# Patient Record
Sex: FEMALE | Race: WHITE | NOT HISPANIC OR LATINO | Employment: OTHER | URBAN - METROPOLITAN AREA
[De-identification: names, ages, dates, MRNs, and addresses within clinical notes are randomized per-mention and may not be internally consistent; named-entity substitution may affect disease eponyms.]

---

## 2017-01-12 LAB
CHOLESTEROL (EXTERNAL): 186 MG/DL (ref 100–199)
GLUCOSE (EXTERNAL): 91 MG/DL (ref 65–100)
HDLC SERPL-MCNC: 56 MG/DL
LDL CHOLESTEROL CALCULATED (EXTERNAL): 117 MG/DL
NON HDL CHOLESTEROL (EXTERNAL): 130 MG/DL
TRIGLYCERIDES (EXTERNAL): 64 MG/DL

## 2017-03-16 ENCOUNTER — HOSPITAL ENCOUNTER (INPATIENT)
Facility: CLINIC | Age: 57
LOS: 2 days | Discharge: HOME | DRG: 387 | End: 2017-03-19
Attending: EMERGENCY MEDICINE | Admitting: HOSPITALIST
Payer: COMMERCIAL

## 2017-03-16 DIAGNOSIS — K50.90: Primary | ICD-10-CM

## 2017-03-16 DIAGNOSIS — K52.9 COLITIS: ICD-10-CM

## 2017-03-16 LAB
ALBUMIN SERPL-MCNC: 4.4 G/DL (ref 3.4–5)
ALP SERPL-CCNC: 104 U/L (ref 40–150)
ALT SERPL W P-5'-P-CCNC: 22 U/L (ref 0–50)
ANION GAP SERPL CALCULATED.3IONS-SCNC: 11 MMOL/L (ref 3–14)
AST SERPL W P-5'-P-CCNC: 28 U/L (ref 0–45)
BASOPHILS # BLD AUTO: 0 10E9/L (ref 0–0.2)
BASOPHILS NFR BLD AUTO: 0.2 %
BILIRUB SERPL-MCNC: 0.5 MG/DL (ref 0.2–1.3)
BUN SERPL-MCNC: 13 MG/DL (ref 7–30)
CALCIUM SERPL-MCNC: 9.6 MG/DL (ref 8.5–10.1)
CHLORIDE SERPL-SCNC: 102 MMOL/L (ref 94–109)
CO2 SERPL-SCNC: 27 MMOL/L (ref 20–32)
CREAT SERPL-MCNC: 0.67 MG/DL (ref 0.52–1.04)
DIFFERENTIAL METHOD BLD: ABNORMAL
EOSINOPHIL # BLD AUTO: 0 10E9/L (ref 0–0.7)
EOSINOPHIL NFR BLD AUTO: 0.2 %
ERYTHROCYTE [DISTWIDTH] IN BLOOD BY AUTOMATED COUNT: 12.1 % (ref 10–15)
GFR SERPL CREATININE-BSD FRML MDRD: ABNORMAL ML/MIN/1.7M2
GLUCOSE SERPL-MCNC: 133 MG/DL (ref 70–99)
HCT VFR BLD AUTO: 40 % (ref 35–47)
HGB BLD-MCNC: 13.4 G/DL (ref 11.7–15.7)
IMM GRANULOCYTES # BLD: 0 10E9/L (ref 0–0.4)
IMM GRANULOCYTES NFR BLD: 0.2 %
LYMPHOCYTES # BLD AUTO: 1.1 10E9/L (ref 0.8–5.3)
LYMPHOCYTES NFR BLD AUTO: 8.9 %
MCH RBC QN AUTO: 31.2 PG (ref 26.5–33)
MCHC RBC AUTO-ENTMCNC: 33.5 G/DL (ref 31.5–36.5)
MCV RBC AUTO: 93 FL (ref 78–100)
MONOCYTES # BLD AUTO: 0.5 10E9/L (ref 0–1.3)
MONOCYTES NFR BLD AUTO: 4 %
NEUTROPHILS # BLD AUTO: 11.1 10E9/L (ref 1.6–8.3)
NEUTROPHILS NFR BLD AUTO: 86.5 %
NRBC # BLD AUTO: 0 10*3/UL
NRBC BLD AUTO-RTO: 0 /100
PLATELET # BLD AUTO: 257 10E9/L (ref 150–450)
POTASSIUM SERPL-SCNC: 3.7 MMOL/L (ref 3.4–5.3)
PROT SERPL-MCNC: 7.6 G/DL (ref 6.8–8.8)
RBC # BLD AUTO: 4.3 10E12/L (ref 3.8–5.2)
SODIUM SERPL-SCNC: 140 MMOL/L (ref 133–144)
WBC # BLD AUTO: 12.9 10E9/L (ref 4–11)

## 2017-03-16 PROCEDURE — 96361 HYDRATE IV INFUSION ADD-ON: CPT

## 2017-03-16 PROCEDURE — 25000125 ZZHC RX 250: Performed by: EMERGENCY MEDICINE

## 2017-03-16 PROCEDURE — 83690 ASSAY OF LIPASE: CPT | Performed by: EMERGENCY MEDICINE

## 2017-03-16 PROCEDURE — 85025 COMPLETE CBC W/AUTO DIFF WBC: CPT | Performed by: EMERGENCY MEDICINE

## 2017-03-16 PROCEDURE — 96374 THER/PROPH/DIAG INJ IV PUSH: CPT

## 2017-03-16 PROCEDURE — 80053 COMPREHEN METABOLIC PANEL: CPT | Performed by: EMERGENCY MEDICINE

## 2017-03-16 PROCEDURE — 99285 EMERGENCY DEPT VISIT HI MDM: CPT | Mod: 25

## 2017-03-16 PROCEDURE — 96375 TX/PRO/DX INJ NEW DRUG ADDON: CPT

## 2017-03-16 PROCEDURE — 25000128 H RX IP 250 OP 636: Performed by: EMERGENCY MEDICINE

## 2017-03-16 PROCEDURE — 84703 CHORIONIC GONADOTROPIN ASSAY: CPT | Performed by: EMERGENCY MEDICINE

## 2017-03-16 PROCEDURE — 96376 TX/PRO/DX INJ SAME DRUG ADON: CPT

## 2017-03-16 RX ORDER — FENTANYL CITRATE 50 UG/ML
25 INJECTION, SOLUTION INTRAMUSCULAR; INTRAVENOUS ONCE
Status: COMPLETED | OUTPATIENT
Start: 2017-03-16 | End: 2017-03-16

## 2017-03-16 RX ORDER — ONDANSETRON 2 MG/ML
4 INJECTION INTRAMUSCULAR; INTRAVENOUS ONCE
Status: COMPLETED | OUTPATIENT
Start: 2017-03-16 | End: 2017-03-16

## 2017-03-16 RX ORDER — METOCLOPRAMIDE HYDROCHLORIDE 5 MG/ML
10 INJECTION INTRAMUSCULAR; INTRAVENOUS ONCE
Status: COMPLETED | OUTPATIENT
Start: 2017-03-16 | End: 2017-03-16

## 2017-03-16 RX ORDER — FENTANYL CITRATE 50 UG/ML
INJECTION, SOLUTION INTRAMUSCULAR; INTRAVENOUS
Status: DISCONTINUED
Start: 2017-03-16 | End: 2017-03-17 | Stop reason: HOSPADM

## 2017-03-16 RX ADMIN — FENTANYL CITRATE 25 MCG: 50 INJECTION, SOLUTION INTRAMUSCULAR; INTRAVENOUS at 23:54

## 2017-03-16 RX ADMIN — METOCLOPRAMIDE 10 MG: 5 INJECTION, SOLUTION INTRAMUSCULAR; INTRAVENOUS at 23:56

## 2017-03-16 RX ADMIN — ONDANSETRON HYDROCHLORIDE 4 MG: 2 SOLUTION INTRAMUSCULAR; INTRAVENOUS at 23:34

## 2017-03-16 RX ADMIN — SODIUM CHLORIDE 1000 ML: 9 INJECTION, SOLUTION INTRAVENOUS at 23:31

## 2017-03-16 ASSESSMENT — ENCOUNTER SYMPTOMS
VOMITING: 1
ABDOMINAL PAIN: 1
NAUSEA: 1
DIARRHEA: 1

## 2017-03-16 NOTE — IP AVS SNAPSHOT
MRN:[id number]                      After Visit Summary   3/16/2017    Carina Chaney    MRN: [id number]           Thank you!     Thank you for choosing [facility] for your care. Our goal is always to provide you with excellent care. Hearing back from our patients is one way we can continue to improve our services. Please take a few minutes to complete the written survey that you may receive in the mail after you visit with us. Thank you!        Patient Information     Date Of Birth          1960        About your hospital stay     You were admitted on:  March 17, 2017 You last received care in the:  [place] [address] [facility]    You were discharged on:  March 19, 2017       Who to Call     For medical emergencies, please call 911.  For non-urgent questions about your medical care, please call your primary care provider or clinic, None          Attending Provider     Provider Specialty    Samantha Leigh MD Emergency Medicine    [facility], Roc YI MD Internal Medicine       Primary Care Provider Fax #    Kayla Gonzalez [phone]       No address on file        After Care Instructions     Activity       Your activity upon discharge: activity as tolerated            Diet       Follow this diet upon discharge: Orders Placed This Encounter      Regular Diet Adult                  Follow-up Appointments     Follow-up and recommended labs and tests        Follow up with primary care provider, Kayla Gonzalez, within 7 days for hospital follow- up.  No follow up labs or test are needed.  Follow up with GI in 1 week.                  Pending Results     No orders found from 3/14/2017 to 3/17/2017.            Statement of Approval     Ordered          03/19/17 1225  I have reviewed and agree with all the recommendations and orders detailed in this document.  EFFECTIVE NOW     Approved and electronically signed by:  Walt Underwood MD             Admission Information     Date &  "Time Provider Department Dept. Phone    3/16/2017 Roc Esrtada MD [place] [address] [facility] [phone]      Your Vitals Were     Blood Pressure Pulse Temperature Respirations Height Weight    137/82 (BP Location: Right arm) 55 98.1  F (36.7  C) (Oral) 16 1.676 m (5' 6\") 63.5 kg (140 lb)    Pulse Oximetry BMI (Body Mass Index)                96% 22.6 kg/m2          "[employer] Information     [employer] lets you send messages to your doctor, view your test results, renew your prescriptions, schedule appointments and more. To sign up, go to [email or web address] . Click on \"Log in\" on the left side of the screen, which will take you to the Welcome page. Then click on \"Sign up Now\" on the right side of the page.     You will be asked to enter the access code listed below, as well as some personal information. Please follow the directions to create your username and password.     Your access code is: [email or web address]  Expires: 2017  1:07 PM     Your access code will  in 90 days. If you need help or a new code, please call your [facility] clinic or [phone].        Care EveryWhere ID     This is your Care EveryWhere ID. This could be used by other organizations to access your [facility] medical records  [id number]           Review of your medicines      START taking        Dose / Directions    budesonide 3 MG EC capsule   Commonly known as:  ENTOCORT EC        Dose:  9 mg   Take 3 capsules (9 mg) by mouth every morning   Quantity:  90 capsule   Refills:  0         CONTINUE these medicines which have NOT CHANGED        Dose / Directions    diltiazem 2% in PLO cream (FV COMPOUNDED) 2% Gel        Apply topically daily as needed   Refills:  0       hyoscyamine 0.125 MG sublingual tablet   Commonly known as:  LEVSIN/SL        Dose:  0.125 mg   Place 0.125 mg under the tongue every 4 hours as needed for cramping   Refills:  0       PEPPERMINT OIL PO        Dose:  2 capsule   Take 2 capsules by mouth " 2 times daily (before meals)   Refills:  0       PROBIOTIC PO        Dose:  1 capsule   Take 1 capsule by mouth daily   Refills:  0       REGLAN PO        Dose:  10 mg   Take 10 mg by mouth every 6 hours as needed   Refills:  0       ZOFRAN PO        Dose:  4 mg   Take 4 mg by mouth every 4 hours as needed for nausea or vomiting   Refills:  0            Where to get your medicines      These medications were sent to [facility] [place], [place] - [address]  [address], [place] [address]     Phone:  [phone]     budesonide 3 MG EC capsule                Protect others around you: Learn how to safely use, store and throw away your medicines at www.disposemymeds.org.             Medication List: This is a list of all your medications and when to take them. Check marks below indicate your daily home schedule. Keep this list as a reference.      Medications           Morning Afternoon Evening Bedtime As Needed    budesonide 3 MG EC capsule   Commonly known as:  ENTOCORT EC   Take 3 capsules (9 mg) by mouth every morning   Next Dose Due:  Resume home schedule.                                   diltiazem 2% in PLO cream (FV COMPOUNDED) 2% Gel   Apply topically daily as needed   Next Dose Due:  Resume home schedule.                                   hyoscyamine 0.125 MG sublingual tablet   Commonly known as:  LEVSIN/SL   Place 0.125 mg under the tongue every 4 hours as needed for cramping   Next Dose Due:  resume home schedule.                                   PEPPERMINT OIL PO   Take 2 capsules by mouth 2 times daily (before meals)   Next Dose Due:  Resume home schedule.                                      PROBIOTIC PO   Take 1 capsule by mouth daily   Next Dose Due:  Resume home schedule.                                   REGLAN PO   Take 10 mg by mouth every 6 hours as needed   Next Dose Due:  Resume home schedule.                                   ZOFRAN PO   Take 4 mg by mouth  "every 4 hours as needed for nausea or vomiting   Next Dose Due:  Resume home schedule.                                             More Information        Discharge Instructions for Crohn s Disease  You have been diagnosed with Crohn s disease. Your digestive tract is swollen and inflamed. All layers of your digestive tract may be affected. Although there is no cure for Crohn s disease, you can receive treatment for the symptoms. Help manage your symptoms by following your doctor s advice and watching what you eat.  Home Care    Work closely with your doctor to determine the types of treatment that are best for you.    Take your medications exactly as directed:    Let your doctor know if you are having uncomfortable side effects.    Don t stop taking your medications without talking to your doctor first.    It may be helpful to avoid certain foods for a little while. Depending on your condition, these may include caffeine (coffee, tea, and cola), spicy foods, milk products, and raw fruits and vegetables. For certain people, these can be hard to digest and can worsen symptoms in a flare-up. Your doctor may have you work with a nutritionist to come up with the best food choices for you.    Try eating several small meals a day instead of 3 large ones.    Keep appointments for regular checkups even if you are not having symptoms.    Talk to your doctor about surgery for Crohn s disease. Surgery won t cure Crohn s disease, but it may help control the symptoms. Only you and your doctor can decide if this option is right for you.    Learn more about your condition. Contact the Crohn s and Colitis Foundation toll-free at [phone] (www.ccfa.org)  Managing Nutrition  You may be able to eat most foods until you have a flare-up. But like anyone else, you need to make healthy eating choices. Some of the healthiest foods can make symptoms worse, though. Keeping track of your \"problem foods\" may be helpful. Ask your doctor any " questions you have about healthy eating.  When to Call Your Doctor  Call your doctor right away if you have any of the following:    Severe pain or bloating in your abdomen after meals    Sores in your mouth    Sores in your anal area (around your rectum)    Fever above 101 F (38.3 C) or chills    Poor appetite or weight loss    Bloody diarrhea    Nausea or vomiting    Skin rashes or skin that weeps (or drains)    Changes in your vision     4665-1624 [employer]. [address] [place] [address], [place] [address]. All rights reserved. This information is not intended as a substitute for professional medical care. Always follow your healthcare professional's instructions.                Lifestyle Management of Crohn s Disease  You can lead a full life even if you have Crohn s disease. Focus on keeping your symptoms under control. Do not let this disease isolate you. By planning ahead and working with support groups, you can find ways to cope, and you may even help others who have Crohn s disease.  Crohn s disease is a type of inflammatory bowel disease (IBD).   Have a Plan  Make this your goal:  Crohn s disease won t keep me from the activities I enjoy.  You may need to do some planning to reach that goal. But by staying positive, you can help make sure you re in control -- not the disease. Here are some other tips:    Know where to find clean bathrooms.    Eat more small meals instead of 3 big meals, especially when on the road or when you don t have easy access to bathrooms.    If you ve had a recent flare-up, eat foods that you know will limit your symptoms. Keep those foods on hand, both at home and at work.    Get some exercise every day.    Take a stress reduction class.    If going on a long trip, discuss your plans with your health care provider. He or she can teach you what to do if you have a flare-up while on the road.    Find a Support Group  Crohn s disease support groups can help you with many  concerns you may have. Other people have felt much of what you may be feeling. Just knowing that you re not alone can be a great comfort. Someone in a support group may offer a travel tip or a coping skill that s perfect for you, and don t forget how satisfying it can feel to help another Crohn s disease patient who s in need.  Contact the Crohn s and Colitis Foundation toll-free at [phone] for more information.  Managing Nutrition  You may be able to eat most foods until you have a flare-up. But like anyone else, you need to make healthy eating choices. Some of the healthiest foods can make symptoms worse, though. Keeping track of your  problem foods  may be helpful. Ask your health care provider any questions you have about healthy eating.  Avoid Your Problem Foods  There s no rule for which foods can be a problem. How you feel after eating them is the best guide. You may need to avoid high-fiber foods and foods that are hard to digest. These can include fresh fruits and vegetables. High-fat foods, such as whole-milk dairy products and red meat, also can worsen symptoms in a flare-up. Write down what you eat and how it affects you. If one kind of food often gives you trouble, stay away from it. Also note the foods that work well for you. Your health care provider may have you see a registered dietitian to come up with the best food choices for you. A registered dietitian can help ensure that you eat foods that are  safe  while getting proper nourishment.  Foods That Are Often  Safe   No two people respond the same to all foods. But these choices are often  safe  to eat during a flare-up:    Tuna packed in water    Skinless chicken    White rice    Mashed potatoes    Plain pasta    Instant oatmeal    Lexus toast   Applesauce    Flavored gelatin    Vanilla pudding    Custard    Baked potatoes (don t eat the skin)    Canned peaches or pears   If You Need Special Nourishment  In rare cases, the small intestine  can t absorb nutrients. Total parenteral nutrition (TPN) is a treatment that provides nourishment through an IV (intravenous) tube. This lets you get nutrition without eating, giving your digestive tract time to rest. TPN also may be used to help prepare for surgery, if needed. TPN can be done either in the hospital or at home with the aid of a home health nurse.     4288-6350 [employer]. [address] [place] [address], [place] [address]. All rights reserved. This information is not intended as a substitute for professional medical care. Always follow your healthcare professional's instructions.                Management of Crohn s Disease: Medications  Your doctor may prescribe medication to control your Crohn s disease symptoms and improve your quality of life. Medication won t cure Crohn s disease, but it can help keep the disease from slowing you down. As always, work closely with your doctor. Your medication or dosage may need to be changed if you have certain side effects or if your symptoms change  Corticosteroids  Your doctor may advise you to take corticosteroids. These help to calm inflammation in your body. This can make your symptoms better quickly. You may take corticosteroids as a pill, or liquid by mouth. In some cases, they may be given through an IV or given rectally as either a suppository or an enema. You take them for a short time, usually not longer than 8 to 12 weeks. You do not take them when you are in remission. Remission is a long period with no symptoms.  If used for a long time, side effects may include:    Mood changes    Trouble sleeping    Changes in body shape    Puffy face or acne    Weight gain    Stretch marks    Eye problems       Bone loss or breaks    Facial hair in women    High blood pressure    Risk of diabetes  Immunomodulators  These kinds of medications cause your body s immune system to be less active. This can help to reduce inflammation and calm your symptoms.  They are taken as a pill, by mouth. You may not feel their effects until you have taken them for a few months. But you can take them for a long time. You will need to have blood tests every few months to check your liver and blood cell counts.  Side effects may include:    Nausea    Body aches    Inflammation of the pancreas    Low white blood cell count    Liver problems    Low folic acid levels    Infection    Lymphoma    Non-melanoma skin cancer  Biologic agents  These kinds of medications help to stop a chemical that your body makes, which causes inflammation. The chemical is called tumor necrosis factor (TNF). The medications are also known as anti-TNF monoclonal antibodies. The way you take a biologic agent depends on how the drug is given. It may be given through an IV every 2 to 8 weeks. It may be given through an injection (shot) as often as once a week. Or it may be given as a shot once a month. These drugs can put you at risk for infections, so tell your doctor if you have a chronic infection. Your doctor may also test you for tuberculosis and hepatitis B infection before giving you the medication.  Side effects may include:    Flushing, chest pain, shortness of breath, hives, or a drop in blood pressure during IV treatment    Joint and muscle aches    Rash    Fever    Infection (including reactivation of the tuberculosis and hepatitis B)    Lymphoma    Skin cancers    Hepatotoxity    TNF-induced psoriasis  Antibiotics  These may be used if you also have an infection due to Crohn s disease, such as an abscess. Antibiotics may be given as a pill taken by mouth. You should stay out of the sun while taking them. You should also not drink alcohol while taking them. It may cause severe reactions, such as nausea, vomiting, and breathing problems. Also, tell your doctor right away if you have numbness or tingling in your hands. Also tell your doctor if your bowel symptoms become worse.  Side effects may  include:    Nausea    Diarrhea    Headaches    Dizziness    Dark urine    Loss of appetite    Metallic taste in the mouth    Sensitivity to the sun  Handling side effects  You and your doctor will discuss side effects. In most cases, side effects are easy to manage. But sometimes they can become severe enough that you need to change medication. Call your doctor if you re having side effects that trouble you. Also call if you re having any side effects that are unexpected.    1215-0345 [employer]. [address] [place] [address], [place] [address]. All rights reserved. This information is not intended as a substitute for professional medical care. Always follow your healthcare professional's instructions.                What Is Crohn s Disease?     Any part of the digestive tract, from the mouth to the anus, can be affected by Crohn's disease. It is mostly found where the small intestine and colon meet.   Crohn s disease causes swelling, inflammation, and irritation, which leads to ulceration of the digestive tract. It is a form of inflammatory bowel disease (IBD) and often affects the small intestine and the colon. All layers of the lining of the digestive tract may be affected. While this disease has no cure, the symptoms can be treated. Help manage your symptoms by following your doctor s advice and avoiding foods that cause irritation.  Symptoms of Crohn s disease    Abdominal pain and cramping    Urgent need to move bowels or sensation of incomplete evacuation    High fever and chills    Loss of appetite; possible weight loss    Bloody or persistent diarrhea    Nausea or vomiting    Joint pain    Skin rashes and ulceration    Eye inflammation  Your treatment options  Your doctor will discuss your treatment options with you. They may include medications, lifestyle changes, surgery, or a combination of these. Treatment helps you stay as active as you want to be. Keep in mind that Crohn s is considered  chronic. That means it usually can t be cured. But treatment may ease symptoms. And even though you have a chronic illness, you can still live a full life.     Crohn s disease can affect all the layers of the digestive tract.   Medications  Certain medications can help your symptoms. These may include:    Medications to control your body's immune system, such as 6-mercaptopurine or azathioprine    Corticosteroids (for short-term, but not maintenance treatment) to help reduce inflammation    Antibiotics to fight bacteria, if there are infectious complications    Biologics (anti-TNF)  Lifestyle changes    Certain foods can worsen symptoms. You may need to change what you eat. Avoid any food that makes your symptoms worse. These foods vary from person to person. But high-fiber foods (such as fresh vegetables) and high-fat foods (such as dairy products and red meat) cause symptoms in many people. Keep track of foods that cause you problems.    To a lesser degree, stress may possibly worsen symptoms. Reducing stress may help. Techniques like relaxation exercises, meditation, and deep breathing can help you control stress. Your health care provider may be able to tell you more about these.  If surgery is needed  Surgery may help control Crohn s, relieving digestive tract symptoms. Surgery can remove a severely affected part of the digestive tract. If this is an option for you, your doctor can give you more information. Keep in mind that surgery is not a cure for Crohn's. You will need to continue to closely follow up with your doctor after surgery for further treatment and testing recommendations.    0133-2712 [employer]. [address] [place] [address], [place] [address]. All rights reserved. This information is not intended as a substitute for professional medical care. Always follow your healthcare professional's instructions.

## 2017-03-16 NOTE — IP AVS SNAPSHOT
[address] [facility]    [address] [place] [address]    Phone:  [phone]                                       After Visit Summary   3/16/2017    Carina Chaney    MRN: [id number]           After Visit Summary Signature Page     I have received my discharge instructions, and my questions have been answered. I have discussed any challenges I see with this plan with the nurse or doctor.    ..........................................................................................................................................  Patient/Patient Representative Signature      ..........................................................................................................................................  Patient Representative Print Name and Relationship to Patient    ..................................................               ................................................  Date                                            Time    ..........................................................................................................................................  Reviewed by Signature/Title    ...................................................              ..............................................  Date                                                            Time

## 2017-03-17 ENCOUNTER — APPOINTMENT (OUTPATIENT)
Dept: CT IMAGING | Facility: CLINIC | Age: 57
DRG: 387 | End: 2017-03-17
Attending: EMERGENCY MEDICINE
Payer: COMMERCIAL

## 2017-03-17 PROBLEM — K50.90 ACUTE CROHN'S DISEASE (H): Status: ACTIVE | Noted: 2017-03-17

## 2017-03-17 LAB
ALBUMIN UR-MCNC: 10 MG/DL
APPEARANCE UR: CLEAR
BILIRUB UR QL STRIP: NEGATIVE
COLOR UR AUTO: YELLOW
GLUCOSE UR STRIP-MCNC: NEGATIVE MG/DL
HCG SERPL QL: NEGATIVE
HGB UR QL STRIP: ABNORMAL
KETONES UR STRIP-MCNC: >150 MG/DL
LACTATE BLD-SCNC: 1 MMOL/L (ref 0.7–2.1)
LEUKOCYTE ESTERASE UR QL STRIP: ABNORMAL
LIPASE SERPL-CCNC: 375 U/L (ref 73–393)
MUCOUS THREADS #/AREA URNS LPF: PRESENT /LPF
NITRATE UR QL: NEGATIVE
PH UR STRIP: 6 PH (ref 5–7)
RBC #/AREA URNS AUTO: 8 /HPF (ref 0–2)
SP GR UR STRIP: 1.04 (ref 1–1.03)
SQUAMOUS #/AREA URNS AUTO: 1 /HPF (ref 0–1)
TRANS CELLS #/AREA URNS HPF: <1 /HPF (ref 0–1)
URN SPEC COLLECT METH UR: ABNORMAL
UROBILINOGEN UR STRIP-MCNC: NORMAL MG/DL (ref 0–2)
WBC #/AREA URNS AUTO: 3 /HPF (ref 0–2)

## 2017-03-17 PROCEDURE — 74177 CT ABD & PELVIS W/CONTRAST: CPT

## 2017-03-17 PROCEDURE — 99223 1ST HOSP IP/OBS HIGH 75: CPT | Mod: AI | Performed by: HOSPITALIST

## 2017-03-17 PROCEDURE — 12000007 ZZH R&B INTERMEDIATE

## 2017-03-17 PROCEDURE — 25000125 ZZHC RX 250: Performed by: EMERGENCY MEDICINE

## 2017-03-17 PROCEDURE — 25000125 ZZHC RX 250: Performed by: HOSPITALIST

## 2017-03-17 PROCEDURE — 83605 ASSAY OF LACTIC ACID: CPT | Performed by: EMERGENCY MEDICINE

## 2017-03-17 PROCEDURE — 81001 URINALYSIS AUTO W/SCOPE: CPT | Performed by: HOSPITALIST

## 2017-03-17 PROCEDURE — 25000128 H RX IP 250 OP 636: Performed by: EMERGENCY MEDICINE

## 2017-03-17 PROCEDURE — 25500064 ZZH RX 255 OP 636: Performed by: EMERGENCY MEDICINE

## 2017-03-17 PROCEDURE — 99207 ZZC NO CHARGE VISIT/PATIENT NOT SEEN: CPT | Performed by: INTERNAL MEDICINE

## 2017-03-17 PROCEDURE — S0028 INJECTION, FAMOTIDINE, 20 MG: HCPCS | Performed by: HOSPITALIST

## 2017-03-17 PROCEDURE — 25000128 H RX IP 250 OP 636: Performed by: HOSPITALIST

## 2017-03-17 RX ORDER — FENTANYL CITRATE 50 UG/ML
25 INJECTION, SOLUTION INTRAMUSCULAR; INTRAVENOUS ONCE
Status: DISCONTINUED | OUTPATIENT
Start: 2017-03-17 | End: 2017-03-17

## 2017-03-17 RX ORDER — POTASSIUM CHLORIDE 29.8 MG/ML
20 INJECTION INTRAVENOUS
Status: DISCONTINUED | OUTPATIENT
Start: 2017-03-17 | End: 2017-03-19 | Stop reason: HOSPADM

## 2017-03-17 RX ORDER — FENTANYL CITRATE 50 UG/ML
25 INJECTION, SOLUTION INTRAMUSCULAR; INTRAVENOUS ONCE
Status: COMPLETED | OUTPATIENT
Start: 2017-03-17 | End: 2017-03-17

## 2017-03-17 RX ORDER — NALOXONE HYDROCHLORIDE 0.4 MG/ML
.1-.4 INJECTION, SOLUTION INTRAMUSCULAR; INTRAVENOUS; SUBCUTANEOUS
Status: DISCONTINUED | OUTPATIENT
Start: 2017-03-17 | End: 2017-03-19 | Stop reason: HOSPADM

## 2017-03-17 RX ORDER — POTASSIUM CHLORIDE 7.45 MG/ML
10 INJECTION INTRAVENOUS
Status: DISCONTINUED | OUTPATIENT
Start: 2017-03-17 | End: 2017-03-19 | Stop reason: HOSPADM

## 2017-03-17 RX ORDER — POTASSIUM CHLORIDE 1.5 G/1.58G
20-40 POWDER, FOR SOLUTION ORAL
Status: DISCONTINUED | OUTPATIENT
Start: 2017-03-17 | End: 2017-03-19 | Stop reason: HOSPADM

## 2017-03-17 RX ORDER — ACETAMINOPHEN 325 MG/1
650 TABLET ORAL EVERY 4 HOURS PRN
Status: DISCONTINUED | OUTPATIENT
Start: 2017-03-17 | End: 2017-03-19 | Stop reason: HOSPADM

## 2017-03-17 RX ORDER — ONDANSETRON 4 MG/1
4 TABLET, ORALLY DISINTEGRATING ORAL EVERY 6 HOURS PRN
Status: DISCONTINUED | OUTPATIENT
Start: 2017-03-17 | End: 2017-03-19 | Stop reason: HOSPADM

## 2017-03-17 RX ORDER — SODIUM CHLORIDE 9 MG/ML
INJECTION, SOLUTION INTRAVENOUS CONTINUOUS
Status: DISCONTINUED | OUTPATIENT
Start: 2017-03-17 | End: 2017-03-19

## 2017-03-17 RX ORDER — POTASSIUM CHLORIDE 1500 MG/1
20-40 TABLET, EXTENDED RELEASE ORAL
Status: DISCONTINUED | OUTPATIENT
Start: 2017-03-17 | End: 2017-03-19 | Stop reason: HOSPADM

## 2017-03-17 RX ORDER — ONDANSETRON 2 MG/ML
4 INJECTION INTRAMUSCULAR; INTRAVENOUS EVERY 6 HOURS PRN
Status: DISCONTINUED | OUTPATIENT
Start: 2017-03-17 | End: 2017-03-19 | Stop reason: HOSPADM

## 2017-03-17 RX ORDER — PROCHLORPERAZINE MALEATE 5 MG
5-10 TABLET ORAL EVERY 6 HOURS PRN
Status: DISCONTINUED | OUTPATIENT
Start: 2017-03-17 | End: 2017-03-19 | Stop reason: HOSPADM

## 2017-03-17 RX ORDER — HYDROMORPHONE HYDROCHLORIDE 1 MG/ML
0.2 INJECTION, SOLUTION INTRAMUSCULAR; INTRAVENOUS; SUBCUTANEOUS ONCE
Status: COMPLETED | OUTPATIENT
Start: 2017-03-17 | End: 2017-03-17

## 2017-03-17 RX ORDER — HYDROMORPHONE HYDROCHLORIDE 1 MG/ML
.3-.5 INJECTION, SOLUTION INTRAMUSCULAR; INTRAVENOUS; SUBCUTANEOUS
Status: DISCONTINUED | OUTPATIENT
Start: 2017-03-17 | End: 2017-03-19 | Stop reason: HOSPADM

## 2017-03-17 RX ORDER — PROCHLORPERAZINE 25 MG
25 SUPPOSITORY, RECTAL RECTAL EVERY 12 HOURS PRN
Status: DISCONTINUED | OUTPATIENT
Start: 2017-03-17 | End: 2017-03-19 | Stop reason: HOSPADM

## 2017-03-17 RX ORDER — IOPAMIDOL 755 MG/ML
71 INJECTION, SOLUTION INTRAVASCULAR ONCE
Status: COMPLETED | OUTPATIENT
Start: 2017-03-17 | End: 2017-03-17

## 2017-03-17 RX ADMIN — SODIUM CHLORIDE: 9 INJECTION, SOLUTION INTRAVENOUS at 04:08

## 2017-03-17 RX ADMIN — HYDROMORPHONE HYDROCHLORIDE 0.5 MG: 1 INJECTION, SOLUTION INTRAMUSCULAR; INTRAVENOUS; SUBCUTANEOUS at 08:34

## 2017-03-17 RX ADMIN — FENTANYL CITRATE 25 MCG: 50 INJECTION, SOLUTION INTRAMUSCULAR; INTRAVENOUS at 02:51

## 2017-03-17 RX ADMIN — HYDROMORPHONE HYDROCHLORIDE 0.5 MG: 1 INJECTION, SOLUTION INTRAMUSCULAR; INTRAVENOUS; SUBCUTANEOUS at 04:20

## 2017-03-17 RX ADMIN — HYDROMORPHONE HYDROCHLORIDE 0.2 MG: 1 INJECTION, SOLUTION INTRAMUSCULAR; INTRAVENOUS; SUBCUTANEOUS at 03:18

## 2017-03-17 RX ADMIN — SODIUM CHLORIDE 1000 ML: 9 INJECTION, SOLUTION INTRAVENOUS at 08:36

## 2017-03-17 RX ADMIN — IOPAMIDOL 71 ML: 755 INJECTION, SOLUTION INTRAVENOUS at 00:50

## 2017-03-17 RX ADMIN — SODIUM CHLORIDE: 9 INJECTION, SOLUTION INTRAVENOUS at 15:06

## 2017-03-17 RX ADMIN — FENTANYL CITRATE 25 MCG: 50 INJECTION, SOLUTION INTRAMUSCULAR; INTRAVENOUS at 01:55

## 2017-03-17 RX ADMIN — ONDANSETRON HYDROCHLORIDE 4 MG: 2 SOLUTION INTRAMUSCULAR; INTRAVENOUS at 08:34

## 2017-03-17 RX ADMIN — FAMOTIDINE 20 MG: 10 INJECTION, SOLUTION INTRAVENOUS at 22:27

## 2017-03-17 RX ADMIN — FENTANYL CITRATE 25 MCG: 50 INJECTION, SOLUTION INTRAMUSCULAR; INTRAVENOUS at 00:24

## 2017-03-17 RX ADMIN — PROCHLORPERAZINE EDISYLATE 10 MG: 5 INJECTION INTRAMUSCULAR; INTRAVENOUS at 04:14

## 2017-03-17 RX ADMIN — FAMOTIDINE 20 MG: 10 INJECTION, SOLUTION INTRAVENOUS at 08:17

## 2017-03-17 RX ADMIN — HYDROMORPHONE HYDROCHLORIDE 0.5 MG: 1 INJECTION, SOLUTION INTRAMUSCULAR; INTRAVENOUS; SUBCUTANEOUS at 19:07

## 2017-03-17 RX ADMIN — HYDROMORPHONE HYDROCHLORIDE 0.5 MG: 1 INJECTION, SOLUTION INTRAMUSCULAR; INTRAVENOUS; SUBCUTANEOUS at 11:15

## 2017-03-17 RX ADMIN — SODIUM CHLORIDE 61 ML: 9 INJECTION, SOLUTION INTRAVENOUS at 00:50

## 2017-03-17 ASSESSMENT — ACTIVITIES OF DAILY LIVING (ADL)
BATHING: 0-->INDEPENDENT
RETIRED_EATING: 0-->INDEPENDENT
AMBULATION: 0-->INDEPENDENT
FALL_HISTORY_WITHIN_LAST_SIX_MONTHS: NO
SWALLOWING: 0-->SWALLOWS FOODS/LIQUIDS WITHOUT DIFFICULTY
RETIRED_COMMUNICATION: 0-->UNDERSTANDS/COMMUNICATES WITHOUT DIFFICULTY
TOILETING: 0-->INDEPENDENT
TRANSFERRING: 0-->INDEPENDENT
DRESS: 0-->INDEPENDENT
COGNITION: 0 - NO COGNITION ISSUES REPORTED

## 2017-03-17 NOTE — PROGRESS NOTES
Pt seen and examined. Seen by Dr Estrada earlier in the morning.   H/P, labs, vital signs and orders reviewed. Agree with his A/P.  Patient presenting with abdominal pain and ?bloody diarrhea  Much better at the moment  Seen by GI  Diet-ADAT  Await stool studies  No BM since 1:00 AM    O/E  Temp: 98.8  F (37.1  C) Temp src: Oral BP: 131/67 Pulse: 66 Heart Rate: 57 Resp: 16 SpO2: 99 % O2 Device: None (Room air)     Comfortable  Abd- soft, no tenderness at the moment    Plan:  Continue supportive Rx  Reassess in AM

## 2017-03-17 NOTE — CONSULTS
GASTROENTEROLOGY CONSULTATION     Carina Chaney   [address]  [place] [address]   56 year old female   Admission Date/Time: 3/16/2017   Encounter Date: 3/17/2017  Primary Care Provider: Kayla Gonzalez     Referring / Attending Physician: Roc Estrada   We were asked to see the patient in consultation by Dr. Estrada for evaluation of Crohn's disease.     HPI: Carina Chaney is a 56 year old female with a past medical history significant for colonic and rectal Crohn's disease not currently on any medication, irritable bowel syndrome and history of basilic and cephalic vein thrombosis who presented to the ED yesterday with abdominal pain, vomiting and diarrhea. The patient states that she thinks that she got food poisoning from eating pizza from [employer] yesterday. The patient states that this can happen with her Crohn's where she will eat something that does not agree with her body and causes severe abdominal pain, diarrhea and vomiting. Typically the symptoms resolve on their own over the course of a day or two. Her last episode was about 3-4 weeks ago.   Her symptoms began abruptly yesterday around 650 PM. The pain was located on her right lower abdomen and radiated throughout her lower abdomen. This was followed by nausea and vomiting. She states that she had about 10-12 episodes of diarrhea and that some of the last BMs appeared to have bright red blood in them.   In the ED she was found to have mild leukocytosis and a normal lactic acid and CMP. CT of the abdomen and pelvis was done which showed mild wall thickening of the splenic flexure through the distal descending colon. She received a number of doses of Fentanyl and Dilaudid without great control of her symptoms, though it is noted that she appeared rather comfortable and calm in between episodes of her writhing in pain. She was admitted for further evaluation.  This morning the patient states that she has completely improved. She  denies any pain, nausea, vomiting. She has been tolerating sips of clear liquids.     Past Medical History  Past Medical History   Diagnosis Date     Colitis      Crohn disease (H)        Past Surgical History  Past Surgical History   Procedure Laterality Date     Ent surgery       Gyn surgery         Family History  IBS in her brother. Alzheimer's in mother. Father with hypertension    Social History  Social History     Social History     Marital status: Single     Spouse name: N/A     Number of children: N/A     Years of education: N/A     Occupational History     Not on file.     Social History Main Topics     Smoking status: Never Smoker     Smokeless tobacco: Not on file     Alcohol use Yes     Drug use: Not on file     Sexual activity: Not on file     Other Topics Concern     Not on file     Social History Narrative     No narrative on file       Medications  Prior to Admission medications    Medication Sig Start Date End Date Taking? Authorizing Provider   hyoscyamine (LEVSIN/SL) 0.125 MG sublingual tablet Place 0.125 mg under the tongue every 4 hours as needed for cramping   Yes Unknown, Entered By History   Probiotic Product (PROBIOTIC PO) Take 1 capsule by mouth daily   Yes Unknown, Entered By History   Ondansetron HCl (ZOFRAN PO) Take 4 mg by mouth every 4 hours as needed for nausea or vomiting   Yes Unknown, Entered By History   Metoclopramide HCl (REGLAN PO) Take 10 mg by mouth every 6 hours as needed   Yes Unknown, Entered By History   PEPPERMINT OIL PO Take 2 capsules by mouth 2 times daily (before meals)   Yes Unknown, Entered By History   diltiazem 2% in PLO cream, FV COMPOUNDED, 2% GEL Apply topically daily as needed    Yes Unknown, Entered By History       Allergies:  Codeine and Morphine    ROS: A ten point review of systems was obtained and negative other than the symptoms noted above in the HPI.     Physical Exam:   /67 (BP Location: Right arm)  Pulse 66  Temp 98.8  F (37.1  C) (Oral)   "Resp 16  Ht 1.676 m (5' 6\")  Wt 63.5 kg (140 lb)  SpO2 99%  BMI 22.6 kg/m2   Constitutional: age appropriate, alert, no acute distress  Psychiatric: normal pleasant affect  Head: atraumatic, normocephalic  Neck: supple, no thyromegaly  ENT: mucous membranes are moist, no oral lesions are noted  Abdomen: soft, non-tender, non-distended, normally active bowel sound. No masses or hepatosplenomegaly is appreciated. No rebound tenderness or guarding  Neuro: Neurologically intact grossly  Skin: warm, dry, no rashes are noted    ADDITIONAL COMMENTS:   I reviewed the patient's new clinical lab test results.   Recent Labs   Lab Test  03/16/17   2331   WBC  12.9*   HGB  13.4   MCV  93   PLT  257      Recent Labs   Lab Test  03/16/17   2331   NA  140   POTASSIUM  3.7   CHLORIDE  102   CO2  27   BUN  13   CR  0.67   ANIONGAP  11   STACEY  9.6      Recent Labs   Lab Test  03/17/17   0830  03/16/17   2331   ALBUMIN   --   4.4   BILITOTAL   --   0.5   ALT   --   22   AST   --   28   ALKPHOS   --   104   PROTEIN  10*   --    LIPASE   --   375      I reviewed the patient's new imaging results.     Assessment:  56 year old female with a history of mild colonic Crohn's maintained on no medications who presents with acute nausea, vomiting, abdominal pain and diarrhea. Her symptoms have completely resolved this morning. There was no evidence of obstruction on CT however there was greater bowel wall thickening seen than has usually been associated with her Crohn's. This could be infectious. I doubt ischemic colitis given the long segment and location.     Plan:   -Advance diet as tolerated  -Hold on IV steroids  -Collect stool for culture  -Follow up with Dr Odell to discuss repeat colonoscopy to see if Crohn's has progressed  -Ok to continue Levsin, Probiotic and IBGard for abdominal pain.    I discussed the patient's findings and plan with Dr. SHELBI Steve who will also independently see and examine the patient.     Ludwig Craig, " ALICE  [facility]  Cell:  [phone] Monday through Friday 8597-2380  Office: [phone]      GI Staff:  Patient seen and examined.  Odd that pain would resolve so rapidly.  Bleeding, CT and leukocytosis suggestive of ischemic colitis despite history of crohn's.  Consider flex sig / colonoscopy in near future.  Will make npo after midnight for possible exam tomorrow.   Otherwise doing well.  Expect discharge soon.  Abdiel Steve MD  [phone]  After 5 pm or on weekends  [phone]

## 2017-03-17 NOTE — ED NOTES
"[facility]  ED Nurse Handoff Report    ED Chief complaint: Abdominal Pain (pain hx chrones, vomiting and diarrhea 45 min after eating)      ED Diagnosis:   Final diagnoses:   Colitis       Code Status: Full Code    Allergies:   Allergies   Allergen Reactions     Codeine      Morphine        Activity level:  Independent     Needed?: No    Isolation: Yes, enteric for diarrhea  Infection: Not Applicable    Bariatric?: No      Vital Signs:   Vitals:    03/16/17 2345 03/17/17 0000 03/17/17 0015 03/17/17 0030   BP:  131/75  147/89   Pulse:       Resp:       Temp:       TempSrc:       SpO2: 99% 100% 99% 99%   Weight:       Height:           Cardiac Rhythm: ,        Pain level: 0-10 Pain Scale: 9    Is this patient confused?: No    Patient Report: Initial Complaint: Patient brought in by ambulance for abdominal cramping, nausea, vomiting and diarrhea approx. 45 mins after eating dinner.  History of crohn's.  Focused Assessment: Acute abdominal cramping with nausea on arrival.  After zofran and reglan, patient no longer nauseous.  The patient had one BM in the ED where she reported \"streaks of BRB\".  When awake, the patient requires medication for her abdominal pain.  Tests Performed: blood work, CT scan  Abnormal Results: CT shows: Mild wall thickening of the splenic flexure of colon through the  distal descending colon. This is suggestive of colitis that could be infectious, inflammatory or ischemic in etiology.  Treatments provided: IV fluid bolus, Fentanyl 25mxg x4 IV, reglan     Family Comments: none present    OBS brochure/video discussed/provided to patient: No    ED Medications:   Medications   0.9% sodium chloride BOLUS (0 mLs Intravenous Stopped 3/17/17 0153)   ondansetron (ZOFRAN) injection 4 mg (4 mg Intravenous Given 3/16/17 9544)   metoclopramide (REGLAN) injection 10 mg (10 mg Intravenous Given 3/16/17 7266)   fentaNYL Citrate (PF) (SUBLIMAZE) injection 25 mcg (25 mcg Intravenous " Given 3/16/17 1084)   iopamidol (ISOVUE-370) solution 71 mL (71 mLs Intravenous Given 3/17/17 0050)   sodium chloride 0.9 % for CT scan flush dose 61 mL (61 mLs Intravenous Given 3/17/17 0050)   fentaNYL Citrate (PF) (SUBLIMAZE) injection 25 mcg (25 mcg Intravenous Given 3/17/17 0024)   fentaNYL Citrate (PF) (SUBLIMAZE) injection 25 mcg (25 mcg Intravenous Given 3/17/17 0155)   fentaNYL Citrate (PF) (SUBLIMAZE) injection 25 mcg (25 mcg Intravenous Given 3/17/17 0251)       Drips infusing?:  No      ED NURSE PHONE NUMBER:

## 2017-03-17 NOTE — ED NOTES
"Pt hit call light and is requesting to use the restroom. Pt was walked to restroom and advised to leave a urine. Pt will return to room once she is done.    Pt returned from restroom and is now stating \"I have blood in my stool and my cramps are back\".  "

## 2017-03-17 NOTE — ED NOTES
Pt was already on ED bed and will be brought to 5th floor via ERT. Pt is noted to be sobbing and yelling in pain. MD is @ pt's side; pt was given all pain meds and recently given so no meds are available @ this time. RN is aware and pt was brought to the floor. Pt also has her belongings with her; transported to floor with the pt.

## 2017-03-17 NOTE — ED NOTES
Bed: ED12  Expected date:   Expected time:   Means of arrival:   Comments:  Katrin 443- [phone] F- N/V/D abd pain

## 2017-03-17 NOTE — PHARMACY-ADMISSION MEDICATION HISTORY
Admission medication history interview status for the 3/16/2017  admission is complete. See EPIC admission navigator for prior to admission medications     Medication history source reliability:Good    Actions taken by pharmacist (provider contacted, etc):None     Additional medication history information not noted on PTA med list :None    Medication reconciliation/reorder completed by provider prior to medication history? No    Time spent in this activity: 10 minutes    Prior to Admission medications    Medication Sig Last Dose Taking? Auth Provider   hyoscyamine (LEVSIN/SL) 0.125 MG sublingual tablet Place 0.125 mg under the tongue every 4 hours as needed for cramping prn med Yes Unknown, Entered By History   Probiotic Product (PROBIOTIC PO) Take 1 capsule by mouth daily Past Week at Unknown time Yes Unknown, Entered By History   Ondansetron HCl (ZOFRAN PO) Take 4 mg by mouth every 4 hours as needed for nausea or vomiting prn med Yes Unknown, Entered By History   Metoclopramide HCl (REGLAN PO) Take 10 mg by mouth every 6 hours as needed prn med Yes Unknown, Entered By History   PEPPERMINT OIL PO Take 2 capsules by mouth 2 times daily (before meals) Past Week at Unknown time Yes Unknown, Entered By History   diltiazem 2% in PLO cream, FV COMPOUNDED, 2% GEL Apply topically daily as needed  prn med Yes Unknown, Entered By History

## 2017-03-17 NOTE — PLAN OF CARE
Problem: Goal Outcome Summary  Goal: Goal Outcome Summary  Outcome: No Change  Pt. Arrived to floor around 0400. Pt. A&Ox4. VSS on RA. Lung sounds clear, active bowel sounds. Patient complains of severe abdominal pain and nausea. Pain controlled with IV dilaudid. Nausea controlled zofran, and compazine. IVF normal saline at 125 mL/hr. Up SBA. NPO. GI consult scheduled for today. Continue to monitor.

## 2017-03-17 NOTE — H&P
DATE OF ADMISSION:  03/16/2017       PRIMARY CARE PROVIDER:  None given.      GASTROENTEROLOGIST:  Debbie Odell MD      CHIEF COMPLAINT:  Pain in abdomen, nausea, vomiting and diarrhea.      HISTORY OF PRESENT ILLNESS:  Carina Chaney is a 56-year-old woman with history significant for Crohn's disease who presented to the Emergency Department with the above symptoms.  I discussed with the patient, also with Dr. Leigh who evaluated patient in the ER, reviewed her record in Epic for further information.      According to the patient, she started having pain in abdomen around 7:00 p.m. last night, which was located on the right lower quadrant, constant, waxing and waning pain which was severe, over 10/10 at presentation.  It was associated with nausea and vomiting and had 10-12 loose bowel movements which was runny/mucus-like and also noticed blood in the stool.  The patient denies fever, denies any different foods.  Her last flareup was in 2014 and was managed at [facility].  Her last colonoscopy was in 06/2015.  The patient was diagnosed with Crohn's in 1983.      The patient denies any urinary symptoms.  She feels her mouth is dry, has 7/10 pain currently coming in severe bouts of spasms.      In ER the patient was evaluated by Dr. Leigh.  Workup revealed mild leukocytosis, normal lactic acid and lipase and CMP.  CT abdomen and pelvis was done which showed mild wall thickening of the splenic flexure of the colon through the distal descending colon suggestive of colitis, possibly infectious, inflammatory or ischemic in etiology.  The patient received multiple doses of fentanyl 25 mg IV x4 and hydromorphone 0.2, and despite that she is having severe painful episodes but in between remains calm and nearly pain-free.  She is frequently writhing in the ER.  This was discussed with Dr. Monet from GI who recommended stool tests including C. diff, culture, O&P but recommended to hold off on any steroids or  antibiotics at this point.        REVIEW OF SYSTEMS: All 10 points systems reviewed, negative other than mentioned in HPI.    PAST MEDICAL HISTORY:   1.  Crohn's disease involving both small and large intestines, not on any maintenance medication, uses marijuana and peppermint oil, also probiotics.     2.  Rectal fissure and hemorrhoids.   3.  History of basilic and cephalic vein thrombosis in 2014 treated with rivaroxaban, continued until 05/2015.      ALLERGIES:  Codeine, ciprofloxacin, hydromorphone, sodium phosphate, morphine, all listed as allergies, but patient tolerated hydromorphone in the ER.      PRIOR TO ADMISSION MEDICATIONS:   1.  Multivitamin 1 tab by mouth daily.   2.  Levsin sublingual 0.125 mg every 4 hours as needed.   3.  Probiotic acidophilus as needed.   4.  Diltiazem 2% in white petroleum ointment topically to rectal fissure as needed.   5.  Zofran 4 mg by mouth every 8 hours as needed.   6.  Reglan 10 mg by mouth every 6 hours as needed.   7.  Peppermint oil 90 mcg 2 capsules by mouth daily.      PAST SURGICAL HISTORY:   1.  Tonsillectomy and adenoidectomy.   2.  Vaginal dilatation.   3.  Colonoscopy.   4.  Refractive surgery.   5.  Anal surgery.      FAMILY HISTORY:  Mom had Alzheimer's disease.  Father with hypertension.  Brother with irritable bowel syndrome.      SOCIAL HISTORY:  Smokes rarely, 1 cigarette in 6 months.  Uses alcohol occasionally but not when she has flareups.      PHYSICAL EXAMINATION:   GENERAL:  The patient is alert, awake, oriented.  Appears uncomfortable due to retching.  She has bouts of pain, and she is writhing in pain in one moment and after a minute or two is completely calm and comfortable.  She is tearful and appears anxious and restless during episodes of pain and retching and vomiting.   VITAL SIGNS:  Blood pressure 146/79, heart rate 66, temperature 98.1, respirations 18, pulse ox 99 on room air.   HEENT:  PERRLA, EOMI.  Oral mucosa moist.   NECK:  Supple.    LUNGS:  Bilateral equal air entry, clear to auscultation.  Remains on room air.  Has normal work of breathing.  Hyperventilating at times when she is having episodes of severe pain and spasm.   CARDIOVASCULAR:  S1, S2 regular, no tachycardia, no murmur, rub, gallop.   ABDOMEN:  Nondistended and it is soft, was tender in the right lower quadrant but no guarding, rigidity or rebound tenderness.  Bowel tones present.   MUSCULOSKELETAL:  No edema.   SKIN:  No rash.      LABORATORY DATA:  White cell count is 12.9, hemoglobin 13.4, hematocrit 40, platelet 257.  Blood sugar 133.  Sodium 140, potassium 3.7, chloride 102, bicarbonate 27, BUN 13, creatinine 0.67.  LFTs normal.  Lactic acid is 1.  Lipase 375.      IMAGING:  CT abdomen and pelvis report was reviewed by me.  It shows mild wall thickening of the splenic flexure of colon through distal descending colon suggestive of colitis, could be infectious, inflammatory or ischemic.      ASSESSMENT AND PLAN:  Carina Chaney is a 56-year-old woman with history of Crohn's disease, not on any maintenance medication currently and takes probiotics and peppermint oil, presented to the ER with about 8 hours' duration of right lower quadrant pain, nausea, vomiting, diarrhea with mucus-like stool and blood.     1.  Pain abdomen due to acute exacerbation of Crohn's disease:  The patient says her current symptoms are typical of her prior episode of Crohn's disease flareup.  She is not on any maintenance medication as above.  Follows up with Dr. Odell.  CT abdomen and pelvis shows mild wall thickening of the splenic flexure of the colon through the distal descending colon, but patient is not tender in that area.  She expresses pain in the right lower quadrant and is tender on RLQ.  Her lactic acid is normal.  She has mild leukocytosis.  Stool tests including C. diff, culture, ova and parasite are ordered.  GI has been consulted from ER, recommended to hold off on steroids at this  point.  Formal GI consult for evaluation in the morning.  Pain management with IV hydromorphone.  She likely will need more frequent doses.  If her pain worsens, would rather maintain her on PCA.  If her pain persists at the same intensity or worsens, I will review with GI again overnight regarding steroid.  NPO and aggressive IV hydration.  PPI.     2.  Prior history of deep venous thrombosis.  The patient had a cephalic and basilic vein thrombosis in , was on rivaroxaban until 2015.  Encourage ambulation at this point, and if okay with GI and if there is no significant hemoglobin drop given report of blood in the stool, would like to resume Lovenox in the morning after GI eval.  Follow electrolytes and replace as needed.     3.  Feeding and nutrition.  The patient remains n.p.o.  Swabs to make her mouth moist, IV hydration as above.  Follow electrolytes and replace as needed.      DEEP VENOUS THROMBOSIS PROPHYLAXIS:  PCDs for now.      The above plan was discussed with the patient.  Anticipate 2-3 days of hospital stay.      CODE STATUS:  Full Code by default.         ALTHEA BONE MD             D: 2017 03:50   T: 2017 04:28   MT: justice      Name:     RACHEL SALCEDO   MRN:      [id number]        Account:      [id number]   :      1960           Admitted:     [id number]      Document: [id number]

## 2017-03-17 NOTE — ED PROVIDER NOTES
"  History     Chief Complaint:    Abdominal Pain, nausea, vomiting, diarrhea     HPI   Carina Chaney is a 56 year old female with a history of crohn's disease who presents with abdominal pain, nausea and vomiting. The patient states that she ate 3 hours ago and subsequently developed constant cramping abdominal pain, nausea, vomiting, and diarrhea since. She called EMS and was given zofran en route. She states that she is not on any medications for her crohn's diease and only takes a probiotic. She is followed by GI. Has not had issues with Crohn's for several years. No fever or bloody stools.    Allergies:  Codeine   Morphine    Medications:  History reviewed.  No significant past medical history.      Past Medical History:  Colitis   Chron's diease     Past Surgical History:   ENT surgery   GYN surgery.      Family History:  History reviewed. No pertinent family history.      Social History:  Marital Status: Single  Presents to the ED alone via EMS  Tobacco Use: Never smoker  Alcohol Use: Yes     Review of Systems   Gastrointestinal: Positive for abdominal pain, diarrhea, nausea and vomiting.   All other systems reviewed and are negative.      Physical Exam   First Vitals:   BP Temp Temp src Pulse Heart Rate Resp SpO2 Height Weight   03/16/17 2325 117/67 98.1  F (36.7  C) Oral 66 66 18 100 % 1.676 m (5' 6\") 63.5 kg (140 lb)        Physical Exam  General: Moaning while lying on bed  Eyes:  The pupils are equal and round    Conjunctivae and sclerae are normal  ENT:    Atraumatic    Moist mucous membranes  Neck:  Normal range of motion  CV:  Regular rate and rhythm    Skin warm and well perfused   Resp:  Lungs are clear    Non-labored    No rales    No wheezing   GI:  Abdomen is soft, there is no rigidity    No distension    No rebound tenderness     No guarding    Mild diffuse abdominal tenderness  MS:  Normal muscular tone  Skin:  No rash or acute skin lesions noted  Neuro:   Awake, alert.      Speech is normal " and fluent.    Face is symmetric.     Moves all extremities  Psych:  Normal affect.  Appropriate interactions.    Emergency Department Course     Imaging:  CT Abdomen/Pelvis with contrast:   1. Mild wall thickening of the splenic flexure of colon through the  distal descending colon. This is suggestive of colitis that could be  infectious, inflammatory or ischemic in etiology.  2. No other cause of acute pain identified in the abdomen or pelvis.  Normal appendix.  Report per radiology.     Radiographic findings were communicated with the patient who voiced understanding of the findings.    Laboratory:  CBC:  WBC 12.9 (H), HGB 13.4,     CMP: Glucose 133 (H), otherwise WNL (Creatinine 0.67)     HCG Qualitative Pregnancy: Negative.     Lipase: 375    Lactic acid: 1.0    Interventions:  (2331) Normal Saline, 1 liter, IV bolus    (2234) Zofran, 4 mg, IV injection   (2354) Fentanyl 25 mcg, IV   (2356) Reglan, 10 mg, IV   (0024) Fentanyl 25 mcg, IV    (0155) Fentanyl 25 mcg, IV   (0251) Fentanyl 25 mcg, IV    (0318) Dilaudid, 0.2 mg, IV injection    Emergency Department Course:  Nursing notes and vitals reviewed.  I performed an exam of the patient as documented above.   IV inserted.   Blood was drawn from the patient. This was sent for laboratory testing, findings above.   The patient was sent for a CT of the abdomen and pelvis while in the emergency department, findings above.     Findings and plan explained to the patient who consents to admission.   (0244) I discussed the patient with Dr. Estrada of the hospitalist service, who will admit the patient to a inpatient med bed for further monitoring, evaluation, and treatment.  (0250) I consulted with Dr. Monet of [facility] regarding the patient.       Impression & Plan      Medical Decision Making:  Carina Chaney is a 56 year old female who presented to the emergency department with abdominal pain. Vital signs within normal limits. Complaining of diffuse abdominal  tenderness. She has mild diffuse abdominal tenderness on exam. Moaning while lying on the bed. Laboratory values obtained and CBC noted for mild elevation of white blood celd count at 12.0. CMP with mild elevation of glucose to 133. Lipase is normal and she is not pregnant. Given very small dose of fentanyl and pain resolved. Pain then would return but again resolve after small dose of fentanyl. CT abdomen performed given her abdominal tenderness and history of chron's and this does show thickening at the splenic flexure of the colon that is suggestive of colitis. This could be infectious, inflammatory or ischemic in etiology. Given this finding, I did obtain a lactate and this was normal. No history of atrial fibrillation, normal lactate so seems less likely ischemic in nature. No fever to suggest infectious. Patient was given repeated small doses of IV pain medication and would do much better with the pain for periods of time. Was not having bloody stools prior to ED. Did have bowel movement while in ED that she stated had small amount of blood. Given her pain and CT findings, will have her come into the hospital. I discussed the patient with the hospitalist who agreed to accept the patient. I discussed the case with [facility] as well to see if steroids or antibiotics would be recommended at this time and they recommended to hold off on antibiotics or steroids. They will see her in the morning and decide on further course of treatment. Stool studies ordered.    Diagnosis:    ICD-10-CM    1. Colitis K52.9      Disposition:  Admit to medicine.     Domenic ROUSSEAU, am serving as a scribe on 3/16/2017 at 11:23 PM to personally document services performed by Dr. Leigh based on my observations and the provider's statements to me.      3/16/2017    EMERGENCY DEPARTMENT       Samantha Leigh MD  03/17/17 0611

## 2017-03-18 LAB
ALBUMIN SERPL-MCNC: 3.4 G/DL (ref 3.4–5)
ALP SERPL-CCNC: 71 U/L (ref 40–150)
ALT SERPL W P-5'-P-CCNC: 18 U/L (ref 0–50)
ANION GAP SERPL CALCULATED.3IONS-SCNC: 9 MMOL/L (ref 3–14)
AST SERPL W P-5'-P-CCNC: 20 U/L (ref 0–45)
BASOPHILS # BLD AUTO: 0.1 10E9/L (ref 0–0.2)
BASOPHILS NFR BLD AUTO: 0.7 %
BILIRUB SERPL-MCNC: 0.6 MG/DL (ref 0.2–1.3)
BUN SERPL-MCNC: 8 MG/DL (ref 7–30)
CALCIUM SERPL-MCNC: 8.7 MG/DL (ref 8.5–10.1)
CHLORIDE SERPL-SCNC: 114 MMOL/L (ref 94–109)
CO2 SERPL-SCNC: 23 MMOL/L (ref 20–32)
CREAT SERPL-MCNC: 0.59 MG/DL (ref 0.52–1.04)
DIFFERENTIAL METHOD BLD: ABNORMAL
EOSINOPHIL # BLD AUTO: 0.2 10E9/L (ref 0–0.7)
EOSINOPHIL NFR BLD AUTO: 2.3 %
ERYTHROCYTE [DISTWIDTH] IN BLOOD BY AUTOMATED COUNT: 12.4 % (ref 10–15)
GFR SERPL CREATININE-BSD FRML MDRD: ABNORMAL ML/MIN/1.7M2
GLUCOSE SERPL-MCNC: 92 MG/DL (ref 70–99)
HCT VFR BLD AUTO: 34.4 % (ref 35–47)
HGB BLD-MCNC: 11.1 G/DL (ref 11.7–15.7)
IMM GRANULOCYTES # BLD: 0 10E9/L (ref 0–0.4)
IMM GRANULOCYTES NFR BLD: 0.1 %
INR PPP: 1.11 (ref 0.86–1.14)
LYMPHOCYTES # BLD AUTO: 2.5 10E9/L (ref 0.8–5.3)
LYMPHOCYTES NFR BLD AUTO: 34.9 %
MCH RBC QN AUTO: 30.4 PG (ref 26.5–33)
MCHC RBC AUTO-ENTMCNC: 32.3 G/DL (ref 31.5–36.5)
MCV RBC AUTO: 94 FL (ref 78–100)
MONOCYTES # BLD AUTO: 0.7 10E9/L (ref 0–1.3)
MONOCYTES NFR BLD AUTO: 10.5 %
NEUTROPHILS # BLD AUTO: 3.6 10E9/L (ref 1.6–8.3)
NEUTROPHILS NFR BLD AUTO: 51.5 %
NRBC # BLD AUTO: 0 10*3/UL
NRBC BLD AUTO-RTO: 0 /100
PLATELET # BLD AUTO: 211 10E9/L (ref 150–450)
POTASSIUM SERPL-SCNC: 3.7 MMOL/L (ref 3.4–5.3)
PROT SERPL-MCNC: 5.9 G/DL (ref 6.8–8.8)
RBC # BLD AUTO: 3.65 10E12/L (ref 3.8–5.2)
SODIUM SERPL-SCNC: 146 MMOL/L (ref 133–144)
WBC # BLD AUTO: 7 10E9/L (ref 4–11)

## 2017-03-18 PROCEDURE — 25000125 ZZHC RX 250: Performed by: HOSPITALIST

## 2017-03-18 PROCEDURE — 25000128 H RX IP 250 OP 636: Performed by: HOSPITALIST

## 2017-03-18 PROCEDURE — 12000007 ZZH R&B INTERMEDIATE

## 2017-03-18 PROCEDURE — 40000141 ZZH STATISTIC PERIPHERAL IV START W/O US GUIDANCE

## 2017-03-18 PROCEDURE — 36415 COLL VENOUS BLD VENIPUNCTURE: CPT | Performed by: HOSPITALIST

## 2017-03-18 PROCEDURE — 80053 COMPREHEN METABOLIC PANEL: CPT | Performed by: HOSPITALIST

## 2017-03-18 PROCEDURE — S0028 INJECTION, FAMOTIDINE, 20 MG: HCPCS | Performed by: HOSPITALIST

## 2017-03-18 PROCEDURE — 85025 COMPLETE CBC W/AUTO DIFF WBC: CPT | Performed by: HOSPITALIST

## 2017-03-18 PROCEDURE — 99232 SBSQ HOSP IP/OBS MODERATE 35: CPT | Performed by: HOSPITALIST

## 2017-03-18 PROCEDURE — 85610 PROTHROMBIN TIME: CPT | Performed by: HOSPITALIST

## 2017-03-18 RX ADMIN — SODIUM CHLORIDE: 9 INJECTION, SOLUTION INTRAVENOUS at 08:04

## 2017-03-18 RX ADMIN — ONDANSETRON HYDROCHLORIDE 4 MG: 2 SOLUTION INTRAMUSCULAR; INTRAVENOUS at 15:39

## 2017-03-18 RX ADMIN — ONDANSETRON HYDROCHLORIDE 4 MG: 2 SOLUTION INTRAMUSCULAR; INTRAVENOUS at 01:34

## 2017-03-18 RX ADMIN — HYDROMORPHONE HYDROCHLORIDE 0.5 MG: 1 INJECTION, SOLUTION INTRAMUSCULAR; INTRAVENOUS; SUBCUTANEOUS at 08:00

## 2017-03-18 RX ADMIN — FAMOTIDINE 20 MG: 10 INJECTION, SOLUTION INTRAVENOUS at 21:10

## 2017-03-18 RX ADMIN — HYDROMORPHONE HYDROCHLORIDE 0.5 MG: 1 INJECTION, SOLUTION INTRAMUSCULAR; INTRAVENOUS; SUBCUTANEOUS at 01:41

## 2017-03-18 RX ADMIN — SODIUM CHLORIDE: 9 INJECTION, SOLUTION INTRAVENOUS at 16:36

## 2017-03-18 RX ADMIN — HYDROMORPHONE HYDROCHLORIDE 0.5 MG: 1 INJECTION, SOLUTION INTRAMUSCULAR; INTRAVENOUS; SUBCUTANEOUS at 14:28

## 2017-03-18 RX ADMIN — FAMOTIDINE 20 MG: 10 INJECTION, SOLUTION INTRAVENOUS at 10:21

## 2017-03-18 NOTE — PLAN OF CARE
Problem: Goal Outcome Summary  Goal: Goal Outcome Summary  Outcome: Improving  Abd pain relieved with IV Dilaudid, up to BR with SBA, voiding, tolerating clear liquids.

## 2017-03-18 NOTE — PROGRESS NOTES
"GASTROENTEROLOGY PROGRESS NOTE     IMPRESSION:  Ileocolonic Crohn's with acute episode of Lt sided colitis, initial DDx Crohn's vs ischemic colitis    It is no longer recommended to proceed to endoscopic eval in the acute setting for suspected ischemic colitis due to the high risk of perforation.  Her presentation, however, seems to favor active Crohn's disease, as ischemic colitis resolves very quickly within a day or two of onset.     RECOMMENDATIONS:  Trial of diet today.   Consider trial of steroids pending diet tolerance.   Would not proceed to endoscopic eval with her current status, but will need this as an outpt    Greater than 30mins spent counseling/coordinating care, 25mins with the pt.     Kaleb Dailey DO   [facility]  Cell [phone]  ________________________________________________________________________      SUBJECTIVE:  Abd pain currently minimal.  Did have brief episodes of pain in the upper abd and RLQ last night.        OBJECTIVE:  /80 (BP Location: Right arm)  Pulse 59  Temp 98.5  F (36.9  C) (Oral)  Resp 16  Ht 1.676 m (5' 6\")  Wt 63.5 kg (140 lb)  SpO2 99%  BMI 22.6 kg/m2  Temp (24hrs), Av.5  F (36.9  C), Min:98.5  F (36.9  C), Max:98.6  F (37  C)    Patient Vitals for the past 72 hrs:   Weight   17 2325 63.5 kg (140 lb)       Intake/Output Summary (Last 24 hours) at 17 1051  Last data filed at 17 1937   Gross per 24 hour   Intake              967 ml   Output              350 ml   Net              617 ml      PHYSICAL EXAM  GEN: Alert, oriented x3, communicative and in NAD.    LYMPH: No LAD noted.  HRT: Regular  LUNGS: CTA  ABD: ND, +BS, no guarding or pain to palpation, no rebound, no HSM.  SKIN: No rash, jaundice or spider angiomata    Additional Data:  I have reviewed the patient's new clinical lab results:     Recent Labs   Lab Test  17   0600  17   2331   WBC  7.0  12.9*   HGB  11.1*  13.4   MCV  94  93   PLT  211  257   INR  1.11  "  --      Recent Labs   Lab Test  03/18/17   0600  03/16/17   2331   POTASSIUM  3.7  3.7   CHLORIDE  114*  102   CO2  23  27   BUN  8  13   ANIONGAP  9  11     Recent Labs   Lab Test  03/18/17   0600  03/17/17   0830  03/16/17   2331   ALBUMIN  3.4   --   4.4   BILITOTAL  0.6   --   0.5   ALT  18   --   22   AST  20   --   28   PROTEIN   --   10*   --    LIPASE   --    --   375

## 2017-03-18 NOTE — PROGRESS NOTES
[facility]    Hospitalist Progress Note    Date of Service (when I saw the patient): 03/18/2017    Assessment & Plan     Carina Chaney is a 56-year-old woman with history of Crohn's disease, not on any maintenance medication currently and takes probiotics and peppermint oil, presented to the ER with about 8 hours' duration of right lower quadrant pain, nausea, vomiting, diarrhea with mucus-like stool and blood.      1. Pain abdomen: suspected ischemic colitis vs due to acute exacerbation of Crohn's disease. She is not on any maintenance medication as above. Follows up with Dr. Odell. CT abdomen and pelvis on admission showed mild wall thickening of the splenic flexure of the colon through the distal descending colon, but patient was not tender in that area but rather was tender on right lower quadrant. Her lactic acid was normal and had mild leukocytosis.   - Stool tests including C. diff, culture, ova and parasite are ordered- no BM in the last 24 hours.   - GI evaluated patient, no steroid as exacerbation of crohn's unlikely  - Plan is to do flex sig today.  - Remains NPO  - COntinue IV hydration.   - Pain control with IV dilaudid, will add PO once diet resumed.  - Continue PPI.      2. Prior history of deep venous thrombosis. The patient had a cephalic and basilic vein thrombosis in 2014, was on rivaroxaban until 05/2015.   - Encourage ambulation  - Has developed likely Lt thrombophlebitis at the Lt forearm.   - use ice packs, if swelling or erythema migrates proximally, will consider US.     3. Feeding and nutrition. The patient remains n.p.o. Swabs to make her mouth moist, IV hydration as above. Follow electrolytes and replace as needed.     DVT Prophylaxis: Pneumatic Compression Devices    Code Status: Full Code    Disposition: Expected discharge within next 24 hours likely after GI work up and then diet resumed, and tolerats. Discussed with patient.    Roc Estrada,  MD  Hospitalist    Interval History      Patient was seen and examined, had 2 bouts of abdominal pain last night, but well controlled with pain medication. No nausea. No BM for over 24 hours. Afebrile.  -IV infiltrated Lt forearm, c/o pain.      -Data reviewed today: I reviewed all new labs and imaging results over the last 24 hours. I personally reviewed no images or EKG's today.    Physical Exam   Temp: 98.5  F (36.9  C) Temp src: Oral BP: 131/76 Pulse: 62 Heart Rate: 55 Resp: 16 SpO2: 97 % O2 Device: None (Room air)    Vitals:    03/16/17 2325   Weight: 63.5 kg (140 lb)     Vital Signs with Ranges  Temp:  [98.5  F (36.9  C)-98.8  F (37.1  C)] 98.5  F (36.9  C)  Pulse:  [62-69] 62  Heart Rate:  [55-61] 55  Resp:  [16] 16  BP: (118-131)/(67-77) 131/76  SpO2:  [97 %-99 %] 97 %  I/O last 3 completed shifts:  In: 967 [P.O.:480; I.V.:487]  Out: 500 [Urine:500]    Constitutional: Alert, awake. Not in distress.   HEENT: PERRLA, EOMI  Respiratory: Bilateral equal air entry, clear to auscultation. No respiratory distress.  Cardiovascular: Regular s1s2, no murmur, rub or gallop. No tachycardia.  GI: Soft, non distended, Rt Lower quadrant tenderness has resolved, bowel tones active.  Skin/Integumen: No rash, no blister. Mild erythema and tenderness Lt forearm.       Medications     NaCl 125 mL/hr at 03/17/17 1506       famotidine  20 mg Intravenous Q12H     influenza quadrivalent (PF) vacc age 3 yrs and older  0.5 mL Intramuscular Prior to discharge       Data     Recent Labs  Lab 03/18/17  0600 03/16/17  2331   WBC 7.0 12.9*   HGB 11.1* 13.4   MCV 94 93    257   INR 1.11  --    NA  --  140   POTASSIUM  --  3.7   CHLORIDE  --  102   CO2  --  27   BUN  --  13   CR  --  0.67   ANIONGAP  --  11   STACEY  --  9.6   GLC  --  133*   ALBUMIN  --  4.4   PROTTOTAL  --  7.6   BILITOTAL  --  0.5   ALKPHOS  --  104   ALT  --  22   AST  --  28   LIPASE  --  375       No results found for this or any previous visit (from the past 24  hour(s)).

## 2017-03-18 NOTE — PLAN OF CARE
Problem: Goal Outcome Summary  Goal: Goal Outcome Summary  Outcome: Improving  Pt A/O x4. Up indep in room. VSS. Voiding per BR. CMS intact. C/o abd pain relieved with IV dilaudid. IV infiltrated. Pt anxious. Warm pack applied. Nausea relieved with zofran. Will cont to monitor.

## 2017-03-18 NOTE — PLAN OF CARE
Problem: Individualization  Goal: Patient Preferences  Outcome: Improving  Pt A&Ox4, up independent in the room, voiding in the Bathroom, IV infusing. C/O abdominal pain IV dilaudid x 1,relief. Will continue to monitor.

## 2017-03-19 VITALS
DIASTOLIC BLOOD PRESSURE: 82 MMHG | TEMPERATURE: 98.1 F | WEIGHT: 140 LBS | HEART RATE: 55 BPM | BODY MASS INDEX: 22.5 KG/M2 | SYSTOLIC BLOOD PRESSURE: 137 MMHG | OXYGEN SATURATION: 96 % | HEIGHT: 66 IN | RESPIRATION RATE: 16 BRPM

## 2017-03-19 LAB
ANION GAP SERPL CALCULATED.3IONS-SCNC: 5 MMOL/L (ref 3–14)
BASOPHILS # BLD AUTO: 0.1 10E9/L (ref 0–0.2)
BASOPHILS NFR BLD AUTO: 0.8 %
BUN SERPL-MCNC: 5 MG/DL (ref 7–30)
CALCIUM SERPL-MCNC: 8.8 MG/DL (ref 8.5–10.1)
CHLORIDE SERPL-SCNC: 111 MMOL/L (ref 94–109)
CO2 SERPL-SCNC: 29 MMOL/L (ref 20–32)
CREAT SERPL-MCNC: 0.69 MG/DL (ref 0.52–1.04)
DIFFERENTIAL METHOD BLD: ABNORMAL
EOSINOPHIL # BLD AUTO: 0.3 10E9/L (ref 0–0.7)
EOSINOPHIL NFR BLD AUTO: 4.5 %
ERYTHROCYTE [DISTWIDTH] IN BLOOD BY AUTOMATED COUNT: 12 % (ref 10–15)
GFR SERPL CREATININE-BSD FRML MDRD: 89 ML/MIN/1.7M2
GLUCOSE SERPL-MCNC: 96 MG/DL (ref 70–99)
HCT VFR BLD AUTO: 33.8 % (ref 35–47)
HGB BLD-MCNC: 11.1 G/DL (ref 11.7–15.7)
IMM GRANULOCYTES # BLD: 0 10E9/L (ref 0–0.4)
IMM GRANULOCYTES NFR BLD: 0.2 %
LYMPHOCYTES # BLD AUTO: 2.5 10E9/L (ref 0.8–5.3)
LYMPHOCYTES NFR BLD AUTO: 40.7 %
MCH RBC QN AUTO: 30.7 PG (ref 26.5–33)
MCHC RBC AUTO-ENTMCNC: 32.8 G/DL (ref 31.5–36.5)
MCV RBC AUTO: 93 FL (ref 78–100)
MONOCYTES # BLD AUTO: 0.6 10E9/L (ref 0–1.3)
MONOCYTES NFR BLD AUTO: 9.9 %
NEUTROPHILS # BLD AUTO: 2.7 10E9/L (ref 1.6–8.3)
NEUTROPHILS NFR BLD AUTO: 43.9 %
NRBC # BLD AUTO: 0 10*3/UL
NRBC BLD AUTO-RTO: 0 /100
PLATELET # BLD AUTO: 213 10E9/L (ref 150–450)
POTASSIUM SERPL-SCNC: 3.8 MMOL/L (ref 3.4–5.3)
RBC # BLD AUTO: 3.62 10E12/L (ref 3.8–5.2)
SODIUM SERPL-SCNC: 145 MMOL/L (ref 133–144)
WBC # BLD AUTO: 6.1 10E9/L (ref 4–11)

## 2017-03-19 PROCEDURE — 25000128 H RX IP 250 OP 636: Performed by: HOSPITALIST

## 2017-03-19 PROCEDURE — 80048 BASIC METABOLIC PNL TOTAL CA: CPT | Performed by: HOSPITALIST

## 2017-03-19 PROCEDURE — 25000128 H RX IP 250 OP 636: Performed by: INTERNAL MEDICINE

## 2017-03-19 PROCEDURE — 90686 IIV4 VACC NO PRSV 0.5 ML IM: CPT | Performed by: INTERNAL MEDICINE

## 2017-03-19 PROCEDURE — 25000132 ZZH RX MED GY IP 250 OP 250 PS 637: Performed by: HOSPITALIST

## 2017-03-19 PROCEDURE — 25000125 ZZHC RX 250: Performed by: HOSPITALIST

## 2017-03-19 PROCEDURE — 85025 COMPLETE CBC W/AUTO DIFF WBC: CPT | Performed by: HOSPITALIST

## 2017-03-19 PROCEDURE — 99238 HOSP IP/OBS DSCHRG MGMT 30/<: CPT | Performed by: INTERNAL MEDICINE

## 2017-03-19 PROCEDURE — 36415 COLL VENOUS BLD VENIPUNCTURE: CPT | Performed by: HOSPITALIST

## 2017-03-19 PROCEDURE — S0028 INJECTION, FAMOTIDINE, 20 MG: HCPCS | Performed by: HOSPITALIST

## 2017-03-19 RX ORDER — BUDESONIDE 3 MG/1
9 CAPSULE, COATED PELLETS ORAL EVERY MORNING
Qty: 90 CAPSULE | Refills: 0 | Status: SHIPPED | OUTPATIENT
Start: 2017-03-19 | End: 2021-01-26

## 2017-03-19 RX ADMIN — SODIUM CHLORIDE: 9 INJECTION, SOLUTION INTRAVENOUS at 02:04

## 2017-03-19 RX ADMIN — ACETAMINOPHEN 650 MG: 325 TABLET, FILM COATED ORAL at 02:02

## 2017-03-19 RX ADMIN — HYDROMORPHONE HYDROCHLORIDE 0.5 MG: 1 INJECTION, SOLUTION INTRAMUSCULAR; INTRAVENOUS; SUBCUTANEOUS at 06:36

## 2017-03-19 RX ADMIN — FAMOTIDINE 20 MG: 10 INJECTION, SOLUTION INTRAVENOUS at 09:51

## 2017-03-19 RX ADMIN — INFLUENZA A VIRUS A/CALIFORNIA/7/2009 X-179A (H1N1) ANTIGEN (FORMALDEHYDE INACTIVATED), INFLUENZA A VIRUS A/HONG KONG/4801/2014 X-263B (H3N2) ANTIGEN (FORMALDEHYDE INACTIVATED), INFLUENZA B VIRUS B/PHUKET/3073/2013 ANTIGEN (FORMALDEHYDE INACTIVATED), AND INFLUENZA B VIRUS B/BRISBANE/60/2008 ANTIGEN (FORMALDEHYDE INACTIVATED) 0.5 ML: 15; 15; 15; 15 INJECTION, SUSPENSION INTRAMUSCULAR at 12:58

## 2017-03-19 NOTE — PLAN OF CARE
Problem: Goal Outcome Summary  Goal: Goal Outcome Summary  Outcome: Improving  Pt A/O x4. Indep in room. Tolerating clear liquids. PO tyelnol for muscle pain. IV dilaudid x1 for abd pain. sm formed brown BM. Pain increased following BM. Voiding per HI. VSS. IVF infusing. Will cont to monitor.

## 2017-03-19 NOTE — PROGRESS NOTES
GI    Pt improving with diet.  Rather intense pain prior to BM this AM, but no bleeding, formed stool.  Diet this AM went well, without recurrent pain.      Abd soft, NTTP.    Imp/recs:  -Unlikely ischemic colitis, more likely that this admit was due to her Crohn's.    -Okay with plan for dispo  -Please d/c on entocort 9mg daily, provide 30 day supply  -I have left a message for Dr. Odell regarding this hospitalization - asked the patient to contact her early in the week to arrange follow up.    Will sign off    Kaleb Dailey DO   [facility]  Cell [phone]

## 2017-03-19 NOTE — DISCHARGE SUMMARY
DATE OF ADMISSION:  03/16/2017.      DATE OF DISCHARGE:  03/19/2017.      PRIMARY CARE PHYSICIAN:  None given.      DISCHARGE DIAGNOSIS:  Acute exacerbation of Crohn's disease.      HOSPITAL COURSE:  Carina Chaney is a 56-year-old female who presented to the ER with abdominal pain, nausea, vomiting and diarrhea.  The pain was located in the right lower quadrant.  It was quite severe on presentation, accompanied with at least 10-12 bowel movements which were with mucous and a bit of blood.  She denied any fever.  She was admitted as an inpatient.  Her symptoms spontaneously subsided during this hospital course over here.  She was tolerating oral intake.  She was seen by Gastroenterology who felt that this was likely due to acute exacerbation of Crohn's.  She was tolerating her diet without any issues.   On the day of discharge, the patient denies any abdominal pain.  She denies any fevers or chills.  She denies any ongoing diarrhea.      PHYSICAL EXAMINATION:   VITAL SIGNS:  Temperature is 98.1, pulse 55, respiratory rate 16, blood pressure is 137/82.  O2 sat is 96%.   GENERAL:  Alert, awake, oriented, coherent, nontoxic, in no acute distress.   LUNGS:  Clear to auscultation.   HEART:  Regular rate, S1, S2 normal.   ABDOMEN:  Soft, nontender, with good bowel sounds.   EXTREMITIES:  There was no edema.      LABORATORY DATA:  Labwork obtained today included a basic metabolic panel which was grossly within normal limits.      IMAGING OBTAINED DURING THIS HOSPITALIZATION:  Included the following:  CT of the abdomen and pelvis which showed mild wall thickening of the splenic flexure of the colon through the distal descending colon.  This is suggestive of colitis that could be infectious, inflammatory or ischemic.  Normal appendix.      CONSULTATIONS OBTAINED DURING THIS HOSPITALIZATION:  Gastroenterology.      DISCHARGE MEDICATIONS:   NEW MEDICATIONS:  Will include Entocort 9 mg p.o. q.a.m.      FOLLOWUP:  Will have her  follow up with her primary care physician as well as a gastroenterologist in the next week.         LAVINIA GREGG MD             D: 2017 12:24   T: 2017 12:49   MT: LUIZ      Name:     RACHEL SALCEDO   MRN:      [id number]        Account:        [id number]   :      1960           Admit Date:                                       Discharge Date:       Document: [id number]

## 2017-06-17 ENCOUNTER — HEALTH MAINTENANCE LETTER (OUTPATIENT)
Age: 57
End: 2017-06-17

## 2018-11-09 ENCOUNTER — TELEPHONE (OUTPATIENT)
Dept: OTHER | Facility: CLINIC | Age: 58
End: 2018-11-09

## 2018-11-09 NOTE — TELEPHONE ENCOUNTER
11/9/2018    Call Regarding Onboarding are Choices    Attempt 1    Message on voicemail     Comments: 0 DEP      Outreach   CC

## 2019-02-04 ENCOUNTER — TRANSFERRED RECORDS (OUTPATIENT)
Dept: HEALTH INFORMATION MANAGEMENT | Facility: CLINIC | Age: 59
End: 2019-02-04

## 2019-10-01 ENCOUNTER — HEALTH MAINTENANCE LETTER (OUTPATIENT)
Age: 59
End: 2019-10-01

## 2021-01-15 ENCOUNTER — HEALTH MAINTENANCE LETTER (OUTPATIENT)
Age: 61
End: 2021-01-15

## 2021-01-26 ENCOUNTER — OFFICE VISIT (OUTPATIENT)
Dept: FAMILY MEDICINE | Facility: CLINIC | Age: 61
End: 2021-01-26
Payer: COMMERCIAL

## 2021-01-26 VITALS
BODY MASS INDEX: 23.5 KG/M2 | TEMPERATURE: 97.6 F | WEIGHT: 146.2 LBS | HEART RATE: 75 BPM | SYSTOLIC BLOOD PRESSURE: 102 MMHG | OXYGEN SATURATION: 100 % | HEIGHT: 66 IN | DIASTOLIC BLOOD PRESSURE: 72 MMHG

## 2021-01-26 DIAGNOSIS — Z12.11 ENCOUNTER FOR SCREENING FOR MALIGNANT NEOPLASM OF COLON: ICD-10-CM

## 2021-01-26 DIAGNOSIS — J45.20 MILD INTERMITTENT ASTHMA WITHOUT COMPLICATION: ICD-10-CM

## 2021-01-26 DIAGNOSIS — Z11.59 ENCOUNTER FOR HEPATITIS C SCREENING TEST FOR LOW RISK PATIENT: ICD-10-CM

## 2021-01-26 DIAGNOSIS — K58.9 IRRITABLE BOWEL SYNDROME, UNSPECIFIED TYPE: ICD-10-CM

## 2021-01-26 DIAGNOSIS — Z11.4 ENCOUNTER FOR SCREENING FOR HIV: ICD-10-CM

## 2021-01-26 DIAGNOSIS — Z12.31 ENCOUNTER FOR SCREENING MAMMOGRAM FOR BREAST CANCER: ICD-10-CM

## 2021-01-26 DIAGNOSIS — I82.721 CHRONIC DEEP VEIN THROMBOSIS (DVT) OF BRACHIAL VEIN OF RIGHT UPPER EXTREMITY (H): ICD-10-CM

## 2021-01-26 DIAGNOSIS — K50.813 CROHN'S DISEASE OF BOTH SMALL AND LARGE INTESTINE WITH FISTULA (H): Primary | ICD-10-CM

## 2021-01-26 DIAGNOSIS — K50.113: ICD-10-CM

## 2021-01-26 DIAGNOSIS — Z13.220 ENCOUNTER FOR LIPID SCREENING FOR CARDIOVASCULAR DISEASE: ICD-10-CM

## 2021-01-26 DIAGNOSIS — F12.90 MARIJUANA SMOKER: ICD-10-CM

## 2021-01-26 DIAGNOSIS — Z13.6 ENCOUNTER FOR LIPID SCREENING FOR CARDIOVASCULAR DISEASE: ICD-10-CM

## 2021-01-26 DIAGNOSIS — K60.40: ICD-10-CM

## 2021-01-26 DIAGNOSIS — Z23 NEED FOR VACCINATION: ICD-10-CM

## 2021-01-26 PROBLEM — J45.909 REACTIVE AIRWAY DISEASE WITHOUT COMPLICATION: Status: ACTIVE | Noted: 2018-10-01

## 2021-01-26 PROBLEM — J31.0 RHINITIS: Status: ACTIVE | Noted: 2017-11-22

## 2021-01-26 LAB
ALBUMIN SERPL-MCNC: 4 G/DL (ref 3.4–5)
ALP SERPL-CCNC: 88 U/L (ref 40–150)
ALT SERPL W P-5'-P-CCNC: 22 U/L (ref 0–50)
ANION GAP SERPL CALCULATED.3IONS-SCNC: 4 MMOL/L (ref 3–14)
AST SERPL W P-5'-P-CCNC: 23 U/L (ref 0–45)
BASOPHILS # BLD AUTO: 0 10E9/L (ref 0–0.2)
BASOPHILS NFR BLD AUTO: 0.5 %
BILIRUB SERPL-MCNC: 0.3 MG/DL (ref 0.2–1.3)
BUN SERPL-MCNC: 12 MG/DL (ref 7–30)
CALCIUM SERPL-MCNC: 9.6 MG/DL (ref 8.5–10.1)
CHLORIDE SERPL-SCNC: 108 MMOL/L (ref 94–109)
CHOLEST SERPL-MCNC: 166 MG/DL
CO2 SERPL-SCNC: 28 MMOL/L (ref 20–32)
CREAT SERPL-MCNC: 0.61 MG/DL (ref 0.52–1.04)
DEPRECATED CALCIDIOL+CALCIFEROL SERPL-MC: 34 UG/L (ref 20–75)
DIFFERENTIAL METHOD BLD: NORMAL
EOSINOPHIL # BLD AUTO: 0.2 10E9/L (ref 0–0.7)
EOSINOPHIL NFR BLD AUTO: 2.7 %
ERYTHROCYTE [DISTWIDTH] IN BLOOD BY AUTOMATED COUNT: 11.9 % (ref 10–15)
GFR SERPL CREATININE-BSD FRML MDRD: >90 ML/MIN/{1.73_M2}
GLUCOSE SERPL-MCNC: 88 MG/DL (ref 70–99)
HCT VFR BLD AUTO: 40.9 % (ref 35–47)
HDLC SERPL-MCNC: 63 MG/DL
HGB BLD-MCNC: 13.3 G/DL (ref 11.7–15.7)
HIV 1+2 AB+HIV1 P24 AG SERPL QL IA: NONREACTIVE
LDLC SERPL CALC-MCNC: 89 MG/DL
LYMPHOCYTES # BLD AUTO: 2.3 10E9/L (ref 0.8–5.3)
LYMPHOCYTES NFR BLD AUTO: 37.7 %
MCH RBC QN AUTO: 30.9 PG (ref 26.5–33)
MCHC RBC AUTO-ENTMCNC: 32.5 G/DL (ref 31.5–36.5)
MCV RBC AUTO: 95 FL (ref 78–100)
MONOCYTES # BLD AUTO: 0.5 10E9/L (ref 0–1.3)
MONOCYTES NFR BLD AUTO: 8.1 %
NEUTROPHILS # BLD AUTO: 3.2 10E9/L (ref 1.6–8.3)
NEUTROPHILS NFR BLD AUTO: 51 %
NONHDLC SERPL-MCNC: 103 MG/DL
PLATELET # BLD AUTO: 250 10E9/L (ref 150–450)
POTASSIUM SERPL-SCNC: 4.3 MMOL/L (ref 3.4–5.3)
PROT SERPL-MCNC: 7.6 G/DL (ref 6.8–8.8)
RBC # BLD AUTO: 4.3 10E12/L (ref 3.8–5.2)
SODIUM SERPL-SCNC: 140 MMOL/L (ref 133–144)
TRIGL SERPL-MCNC: 71 MG/DL
WBC # BLD AUTO: 6.2 10E9/L (ref 4–11)

## 2021-01-26 PROCEDURE — 90750 HZV VACC RECOMBINANT IM: CPT | Performed by: INTERNAL MEDICINE

## 2021-01-26 PROCEDURE — 99204 OFFICE O/P NEW MOD 45 MIN: CPT | Mod: 25 | Performed by: INTERNAL MEDICINE

## 2021-01-26 PROCEDURE — 87389 HIV-1 AG W/HIV-1&-2 AB AG IA: CPT | Performed by: INTERNAL MEDICINE

## 2021-01-26 PROCEDURE — 80053 COMPREHEN METABOLIC PANEL: CPT | Performed by: INTERNAL MEDICINE

## 2021-01-26 PROCEDURE — 36415 COLL VENOUS BLD VENIPUNCTURE: CPT | Performed by: INTERNAL MEDICINE

## 2021-01-26 PROCEDURE — 85025 COMPLETE CBC W/AUTO DIFF WBC: CPT | Performed by: INTERNAL MEDICINE

## 2021-01-26 PROCEDURE — 82306 VITAMIN D 25 HYDROXY: CPT | Performed by: INTERNAL MEDICINE

## 2021-01-26 PROCEDURE — 90471 IMMUNIZATION ADMIN: CPT | Performed by: INTERNAL MEDICINE

## 2021-01-26 PROCEDURE — 80061 LIPID PANEL: CPT | Performed by: INTERNAL MEDICINE

## 2021-01-26 RX ORDER — ALBUTEROL SULFATE 90 UG/1
2 AEROSOL, METERED RESPIRATORY (INHALATION) EVERY 6 HOURS
Qty: 1 INHALER | Refills: 1 | Status: SHIPPED | OUTPATIENT
Start: 2021-01-26 | End: 2021-12-17

## 2021-01-26 RX ORDER — SAW/VIT E/SOD SEL/LYC/BETA/PYG 160-100
TABLET ORAL
Qty: 180 CAPSULE | Refills: 1 | Status: SHIPPED | OUTPATIENT
Start: 2021-01-26 | End: 2021-08-26

## 2021-01-26 RX ORDER — MULTIVIT-MIN/IRON/FOLIC ACID/K 18-600-40
CAPSULE ORAL
COMMUNITY
End: 2022-11-18

## 2021-01-26 RX ORDER — SAW/VIT E/SOD SEL/LYC/BETA/PYG 160-100
TABLET ORAL
COMMUNITY
End: 2021-01-26

## 2021-01-26 ASSESSMENT — ANXIETY QUESTIONNAIRES
GAD7 TOTAL SCORE: 0
6. BECOMING EASILY ANNOYED OR IRRITABLE: NOT AT ALL
5. BEING SO RESTLESS THAT IT IS HARD TO SIT STILL: NOT AT ALL
3. WORRYING TOO MUCH ABOUT DIFFERENT THINGS: NOT AT ALL
IF YOU CHECKED OFF ANY PROBLEMS ON THIS QUESTIONNAIRE, HOW DIFFICULT HAVE THESE PROBLEMS MADE IT FOR YOU TO DO YOUR WORK, TAKE CARE OF THINGS AT HOME, OR GET ALONG WITH OTHER PEOPLE: NOT DIFFICULT AT ALL
1. FEELING NERVOUS, ANXIOUS, OR ON EDGE: NOT AT ALL
2. NOT BEING ABLE TO STOP OR CONTROL WORRYING: NOT AT ALL
7. FEELING AFRAID AS IF SOMETHING AWFUL MIGHT HAPPEN: NOT AT ALL

## 2021-01-26 ASSESSMENT — MIFFLIN-ST. JEOR: SCORE: 1259.91

## 2021-01-26 ASSESSMENT — PATIENT HEALTH QUESTIONNAIRE - PHQ9
5. POOR APPETITE OR OVEREATING: NOT AT ALL
SUM OF ALL RESPONSES TO PHQ QUESTIONS 1-9: 1

## 2021-01-26 NOTE — LETTER
January 27, 2021      Carina Chaney  [address]  [place] [facility] [address]        Dear ,    We are writing to inform you of your test results.    All your labs are normal, there might be some highlighted which doesn't have any clinical significance.     Suzie Hernandez MD   Internal medicine physician   [facility]     Resulted Orders   Comprehensive metabolic panel   Result Value Ref Range    Sodium 140 133 - 144 mmol/L    Potassium 4.3 3.4 - 5.3 mmol/L    Chloride 108 94 - 109 mmol/L    Carbon Dioxide 28 20 - 32 mmol/L    Anion Gap 4 3 - 14 mmol/L    Glucose 88 70 - 99 mg/dL      Comment:      Fasting specimen    Urea Nitrogen 12 7 - 30 mg/dL    Creatinine 0.61 0.52 - 1.04 mg/dL    GFR Estimate >90 >60 mL/min/[1.73_m2]      Comment:      Non  GFR Calc  Starting 12/18/2018, serum creatinine based estimated GFR (eGFR) will be   calculated using the Chronic Kidney Disease Epidemiology Collaboration   (CKD-EPI) equation.      GFR Estimate If Black >90 >60 mL/min/[1.73_m2]      Comment:       GFR Calc  Starting 12/18/2018, serum creatinine based estimated GFR (eGFR) will be   calculated using the Chronic Kidney Disease Epidemiology Collaboration   (CKD-EPI) equation.      Calcium 9.6 8.5 - 10.1 mg/dL    Bilirubin Total 0.3 0.2 - 1.3 mg/dL    Albumin 4.0 3.4 - 5.0 g/dL    Protein Total 7.6 6.8 - 8.8 g/dL    Alkaline Phosphatase 88 40 - 150 U/L    ALT 22 0 - 50 U/L    AST 23 0 - 45 U/L   CBC with platelets differential   Result Value Ref Range    WBC 6.2 4.0 - 11.0 10e9/L    RBC Count 4.30 3.8 - 5.2 10e12/L    Hemoglobin 13.3 11.7 - 15.7 g/dL    Hematocrit 40.9 35.0 - 47.0 %    MCV 95 78 - 100 fl    MCH 30.9 26.5 - 33.0 pg    MCHC 32.5 31.5 - 36.5 g/dL    RDW 11.9 10.0 - 15.0 %    Platelet Count 250 150 - 450 10e9/L    % Neutrophils 51.0 %    % Lymphocytes 37.7 %    % Monocytes 8.1 %    % Eosinophils 2.7 %    % Basophils 0.5 %    Absolute Neutrophil 3.2 1.6  - 8.3 10e9/L    Absolute Lymphocytes 2.3 0.8 - 5.3 10e9/L    Absolute Monocytes 0.5 0.0 - 1.3 10e9/L    Absolute Eosinophils 0.2 0.0 - 0.7 10e9/L    Absolute Basophils 0.0 0.0 - 0.2 10e9/L    Diff Method Automated Method    Vitamin D Deficiency   Result Value Ref Range    Vitamin D Deficiency screening 34 20 - 75 ug/L      Comment:      Season, race, dietary intake, and treatment affect the concentration of   25-hydroxy-Vitamin D. Values may decrease during winter months and increase   during summer months. Values 20-29 ug/L may indicate Vitamin D insufficiency   and values <20 ug/L may indicate Vitamin D deficiency.  Vitamin D determination is routinely performed by an immunoassay specific for   25 hydroxyvitamin D3.  If an individual is on vitamin D2 (ergocalciferol)   supplementation, please specify 25 OH vitamin D2 and D3 level determination by   LCMSMS test VITD23.     Lipid panel reflex to direct LDL Fasting   Result Value Ref Range    Cholesterol 166 <200 mg/dL    Triglycerides 71 <150 mg/dL      Comment:      Fasting specimen    HDL Cholesterol 63 >49 mg/dL    LDL Cholesterol Calculated 89 <100 mg/dL      Comment:      Desirable:       <100 mg/dl    Non HDL Cholesterol 103 <130 mg/dL   HIV Antigen Antibody Combo   Result Value Ref Range    HIV Antigen Antibody Combo Nonreactive NR^Nonreactive          Comment:      HIV-1 p24 Ag & HIV-1/HIV-2 Ab Not Detected       If you have any questions or concerns, please call the clinic at the number listed above.       Sincerely,      Suzie Hernandez MD

## 2021-01-26 NOTE — PROGRESS NOTES
Assessment and Plan  1. Crohn's disease of both small and large intestine with fistula (H)  2. Crohn's disease of rectum with fistula (H)  Well controlled, emphasized on repeat Colonoscopy to be discussed with [facility] but patient has had bitter experiences in the past and will think over it. Given instructions to follow up with GI whom she has not seen since 2018. Will do baseline lab work given Crohns.risk factor.   - Comprehensive metabolic panel  - CBC with platelets differential  - Vitamin D Deficiency    3. Marijuana smoker  Stable, pt has observed symptom relief and feels to continue it.     4. Irritable bowel syndrome, unspecified type  Stable, discussed on Probiotic usage.   - Probiotic Product (PROBIOTIC & ACIDOPHILUS EX ST) CAPS; Take 1 cap twice a day  Dispense: 180 capsule; Refill: 1    5. Mild intermittent asthma without complication  Well controlled. Previous Hx of Hospitalizations with respiratory issues while in [facility] but denies any symptoms at this time. Given instructions to have rescue inhaler handy though asymptomatic at this time. AAP given in office today.  - albuterol (PROAIR HFA/PROVENTIL HFA/VENTOLIN HFA) 108 (90 Base) MCG/ACT inhaler; Inhale 2 puffs into the lungs every 6 hours  Dispense: 1 Inhaler; Refill: 1    6. Chronic deep vein thrombosis (DVT) of brachial vein of right upper extremity (H)  No symptoms at this time. Remote Hx in 2014.     7. Encounter for screening for malignant neoplasm of colon  - Fecal colorectal cancer screen (FIT); Future    8. Encounter for screening mammogram for breast cancer  - MA Screen Bilateral w/Josiah; Future    9. Encounter for hepatitis C screening test for low risk patient  - **Hepatitis C Screen Reflex to RNA FUTURE anytime; Future    10. Encounter for screening for HIV  - HIV Antigen Antibody Combo    11. Encounter for lipid screening for cardiovascular disease  - Lipid panel reflex to direct LDL Fasting    12. Need for vaccination  - ZOSTER  VACCINE RECOMBINANT ADJUVANTED IM NJX     Patient Instructions   Please do the lab work given.  Please folllow the recomnendations on Uptodate as discussed on the patient education materials given.     =============================    https://[place].gov/covid19[place]vaccine/find-vaccine/index.jsp?[email or web address]       Patient Education     Discharge Instructions for Crohn s Disease   You have Crohn s disease. Your digestive tract is swollen and inflamed. All layers of your digestive tract may be affected. There is no cure for Crohn s disease. But you can get treatment for the symptoms. Help manage your symptoms by following your healthcare provider s advice and watching what you eat.  Home care  Here are some recommendations for taking care of yourself at home:    Work closely with your healthcare provider to find the types of treatment that are best for you.    Take your medicines exactly as you were told.  ? Let your healthcare provider know if you are having uncomfortable side effects.  ? Don t stop taking your medicines without talking with your healthcare provider first.    It may be helpful to stay away from certain foods for a little while. Depending on your condition, these may include caffeine (coffee, tea, and cola), spicy foods, milk products, and raw fruits and vegetables. For certain people, these can be hard to digest and can worsen symptoms in a flare-up. Your healthcare provider may have you work with a nutritionist to come up with the best food choices for you.    Try eating several small meals a day instead of 3 large ones.    Don't smoke. Tobacco smoking makes the disease worse.     Keep appointments for all checkups even if you are not having symptoms.    Talk to your healthcare provider about surgery for Crohn s disease. Surgery won t cure Crohn s disease. But it may help control the symptoms. Only you and your healthcare provider can decide if this  "choice is right for you.    Learn more about your condition. Contact the Crohn s and Colitis Foundation toll-free at [phone] or www.ccfa.org.  Managing nutrition  You may be able to eat most foods until you have a flare-up. But like anyone else, you need to make healthy eating choices. Some of the healthiest foods can make symptoms worse, though. Keeping track of your \"problem foods\" may help. Ask your healthcare provider any questions you have about healthy eating.  When to call your healthcare provider  Call your healthcare provider right away if you have any of these:    Severe pain or bloating in your belly after meals    Sores in your mouth    Sores in your anal area (around your rectum)    Fever of  100.4  F ( 38 C) or higher, or as directed by your healthcare provider    Chills    Poor appetite or weight loss    Bloody diarrhea    Nausea or vomiting    Skin rashes or skin that weeps (or drains)    Changes in your vision  [employer] last reviewed this educational content on 6/1/2019 2000-2020 [employer]. [address] [place] [address]. All rights reserved. This information is not intended as a substitute for professional medical care. Always follow your healthcare professional's instructions.               Return in about 4 weeks (around 2/23/2021), or if symptoms worsen or fail to improve, for Preventative Visit , PAP.    Suzie Hernandez MD  [facility] [place] is a 60 year old who presents to clinic today for the following health issues.    HPI       New Patient/Transfer of Care  Chrones Disease and factors regarding where she stands with the COVID vaccine.    How many servings of fruits and vegetables do you eat daily?  0-1    On average, how many sweetened beverages do you drink each day (Examples: soda, juice, sweet tea, etc.  Do NOT count diet or artificially sweetened beverages)? 1     How many days per week do you exercise " "enough to make your heart beat faster? 3 or less    How many minutes a day do you exercise enough to make your heart beat faster? 10 - 19    How many days per week do you miss taking your medication? 0       Seeing this patient first time today, last visit with [facility] on hospital visit in 2017 but no OV seen in last 3 yrs. Followed Dr. Maradiaga as PCP with [facility].    Have concerns of Crohns disease, has been following outside facilities. Reviewed Care everywhere with recent dental issues. Has Hx of DVT, IBS, Rectal fitula on problem list from care everywhere.    Last lab work of CMP , CBC in 7/2020 normal ; Vit.D low in 2016 , Lipid panel in 2017 normal, Last Colonoscopy in 2015 ,CT abdomen improved colon mucosa thickening &Fbroid unterus.       Allergies   Allergen Reactions     Hydromorphone Nausea and Vomiting     Morphine Nausea and Vomiting and Other (See Comments)     Tolerates oxycodone 5/2014  Severe nausea and vomiting  \"violent vomiting.\"       Alcohol Unknown     Ciprofloxacin Nausea and Vomiting and Other (See Comments)     \"cramps\"  Cramps, nausea, vomiting.  Can take with caution if really necessary.       Codeine      Prednisone Unknown     Sodium Phosphate Nausea and Vomiting        Past Medical History:   Diagnosis Date     Colitis      Crohn disease (H)        Past Surgical History:   Procedure Laterality Date     ENT SURGERY       GYN SURGERY         History reviewed. No pertinent family history.    Social History     Tobacco Use     Smoking status: Never Smoker     Smokeless tobacco: Never Used   Substance Use Topics     Alcohol use: Yes        Current Outpatient Medications   Medication     albuterol (PROAIR HFA/PROVENTIL HFA/VENTOLIN HFA) 108 (90 Base) MCG/ACT inhaler     PEPPERMINT OIL PO     Probiotic Product (PROBIOTIC & ACIDOPHILUS EX ST) CAPS     Cholecalciferol (VITAMIN D) 50 MCG (2000 UT) CAPS     diltiazem 2% in PLO cream, [facility] COMPOUNDED, 2% GEL     Fish Oil-Cholecalciferol (FISH " "OIL + D3) 2751-7818 MG-UNIT CAPS     hyoscyamine (LEVSIN/SL) 0.125 MG sublingual tablet     Lactobacillus Rhamnosus, GG, (PROBIOTIC COLIC PO)     MAGNESIUM CHLORIDE ER PO     Ondansetron HCl (ZOFRAN PO)     Prenatal Vit-Fe Fumarate-FA (M-VIT) TABS     Probiotic Product (PROBIOTIC PO)     TURMERIC PO     No current facility-administered medications for this visit.         Review of Systems   Constitutional, HEENT, cardiovascular, pulmonary, GI, , musculoskeletal, neuro, skin, endocrine and psych systems are negative, except as otherwise noted.      Objective    /72   Pulse 75   Temp 97.6  F (36.4  C) (Tympanic)   Ht 1.676 m (5' 6\")   Wt 66.3 kg (146 lb 3.2 oz)   LMP  (LMP Unknown)   SpO2 100%   BMI 23.60 kg/m    Body mass index is 23.6 kg/m .  Physical Exam   GENERAL: healthy, alert and no distress  ENT Exam : Ear canals and TM's normal, nose and mouth without ulcers or lesions, NO pharyngeal erythema, Cervical lymphadenopathy.  NECK: no adenopathy, no asymmetry, masses, or scars and thyroid normal to palpation  RESP: lungs clear to auscultation - no rales, rhonchi or wheezes  CV: regular rate and rhythm, normal S1 S2, no S3 or S4, no murmur, click or rub, no peripheral edema and peripheral pulses strong  ABDOMEN: soft, nontender, no hepatosplenomegaly, no masses and bowel sounds normal  MS: no gross musculoskeletal defects noted, no edema    Labs and imaging reviewed and discussed with the patient.      "

## 2021-01-26 NOTE — PATIENT INSTRUCTIONS
Please do the lab work given.  Please folllow the recomnendations on Uptodate as discussed on the patient education materials given.     =============================    https://[place].gov/covid19/vaccine/find-vaccine/index.jsp?fbclid=[email or web address]       Patient Education     Discharge Instructions for Crohn s Disease   You have Crohn s disease. Your digestive tract is swollen and inflamed. All layers of your digestive tract may be affected. There is no cure for Crohn s disease. But you can get treatment for the symptoms. Help manage your symptoms by following your healthcare provider s advice and watching what you eat.  Home care  Here are some recommendations for taking care of yourself at home:    Work closely with your healthcare provider to find the types of treatment that are best for you.    Take your medicines exactly as you were told.  ? Let your healthcare provider know if you are having uncomfortable side effects.  ? Don t stop taking your medicines without talking with your healthcare provider first.    It may be helpful to stay away from certain foods for a little while. Depending on your condition, these may include caffeine (coffee, tea, and cola), spicy foods, milk products, and raw fruits and vegetables. For certain people, these can be hard to digest and can worsen symptoms in a flare-up. Your healthcare provider may have you work with a nutritionist to come up with the best food choices for you.    Try eating several small meals a day instead of 3 large ones.    Don't smoke. Tobacco smoking makes the disease worse.     Keep appointments for all checkups even if you are not having symptoms.    Talk to your healthcare provider about surgery for Crohn s disease. Surgery won t cure Crohn s disease. But it may help control the symptoms. Only you and your healthcare provider can decide if this choice is right for you.    Learn more about your condition.  "Contact the Crohn s and Colitis Foundation toll-free at [phone] or www.ccfa.org.  Managing nutrition  You may be able to eat most foods until you have a flare-up. But like anyone else, you need to make healthy eating choices. Some of the healthiest foods can make symptoms worse, though. Keeping track of your \"problem foods\" may help. Ask your healthcare provider any questions you have about healthy eating.  When to call your healthcare provider  Call your healthcare provider right away if you have any of these:    Severe pain or bloating in your belly after meals    Sores in your mouth    Sores in your anal area (around your rectum)    Fever of  100.4  F ( 38 C) or higher, or as directed by your healthcare provider    Chills    Poor appetite or weight loss    Bloody diarrhea    Nausea or vomiting    Skin rashes or skin that weeps (or drains)    Changes in your vision  [employer] last reviewed this educational content on 6/1/2019 2000-2020 [employer]. [address] [place] [address], [place] [address]. All rights reserved. This information is not intended as a substitute for professional medical care. Always follow your healthcare professional's instructions.             "

## 2021-01-26 NOTE — LETTER
My Asthma Action Plan    Name: Carina Chaney   YOB: 1960  Date: 1/26/2021   My doctor: Suzie Hernandez MD   My clinic: [facility] [place]        My Rescue Medicine:   Albuterol inhaler (Proair/Ventolin/Proventil HFA)  2-4 puffs EVERY 4 HOURS as needed. Use a spacer if recommended by your provider.   My Asthma Severity:   Intermittent / Exercise Induced  Know your asthma triggers: Patient is unaware of triggers             GREEN ZONE   Good Control    I feel good    No cough or wheeze    Can work, sleep and play without asthma symptoms       Take your asthma control medicine every day.     1. If exercise triggers your asthma, take your rescue medication    15 minutes before exercise or sports, and    During exercise if you have asthma symptoms  2. Spacer to use with inhaler: If you have a spacer, make sure to use it with your inhaler             YELLOW ZONE Getting Worse  I have ANY of these:    I do not feel good    Cough or wheeze    Chest feels tight    Wake up at night   1. Keep taking your Green Zone medications  2. Start taking your rescue medicine:    every 20 minutes for up to 1 hour. Then every 4 hours for 24-48 hours.  3. If you stay in the Yellow Zone for more than 12-24 hours, contact your doctor.  4. If you do not return to the Green Zone in 12-24 hours or you get worse, start taking your oral steroid medicine if prescribed by your provider.           RED ZONE Medical Alert - Get Help  I have ANY of these:    I feel awful    Medicine is not helping    Breathing getting harder    Trouble walking or talking    Nose opens wide to breathe       1. Take your rescue medicine NOW  2. If your provider has prescribed an oral steroid medicine, start taking it NOW  3. Call your doctor NOW  4. If you are still in the Red Zone after 20 minutes and you have not reached your doctor:    Take your rescue medicine again and    Call 911 or go to the emergency room right away    See  your regular doctor within 2 weeks of an Emergency Room or Urgent Care visit for follow-up treatment.          Annual Reminders:  Meet with Asthma Educator,  Flu Shot in the Fall, consider Pneumonia Vaccination for patients with asthma (aged 19 and older).    Pharmacy: [id number]  [place] [phone] [facility]    Electronically signed by Suzie Hernandez MD   Date: 01/26/21                    Asthma Triggers  How To Control Things That Make Your Asthma Worse    Triggers are things that make your asthma worse.  Look at the list below to help you find your triggers and   what you can do about them. You can help prevent asthma flare-ups by staying away from your triggers.      Trigger                                                          What you can do   Cigarette Smoke  Tobacco smoke can make asthma worse. Do not allow smoking in your home, car or around you.  Be sure no one smokes at a child s day care or school.  If you smoke, ask your health care provider for ways to help you quit.  Ask family members to quit too.  Ask your health care provider for a referral to Quit Plan to help you quit smoking, or call 6-324-696-PLAN.     Colds, Flu, Bronchitis  These are common triggers of asthma. Wash your hands often.  Don t touch your eyes, nose or mouth.  Get a flu shot every year.     Dust Mites  These are tiny bugs that live in cloth or carpet. They are too small to see. Wash sheets and blankets in hot water every week.   Encase pillows and mattress in dust mite proof covers.  Avoid having carpet if you can. If you have carpet, vacuum weekly.   Use a dust mask and HEPA vacuum.   Pollen and Outdoor Mold  Some people are allergic to trees, grass, or weed pollen, or molds. Try to keep your windows closed.  Limit time out doors when pollen count is high.   Ask you health care provider about taking medicine during allergy season.     Animal Dander  Some people are allergic to skin flakes, urine or saliva  from pets with fur or feathers. Keep pets with fur or feathers out of your home.    If you can t keep the pet outdoors, then keep the pet out of your bedroom.  Keep the bedroom door closed.  Keep pets off cloth furniture and away from stuffed toys.     Mice, Rats, and Cockroaches  Some people are allergic to the waste from these pests.   Cover food and garbage.  Clean up spills and food crumbs.  Store grease in the refrigerator.   Keep food out of the bedroom.   Indoor Mold  This can be a trigger if your home has high moisture. Fix leaking faucets, pipes, or other sources of water.   Clean moldy surfaces.  Dehumidify basement if it is damp and smelly.   Smoke, Strong Odors, and Sprays  These can reduce air quality. Stay away from strong odors and sprays, such as perfume, powder, hair spray, paints, smoke incense, paint, cleaning products, candles and new carpet.   Exercise or Sports  Some people with asthma have this trigger. Be active!  Ask your doctor about taking medicine before sports or exercise to prevent symptoms.    Warm up for 5-10 minutes before and after sports or exercise.     Other Triggers of Asthma  Cold air:  Cover your nose and mouth with a scarf.  Sometimes laughing or crying can be a trigger.  Some medicines and food can trigger asthma.

## 2021-01-26 NOTE — ASSESSMENT & PLAN NOTE
This is a chronic health problem that is well controlled with current medications and lifestyle measures. Not on OAC at this time.

## 2021-01-26 NOTE — ASSESSMENT & PLAN NOTE
This is a chronic health problem that is well controlled with current medications and lifestyle measures. Pt states if food related recovers sooner. Last flare up  In 2019, follows with GI  , normally sees every 6 months. Last seen her in 2018. Pt generally manages herself with IBGARD over the counter. Does have IBS overlap.

## 2021-01-27 ASSESSMENT — ANXIETY QUESTIONNAIRES: GAD7 TOTAL SCORE: 0

## 2021-01-27 ASSESSMENT — ASTHMA QUESTIONNAIRES: ACT_TOTALSCORE: 25

## 2021-03-01 ENCOUNTER — E-VISIT (OUTPATIENT)
Dept: URGENT CARE | Facility: URGENT CARE | Age: 61
End: 2021-03-01
Payer: COMMERCIAL

## 2021-03-01 DIAGNOSIS — H01.009 BLEPHARITIS, UNSPECIFIED LATERALITY, UNSPECIFIED TYPE: Primary | ICD-10-CM

## 2021-03-01 PROCEDURE — 99421 OL DIG E/M SVC 5-10 MIN: CPT | Performed by: FAMILY MEDICINE

## 2021-03-01 RX ORDER — ERYTHROMYCIN 5 MG/G
0.5 OINTMENT OPHTHALMIC 4 TIMES DAILY
Qty: 3.5 G | Refills: 0 | Status: SHIPPED | OUTPATIENT
Start: 2021-03-01 | End: 2021-03-08

## 2021-04-22 ENCOUNTER — TELEPHONE (OUTPATIENT)
Dept: FAMILY MEDICINE | Facility: CLINIC | Age: 61
End: 2021-04-22

## 2021-04-22 NOTE — TELEPHONE ENCOUNTER
Patient Quality Outreach      Summary:    Patient has the following on her problem list/HM:     Patient is due/failing the following:   Cervical Cancer Screening - PAP Needed    Type of outreach:    Sent letter.    Questions for provider review:    None                                                                                                                                     Valerie Hightower MA      Chart routed to Care Team.

## 2021-04-22 NOTE — LETTER
April 22, 2021      Carina Chaney  [address]  [place] [address]        Dear Carina,     I care about your health and have reviewed your health plan. I have reviewed your medical conditions, medication list, and lab results and am making recommendations based on this review, to better manage your health.    You are in particular need of attention regarding:  -Cervical Cancer Screening    I am recommending that you:  -schedule a PAP SMEAR EXAM which is due.  Please disregard this reminder if you have had this exam elsewhere within the last year.  It would be helpful for us to have a copy of your recent pap smear report in our file so that we can best coordinate your care.    Here is a list of Health Maintenance topics that are due now or due soon:  Health Maintenance Due   Topic Date Due     Preventive Care Visit  Never done     ANNUAL REVIEW OF HM ORDERS  Never done     Discuss Advance Care Planning  Never done     Mammogram  Never done     Colorectal Cancer Screening  Never done     COVID-19 Vaccine (1) Never done     Hepatitis C Screening  Never done     PAP Smear  Never done     Zoster (Shingles) Vaccine (2 of 2) 03/23/2021       Please call us at [phone] (or use [employer]) to address the above recommendations.     Thank you for trusting [facility] and we appreciate the opportunity to serve you.  We look forward to supporting your healthcare needs in the future.    Suzie Hernandez MD

## 2021-08-26 ENCOUNTER — E-VISIT (OUTPATIENT)
Dept: URGENT CARE | Facility: CLINIC | Age: 61
End: 2021-08-26
Payer: COMMERCIAL

## 2021-08-26 ENCOUNTER — MYC MEDICAL ADVICE (OUTPATIENT)
Dept: FAMILY MEDICINE | Facility: CLINIC | Age: 61
End: 2021-08-26

## 2021-08-26 DIAGNOSIS — R05.9 COUGH: Primary | ICD-10-CM

## 2021-08-26 PROCEDURE — 99421 OL DIG E/M SVC 5-10 MIN: CPT | Performed by: EMERGENCY MEDICINE

## 2021-08-26 NOTE — PATIENT INSTRUCTIONS
"  Dear Carina Chaney      With a new cough Covid testing is necessary which I will order as well.  A  will call you today to set up a time.      After reviewing your responses, I've been able to diagnose you with \"Bronchitis\" which is a common infection of your lungs that is nearly always caused by a virus. The virus causes swelling and irritation of the air passages of your lungs which leads to cough. The illness spreads from your nose and throat to your windpipe and airways. It is often called a \"chest cold\" and can last up to 2 weeks, but is not a serious illness. Exposure to cigarette smoke usually makes this significantly worse.      To treat bronchitis, the main thing to do is drink lots of fluids and rest. Cough medications over-the-counter such as mucinex, robitussin or \"cold and sinus\" medications can be helpful. Ibuprofen and Tylenol also help with fevers or aching feelings that you often have with this kind of illness. Do not take ibuprofen if you have kidney disease, stomach ulcers or allergy to aspirin.     Bronchitis is most often highly contagious as viruses are spread through the air or touch. Avoid contact with others who may become infected, particularly children, the elderly and those whose immune systems might be weak.     If your symptoms worsen, you develop chest pain or shortness of breath, fevers over 101, or are not improving in 5 days, please contact your primary care provider for an appointment or visit any of our convenient Walk-in Care or Urgent Care Centers to be seen which can be found on our website here.    Thanks again for choosing us as your health care partner,    Ulisses Chavez MD  "

## 2021-08-26 NOTE — TELEPHONE ENCOUNTER
Called patient about 230pm. Very short of breath but talks in full sentences. Have directed patient to go in to urgent care. She will go to the [facility].  MAHESH Chavez MD      Updated Covid vaccine information.     Routing to  provider; had requested documentation of negative covid test. See attached picture in mychart message.     Maryan Etienne RN

## 2021-08-27 ENCOUNTER — HOSPITAL ENCOUNTER (EMERGENCY)
Facility: CLINIC | Age: 61
Discharge: HOME | End: 2021-08-27
Attending: EMERGENCY MEDICINE | Admitting: EMERGENCY MEDICINE
Payer: COMMERCIAL

## 2021-08-27 ENCOUNTER — APPOINTMENT (OUTPATIENT)
Dept: GENERAL RADIOLOGY | Facility: CLINIC | Age: 61
End: 2021-08-27
Attending: EMERGENCY MEDICINE
Payer: COMMERCIAL

## 2021-08-27 VITALS
SYSTOLIC BLOOD PRESSURE: 127 MMHG | BODY MASS INDEX: 23.78 KG/M2 | HEIGHT: 66 IN | HEART RATE: 76 BPM | TEMPERATURE: 97.4 F | DIASTOLIC BLOOD PRESSURE: 86 MMHG | WEIGHT: 148 LBS | RESPIRATION RATE: 18 BRPM | OXYGEN SATURATION: 95 %

## 2021-08-27 DIAGNOSIS — E87.6 HYPOKALEMIA: ICD-10-CM

## 2021-08-27 DIAGNOSIS — R05.9 COUGH: ICD-10-CM

## 2021-08-27 DIAGNOSIS — J06.9 UPPER RESPIRATORY TRACT INFECTION, UNSPECIFIED TYPE: ICD-10-CM

## 2021-08-27 LAB
ALBUMIN SERPL-MCNC: 3.8 G/DL (ref 3.4–5)
ALP SERPL-CCNC: 83 U/L (ref 40–150)
ALT SERPL W P-5'-P-CCNC: 19 U/L (ref 0–50)
ANION GAP SERPL CALCULATED.3IONS-SCNC: 4 MMOL/L (ref 3–14)
AST SERPL W P-5'-P-CCNC: 19 U/L (ref 0–45)
ATRIAL RATE - MUSE: 68 BPM
BASOPHILS # BLD AUTO: 0 10E3/UL (ref 0–0.2)
BASOPHILS NFR BLD AUTO: 1 %
BILIRUB SERPL-MCNC: 0.4 MG/DL (ref 0.2–1.3)
BUN SERPL-MCNC: 14 MG/DL (ref 7–30)
CALCIUM SERPL-MCNC: 9 MG/DL (ref 8.5–10.1)
CHLORIDE BLD-SCNC: 107 MMOL/L (ref 94–109)
CO2 SERPL-SCNC: 28 MMOL/L (ref 20–32)
CREAT SERPL-MCNC: 0.65 MG/DL (ref 0.52–1.04)
DIASTOLIC BLOOD PRESSURE - MUSE: NORMAL MMHG
EOSINOPHIL # BLD AUTO: 0.2 10E3/UL (ref 0–0.7)
EOSINOPHIL NFR BLD AUTO: 2 %
ERYTHROCYTE [DISTWIDTH] IN BLOOD BY AUTOMATED COUNT: 12 % (ref 10–15)
GFR SERPL CREATININE-BSD FRML MDRD: >90 ML/MIN/1.73M2
GLUCOSE BLD-MCNC: 101 MG/DL (ref 70–99)
HCT VFR BLD AUTO: 40.9 % (ref 35–47)
HGB BLD-MCNC: 13.4 G/DL (ref 11.7–15.7)
IMM GRANULOCYTES # BLD: 0 10E3/UL
IMM GRANULOCYTES NFR BLD: 0 %
INTERPRETATION ECG - MUSE: NORMAL
LYMPHOCYTES # BLD AUTO: 1.5 10E3/UL (ref 0.8–5.3)
LYMPHOCYTES NFR BLD AUTO: 18 %
MCH RBC QN AUTO: 30.5 PG (ref 26.5–33)
MCHC RBC AUTO-ENTMCNC: 32.8 G/DL (ref 31.5–36.5)
MCV RBC AUTO: 93 FL (ref 78–100)
MONOCYTES # BLD AUTO: 1.1 10E3/UL (ref 0–1.3)
MONOCYTES NFR BLD AUTO: 14 %
NEUTROPHILS # BLD AUTO: 5.3 10E3/UL (ref 1.6–8.3)
NEUTROPHILS NFR BLD AUTO: 65 %
NRBC # BLD AUTO: 0 10E3/UL
NRBC BLD AUTO-RTO: 0 /100
P AXIS - MUSE: 30 DEGREES
PLATELET # BLD AUTO: 278 10E3/UL (ref 150–450)
POTASSIUM BLD-SCNC: 3.3 MMOL/L (ref 3.4–5.3)
PR INTERVAL - MUSE: 118 MS
PROT SERPL-MCNC: 7.4 G/DL (ref 6.8–8.8)
QRS DURATION - MUSE: 84 MS
QT - MUSE: 380 MS
QTC - MUSE: 404 MS
R AXIS - MUSE: 23 DEGREES
RBC # BLD AUTO: 4.4 10E6/UL (ref 3.8–5.2)
SARS-COV-2 RNA RESP QL NAA+PROBE: NEGATIVE
SODIUM SERPL-SCNC: 139 MMOL/L (ref 133–144)
SYSTOLIC BLOOD PRESSURE - MUSE: NORMAL MMHG
T AXIS - MUSE: 55 DEGREES
TROPONIN I SERPL-MCNC: <0.015 UG/L (ref 0–0.04)
VENTRICULAR RATE- MUSE: 68 BPM
WBC # BLD AUTO: 8.1 10E3/UL (ref 4–11)

## 2021-08-27 PROCEDURE — 250N000009 HC RX 250: Performed by: EMERGENCY MEDICINE

## 2021-08-27 PROCEDURE — 250N000013 HC RX MED GY IP 250 OP 250 PS 637: Performed by: EMERGENCY MEDICINE

## 2021-08-27 PROCEDURE — 93005 ELECTROCARDIOGRAM TRACING: CPT

## 2021-08-27 PROCEDURE — 36415 COLL VENOUS BLD VENIPUNCTURE: CPT | Performed by: EMERGENCY MEDICINE

## 2021-08-27 PROCEDURE — 94640 AIRWAY INHALATION TREATMENT: CPT

## 2021-08-27 PROCEDURE — 87635 SARS-COV-2 COVID-19 AMP PRB: CPT | Performed by: EMERGENCY MEDICINE

## 2021-08-27 PROCEDURE — 84484 ASSAY OF TROPONIN QUANT: CPT | Performed by: EMERGENCY MEDICINE

## 2021-08-27 PROCEDURE — 82040 ASSAY OF SERUM ALBUMIN: CPT | Performed by: EMERGENCY MEDICINE

## 2021-08-27 PROCEDURE — 85025 COMPLETE CBC W/AUTO DIFF WBC: CPT | Performed by: EMERGENCY MEDICINE

## 2021-08-27 PROCEDURE — 99285 EMERGENCY DEPT VISIT HI MDM: CPT | Mod: 25

## 2021-08-27 PROCEDURE — C9803 HOPD COVID-19 SPEC COLLECT: HCPCS

## 2021-08-27 PROCEDURE — 71045 X-RAY EXAM CHEST 1 VIEW: CPT

## 2021-08-27 RX ORDER — POTASSIUM CHLORIDE 1.5 G/1.58G
40 POWDER, FOR SOLUTION ORAL ONCE
Status: COMPLETED | OUTPATIENT
Start: 2021-08-27 | End: 2021-08-27

## 2021-08-27 RX ORDER — IPRATROPIUM BROMIDE AND ALBUTEROL SULFATE 2.5; .5 MG/3ML; MG/3ML
3 SOLUTION RESPIRATORY (INHALATION) ONCE
Status: COMPLETED | OUTPATIENT
Start: 2021-08-27 | End: 2021-08-27

## 2021-08-27 RX ADMIN — IPRATROPIUM BROMIDE AND ALBUTEROL SULFATE 3 ML: .5; 3 SOLUTION RESPIRATORY (INHALATION) at 10:03

## 2021-08-27 RX ADMIN — POTASSIUM CHLORIDE 40 MEQ: 1.5 POWDER, FOR SOLUTION ORAL at 10:44

## 2021-08-27 ASSESSMENT — MIFFLIN-ST. JEOR: SCORE: 1263.07

## 2021-08-27 NOTE — ED PROVIDER NOTES
History   Chief Complaint:  Cough and Shortness of Breath     The history is provided by the patient.      Carina Chaney is a 61 year old female marijuana smoker who presents with cough and shortness of breath along with congestion and a slight sore throat that have been present for the past 3 days.  Yesterday morning she had a 5-minute virtual evaluation yielding a diagnosis of bronchitis, and she was subsequently seen in an ED in urgent care yesterday afternoon and was placed on azithromycin yesterday as well as amoxicillin, tessalon, albuterol inhalers and nebs and at  was placed on prednisone. She is fully vaccinated with Pfizer as of 04/06/2021.  She states that she does not tolerate high doses of prednisone and has been on 10 mg a day starting yesterday, and has not had any today yet.  She took her nebulizer this morning but felt a little shaky right after.  She does not smoke tobacco.  She is a history of a provoked clot in her right arm several years ago but no history of PE.  Today she felt generally weak and called an ambulance to bring her to the hospital.  No syncope.  No vomiting.  She states that her Crohn's disease is in remission and she is not on medication for it.  She has never been admitted to the hospital for breathing issues as an adult.  She is vaccinated for Covid and had a negative Covid test several days ago.    Review of Systems   All other systems reviewed and are negative.      Allergies:  Hydromorphone  Morphine  Alcohol  Ciprofloxacin  Codeine  Prednisone  Sodium Phosphate    Medications:  Albuterol inhaler   Albuterol duoneb   Prednisone    Amoxicillin   Tessalon     Past Medical History:    Colitis   Crohn disease of rectum with fistula   Marijuana use   IBS   Rhinitis   Reactive airway disease  Rectal fissures   Chronic DVT of brachial vein   Thrombophlebitis of upper extremity    Past Surgical History:    Tonsillectomy   Adenoidectomy   Colonoscopy x2  Refractive surgery  "  Hemorrhoid internal thrombosed I & D    Family History:    GI disease, brother   Hypertension, father   Alzheimer's disease, mother     Social History:  Smoking status: former smoker  She is a   PCP: Suzie Hernandez  Presents to the ED alone    Physical Exam     Patient Vitals for the past 24 hrs:   BP Temp Temp src Pulse Resp SpO2 Height Weight   08/27/21 1100 (!) 159/81 -- -- 71 -- 98 % -- --   08/27/21 1045 127/61 -- -- 92 -- 100 % -- --   08/27/21 1020 -- -- -- -- -- 100 % -- --   08/27/21 0830 137/77 97.4  F (36.3  C) Temporal 99 18 99 % 1.676 m (5' 6\") 67.1 kg (148 lb)       Physical Exam  Gen: Nontoxic appearing woman sitting upright in room 23  HENT: mucous membranes moist, mastoids nontender, OP clear without swelling or exudate  Eyes: pupils normal, no scleral injection  CV: regular rhythm, cap refill normal, no LE edema, no murmur audible  Resp: normal effort, speaks in full phrases, no stridor, occasional dry cough observed, clear throughout, no wheezing  GI: abdomen soft and nontender, no guarding  MSK: no bony tenderness  Skin: appropriately warm and dry, no ecchymosis, no petechiae  Neuro: awake, alert, normal tone in extremities, no meningismus  Psych: anxious, cooperative    Emergency Department Course   ECG:  ECG taken at 1030, ECG read at 1037  Normal sinus rhythm  Changes noted above  Rate 68 bpm. MA interval 118 ms. QRS duration 84 ms. QT/QTc 380/404 ms. P-R-T axes 30 23 55.    Imaging:  Chest  XR PORT:  Normal.    Reading per radiology    Laboratory:  CBC: WBC 8.1, HGB 13.4,    CMP: Glucose 101(H), Potassium 3.3(L), o/w WNL (Creatinine: 0.65)    Troponin (Collected 1001): <0.015   Symptomatic COVID-19 PCR: negative     Emergency Department Course:  Reviewed:  I reviewed the patient's nursing notes, vitals, past medical records, Care Everywhere.     Assessments:  0927 I performed an assessment and examination of the patient as noted above.      1128 Findings and plan " explained to the Patient by phone call to their room. Patient discharged home with instructions regarding supportive care, medications, and reasons to return. The importance of close follow-up was reviewed.     Interventions:  1003 Duoneb, 3 mL, nebulization    1044 Potassium chloride, 40 mEq, PO    Disposition:  The patient was discharged to home.       Impression & Plan   Medical Decision Making:  I think it is most likely that she is experiencing a viral respiratory infection.  Covid was considered though she is vaccinated and now has 2 - tests.  Work of breathing is normal at this time.  She was started on a low-dose of steroids and antibiotics yesterday for possible bacterial pneumonia, though her vital signs, exam, and x-ray do not give compelling evidence for the presence of a serious lobar pneumonia.  Gradual progression of symptoms in combination with a variety of other symptoms highly suspicious for infection rather than pulmonary embolism.  No evidence of acute cardiac condition.  She was treated symptomatically.  She is tolerating oral intake.  I do not think that hospitalization is indicated at this time, noting that her oxygen saturation has been in the high 90s to 100% on room air.  She has bronchodilators available to her at home.  She reports intolerance of higher doses of steroids, and does not have evidence of significant bronchospasm at this time.  Reassurance was provided while discussing outpatient follow-up and return precautions.  She was discharged home after questions answered.       Covid-19  Carina Chaney was evaluated during a global COVID-19 pandemic, which necessitated consideration that the patient might be at risk for infection with the SARS-CoV-2 virus that causes COVID-19.   Applicable protocols for evaluation were followed during the patient's care. COVID-19 was considered as part of the patient's evaluation. The plan for testing is:  a test was obtained during this  visit.    Diagnosis:    ICD-10-CM    1. Cough  R05    2. Upper respiratory tract infection, unspecified type  J06.9    3. Hypokalemia  E87.6        Scribe Disclosure:  I, Phyllis Verma, am serving as a scribe at 9:26 AM on 8/27/2021 to document services personally performed by Edilson Arellano MD based on my observations and the provider's statements to me.     This note was completed in part using Dragon voice recognition software. Although reviewed after completion, some word and grammatical errors may occur.        Edilson Arellano MD  08/27/21 2374

## 2021-08-27 NOTE — ED TRIAGE NOTES
Arrives via EMS from home with cough and SOB. Dx with pneumonia and bronchitis yesterday and started on antibiotics. Covid was negative yesterday. Symptoms started on Tuesday 8/24. Arrives at 99% SpO2 on room air but tachycardic to 120

## 2021-09-03 ENCOUNTER — OFFICE VISIT (OUTPATIENT)
Dept: FAMILY MEDICINE | Facility: CLINIC | Age: 61
End: 2021-09-03
Payer: COMMERCIAL

## 2021-09-03 VITALS
OXYGEN SATURATION: 99 % | BODY MASS INDEX: 23.24 KG/M2 | TEMPERATURE: 96.3 F | SYSTOLIC BLOOD PRESSURE: 122 MMHG | DIASTOLIC BLOOD PRESSURE: 62 MMHG | WEIGHT: 144 LBS | RESPIRATION RATE: 16 BRPM | HEART RATE: 89 BPM

## 2021-09-03 DIAGNOSIS — J40 BRONCHITIS: Primary | ICD-10-CM

## 2021-09-03 PROCEDURE — 99213 OFFICE O/P EST LOW 20 MIN: CPT | Performed by: INTERNAL MEDICINE

## 2021-09-03 ASSESSMENT — PAIN SCALES - GENERAL: PAINLEVEL: NO PAIN (0)

## 2021-09-03 NOTE — PROGRESS NOTES
Assessment & Plan     Bronchitis  She has completed course of antibiotics and prednisone, feeling much better and her exam is normal.  Continue over-the-counter symptom relief as needed.        Return in about 2 months (around 11/3/2021) for Physical Exam.    Carla Dobson MD  [facility] CARLENE Lim is a 61 year old who presents for the following health issues     HPI     ED/UC Followup:    Facility:  [facility]   Date of visit: 8/27/2021   Reason for visit: cough   Current Status: improved      Carina is here for ED recommendations for follow-up.  She was seen in urgent care with a cough and diagnosed with bronchitis versus pneumonia.  She was given a course of prednisone, azithromycin, and amoxicillin as well as benzonatate as a cough suppressant.  She was seen again in the ER yesterday for continued symptoms, and advised to continue her current regimen.  She is thankfully feeling quite a bit better.  The cough is improving and the mucus/drainage is lessening.  She has completed the course of prednisone and antibiotics.  The Tessalon has been very helpful as a cough suppressant.  She is also using N-acetylcysteine and a Chinese herbal supplement [place] which is also very helpful.  Her energy and appetite are good.          Review of Systems   Const, heent, pulm, gi reviewed,  otherwise negative unless noted above.        Objective    /62   Pulse 89   Temp (!) 96.3  F (35.7  C) (Tympanic)   Resp 16   Wt 65.3 kg (144 lb)   LMP  (LMP Unknown)   SpO2 99%   BMI 23.24 kg/m    Body mass index is 23.24 kg/m .  Physical Exam   Gen: well appearing, pleasant woman, no distress  HEENT: PERRL, sclera nonicteric, MMM  Neck: supple, no LAD  Pulm: breathing comfortably, CTAB, no wheezes or rales  CV: RRR, normal S1 and S2, no murmurs  Ext: 2+ distal pulses, no LE edema

## 2021-09-04 ENCOUNTER — HEALTH MAINTENANCE LETTER (OUTPATIENT)
Age: 61
End: 2021-09-04

## 2021-10-30 ENCOUNTER — HEALTH MAINTENANCE LETTER (OUTPATIENT)
Age: 61
End: 2021-10-30

## 2021-12-17 ENCOUNTER — OFFICE VISIT (OUTPATIENT)
Dept: FAMILY MEDICINE | Facility: CLINIC | Age: 61
End: 2021-12-17
Payer: COMMERCIAL

## 2021-12-17 VITALS
SYSTOLIC BLOOD PRESSURE: 112 MMHG | TEMPERATURE: 97.7 F | DIASTOLIC BLOOD PRESSURE: 66 MMHG | RESPIRATION RATE: 16 BRPM | OXYGEN SATURATION: 96 % | BODY MASS INDEX: 23.95 KG/M2 | WEIGHT: 148.4 LBS | HEART RATE: 85 BPM

## 2021-12-17 DIAGNOSIS — E87.6 HYPOKALEMIA: ICD-10-CM

## 2021-12-17 DIAGNOSIS — Z00.00 ROUTINE GENERAL MEDICAL EXAMINATION AT A HEALTH CARE FACILITY: Primary | ICD-10-CM

## 2021-12-17 DIAGNOSIS — L30.9 ECZEMA, UNSPECIFIED TYPE: ICD-10-CM

## 2021-12-17 DIAGNOSIS — Z23 NEED FOR VACCINATION: ICD-10-CM

## 2021-12-17 DIAGNOSIS — M85.80 AGE-RELATED BONE LOSS: ICD-10-CM

## 2021-12-17 DIAGNOSIS — K50.819 CROHN'S DISEASE OF SMALL AND LARGE INTESTINES WITH COMPLICATION (H): ICD-10-CM

## 2021-12-17 DIAGNOSIS — Z13.6 ENCOUNTER FOR LIPID SCREENING FOR CARDIOVASCULAR DISEASE: ICD-10-CM

## 2021-12-17 DIAGNOSIS — R53.83 FATIGUE, UNSPECIFIED TYPE: ICD-10-CM

## 2021-12-17 DIAGNOSIS — Z12.31 ENCOUNTER FOR SCREENING MAMMOGRAM FOR BREAST CANCER: ICD-10-CM

## 2021-12-17 DIAGNOSIS — Z13.220 ENCOUNTER FOR LIPID SCREENING FOR CARDIOVASCULAR DISEASE: ICD-10-CM

## 2021-12-17 DIAGNOSIS — K58.9 IRRITABLE BOWEL SYNDROME, UNSPECIFIED TYPE: ICD-10-CM

## 2021-12-17 PROCEDURE — 99213 OFFICE O/P EST LOW 20 MIN: CPT | Mod: 25 | Performed by: INTERNAL MEDICINE

## 2021-12-17 PROCEDURE — 90471 IMMUNIZATION ADMIN: CPT | Performed by: INTERNAL MEDICINE

## 2021-12-17 PROCEDURE — 99396 PREV VISIT EST AGE 40-64: CPT | Mod: 25 | Performed by: INTERNAL MEDICINE

## 2021-12-17 PROCEDURE — 90750 HZV VACC RECOMBINANT IM: CPT | Performed by: INTERNAL MEDICINE

## 2021-12-17 RX ORDER — IPRATROPIUM BROMIDE AND ALBUTEROL SULFATE 2.5; .5 MG/3ML; MG/3ML
SOLUTION RESPIRATORY (INHALATION)
COMMUNITY
Start: 2021-08-26 | End: 2022-04-15

## 2021-12-17 RX ORDER — CLOBETASOL PROPIONATE 0.5 MG/G
CREAM TOPICAL
Qty: 45 G | Refills: 1 | Status: SHIPPED | OUTPATIENT
Start: 2021-12-17 | End: 2021-12-22

## 2021-12-17 ASSESSMENT — ENCOUNTER SYMPTOMS
HEMATURIA: 0
SHORTNESS OF BREATH: 0
CHILLS: 0
FEVER: 0
COUGH: 0
PALPITATIONS: 0
NERVOUS/ANXIOUS: 0
DIARRHEA: 0
HEARTBURN: 0
DYSURIA: 0
ARTHRALGIAS: 0
BREAST MASS: 0
DIZZINESS: 0
NAUSEA: 0
ABDOMINAL PAIN: 0
HEADACHES: 0
JOINT SWELLING: 0
FREQUENCY: 0
EYE PAIN: 0
CONSTIPATION: 0
MYALGIAS: 0
PARESTHESIAS: 0
WEAKNESS: 0
SORE THROAT: 0
HEMATOCHEZIA: 0

## 2021-12-17 ASSESSMENT — PAIN SCALES - GENERAL: PAINLEVEL: NO PAIN (0)

## 2021-12-17 NOTE — PATIENT INSTRUCTIONS
As discussed, please do fasting LAB only appointment.     Will decide on PAP smear once I receive the records from your OBGYN    Medication for Eczema sent your pharmacy.     =====================      Preventive Health Recommendations  Female Ages 50 - 64    Yearly exam: See your health care provider every year in order to  o Review health changes.   o Discuss preventive care.    o Review your medicines if your doctor has prescribed any.      Get a Pap test every three years (unless you have an abnormal result and your provider advises testing more often).    If you get Pap tests with HPV test, you only need to test every 5 years, unless you have an abnormal result.     You do not need a Pap test if your uterus was removed (hysterectomy) and you have not had cancer.    You should be tested each year for STDs (sexually transmitted diseases) if you're at risk.     Have a mammogram every 1 to 2 years.    Have a colonoscopy at age 50, or have a yearly FIT test (stool test). These exams screen for colon cancer.      Have a cholesterol test every 5 years, or more often if advised.    Have a diabetes test (fasting glucose) every three years. If you are at risk for diabetes, you should have this test more often.     If you are at risk for osteoporosis (brittle bone disease), think about having a bone density scan (DEXA).    Shots: Get a flu shot each year. Get a tetanus shot every 10 years.    Nutrition:     Eat at least 5 servings of fruits and vegetables each day.    Eat whole-grain bread, whole-wheat pasta and brown rice instead of white grains and rice.    Get adequate Calcium and Vitamin D.     Lifestyle    Exercise at least 150 minutes a week (30 minutes a day, 5 days a week). This will help you control your weight and prevent disease.    Limit alcohol to one drink per day.    No smoking.     Wear sunscreen to prevent skin cancer.     See your dentist every six months for an exam and cleaning.    See your eye doctor  every 1 to 2 years.

## 2021-12-17 NOTE — PROGRESS NOTES
SUBJECTIVE:   CC: Carina Chaney is an 61 year old woman who presents for preventive health visit.       Patient has been advised of split billing requirements and indicates understanding: Yes  Healthy Habits:     Getting at least 3 servings of Calcium per day:  Yes    Bi-annual eye exam:  NO    Dental care twice a year:  Yes    Sleep apnea or symptoms of sleep apnea:  None    Diet:  Regular (no restrictions)    Frequency of exercise:  4-5 days/week    Duration of exercise:  15-30 minutes    Taking medications regularly:  Yes    Medication side effects:  Not applicable    PHQ-2 Total Score: 0    Additional concerns today:  No      Today's PHQ-2 Score:   PHQ-2 ( 1999 Pfizer) 12/17/2021   Q1: Little interest or pleasure in doing things 0   Q2: Feeling down, depressed or hopeless 0   PHQ-2 Score 0   PHQ-2 Total Score (12-17 Years)- Positive if 3 or more points; Administer PHQ-A if positive -   Q1: Little interest or pleasure in doing things Not at all   Q2: Feeling down, depressed or hopeless Not at all   PHQ-2 Score 0       Abuse: Current or Past (Physical, Sexual or Emotional) - no  Do you feel safe in your environment? Yes    Have you ever done Advance Care Planning? (For example, a Health Directive, POLST, or a discussion with a medical provider or your loved ones about your wishes): No, advance care planning information given to patient to review.  Patient plans to discuss their wishes with loved ones or provider.      Social History     Tobacco Use     Smoking status: Never Smoker     Smokeless tobacco: Never Used   Substance Use Topics     Alcohol use: Yes     Comment: Very little, less than a glass of wine per month     If you drink alcohol do you typically have >3 drinks per day or >7 drinks per week? No    Alcohol Use 12/17/2021   Prescreen: >3 drinks/day or >7 drinks/week? No   Prescreen: >3 drinks/day or >7 drinks/week? -       Reviewed orders with patient.  Reviewed health maintenance and updated orders  accordingly - Yes  Lab work is in process  Labs reviewed in EPIC    Breast Cancer Screening:    Breast CA Risk Assessment (FHS-7) 12/17/2021   Do you have a family history of breast, colon, or ovarian cancer? No / Unknown       click delete button to remove this line now  Mammogram Screening: Recommended mammography every 1-2 years with patient discussion and risk factor consideration  Pertinent mammograms are reviewed under the imaging tab.    History of abnormal Pap smear: NO - age 30-65 PAP every 5 years with negative HPV co-testing recommended     Reviewed and updated as needed this visit by clinical staff  Tobacco  Allergies  Meds  Problems  Med Hx  Surg Hx  Fam Hx  Soc Hx         Reviewed and updated as needed this visit by Provider  Tobacco  Allergies  Meds  Problems  Med Hx  Surg Hx  Fam Hx        Past Medical History:   Diagnosis Date     Colitis      Crohn disease (H)       Past Surgical History:   Procedure Laterality Date     ENT SURGERY       GYN SURGERY       OB History   No obstetric history on file.       Review of Systems   Constitutional: Negative for chills and fever.   HENT: Negative for congestion, ear pain, hearing loss and sore throat.    Eyes: Negative for pain and visual disturbance.   Respiratory: Negative for cough and shortness of breath.    Cardiovascular: Negative for chest pain, palpitations and peripheral edema.   Gastrointestinal: Negative for abdominal pain, constipation, diarrhea, heartburn, hematochezia and nausea.   Breasts:  Negative for tenderness, breast mass and discharge.   Genitourinary: Negative for dysuria, frequency, genital sores, hematuria, pelvic pain, urgency, vaginal bleeding and vaginal discharge.   Musculoskeletal: Negative for arthralgias, joint swelling and myalgias.   Skin: Positive for rash.   Neurological: Negative for dizziness, weakness, headaches and paresthesias.   Psychiatric/Behavioral: Negative for mood changes. The patient is not  nervous/anxious.      CONSTITUTIONAL: NEGATIVE for fever, chills, change in weight  INTEGUMENTARY/SKIN: NEGATIVE for worrisome rashes, moles or lesions  EYES: NEGATIVE for vision changes or irritation  ENT: NEGATIVE for ear, mouth and throat problems  RESP: NEGATIVE for significant cough or SOB  BREAST: NEGATIVE for masses, tenderness or discharge  CV: NEGATIVE for chest pain, palpitations or peripheral edema  GI: NEGATIVE for nausea, abdominal pain, heartburn, or change in bowel habits  : NEGATIVE for unusual urinary or vaginal symptoms. No vaginal bleeding.  MUSCULOSKELETAL: NEGATIVE for significant arthralgias or myalgia  NEURO: NEGATIVE for weakness, dizziness or paresthesias  PSYCHIATRIC: NEGATIVE for changes in mood or affect      OBJECTIVE:   /66 (Cuff Size: Adult Regular)   Pulse 85   Temp 97.7  F (36.5  C) (Tympanic)   Resp 16   Wt 67.3 kg (148 lb 6.4 oz)   LMP  (LMP Unknown)   SpO2 96%   BMI 23.95 kg/m    Physical Exam  GENERAL APPEARANCE: healthy, alert and no distress  EYES: Eyes grossly normal to inspection, PERRL and conjunctivae and sclerae normal  HENT: ear canals and TM's normal, nose and mouth without ulcers or lesions, oropharynx clear and oral mucous membranes moist  NECK: no adenopathy, no asymmetry, masses, or scars and thyroid normal to palpation  RESP: lungs clear to auscultation - no rales, rhonchi or wheezes  BREAST: normal without masses, tenderness or nipple discharge and no palpable axillary masses or adenopathy  CV: regular rate and rhythm, normal S1 S2, no S3 or S4, no murmur, click or rub, no peripheral edema and peripheral pulses strong  ABDOMEN: soft, nontender, no hepatosplenomegaly, no masses and bowel sounds normal  MS: no musculoskeletal defects are noted and gait is age appropriate without ataxia  SKIN: no suspicious lesions or rashes  NEURO: Normal strength and tone, sensory exam grossly normal, mentation intact and speech normal  PSYCH: mentation appears normal  and affect normal/bright    Diagnostic Test Results:  Labs reviewed in Epic    ASSESSMENT/PLAN:       Assessment and Plan    1. Routine general medical examination at a health care facility  Last seen pt in 1/2021. She is here for physical. Last PAP in 2019 as per the pt at [facility] , which is ?god for 5 yrs. Will get records before planning for a follow up PAP. JIM signed today. Last labs in 8/2021 reviewed, positive for hypokalemia. She does have ongoing fatigue which is new, will check for thyroid function.   - REVIEW OF HEALTH MAINTENANCE PROTOCOL ORDERS  - ipratropium - albuterol 0.5 mg/2.5 mg/3 mL (DUONEB) 0.5-2.5 (3) MG/3ML neb solution; INHALE 3 ML VIA A NEBULIZER EVERY 6 HOURS IF NEEDED.  - Lipid panel reflex to direct LDL Fasting; Future  - Basic metabolic panel  (Ca, Cl, CO2, Creat, Gluc, K, Na, BUN); Future  - clobetasol (TEMOVATE) 0.05 % external cream; Apply to the affected Eczema patch twice a day for 7 days.  Dispense: 45 g; Refill: 1  - Vitamin D Deficiency; Future  - TSH with free T4 reflex; Future  - ZOSTER VACCINE RECOMBINANT ADJUVANTED IM NJX  - MA Screen Bilateral w/Josiah; Future    2. Irritable bowel syndrome, unspecified type  3. Crohn's disease of small and large intestines with complication (H)  Well controlled on current diet and lifestyle modifications     4. Encounter for lipid screening for cardiovascular disease  - Lipid panel reflex to direct LDL Fasting; Future    5. Hypokalemia  - Basic metabolic panel  (Ca, Cl, CO2, Creat, Gluc, K, Na, BUN); Future    6. Eczema, unspecified type  New problem on the dorsal aspect of both wrists , failed over the counter creams. Will prescribe Clobetasol for improvement.   - clobetasol (TEMOVATE) 0.05 % external cream; Apply to the affected Eczema patch twice a day for 7 days.  Dispense: 45 g; Refill: 1    7. Age-related bone loss  - Vitamin D Deficiency; Future    8. Fatigue, unspecified type  - TSH with free T4 reflex; Future    9. Need  for vaccination  - ZOSTER VACCINE RECOMBINANT ADJUVANTED IM NJX    10. Encounter for screening mammogram for breast cancer  - MA Screen Bilateral w/Josiah; Future         Patient Instructions   As discussed, please do fasting LAB only appointment.     Will decide on PAP smear once I receive the records from your OBGYN    Medication for Eczema sent your pharmacy.     =====================      Preventive Health Recommendations  Female Ages 50 - 64    Yearly exam: See your health care provider every year in order to  o Review health changes.   o Discuss preventive care.    o Review your medicines if your doctor has prescribed any.      Get a Pap test every three years (unless you have an abnormal result and your provider advises testing more often).    If you get Pap tests with HPV test, you only need to test every 5 years, unless you have an abnormal result.     You do not need a Pap test if your uterus was removed (hysterectomy) and you have not had cancer.    You should be tested each year for STDs (sexually transmitted diseases) if you're at risk.     Have a mammogram every 1 to 2 years.    Have a colonoscopy at age 50, or have a yearly FIT test (stool test). These exams screen for colon cancer.      Have a cholesterol test every 5 years, or more often if advised.    Have a diabetes test (fasting glucose) every three years. If you are at risk for diabetes, you should have this test more often.     If you are at risk for osteoporosis (brittle bone disease), think about having a bone density scan (DEXA).    Shots: Get a flu shot each year. Get a tetanus shot every 10 years.    Nutrition:     Eat at least 5 servings of fruits and vegetables each day.    Eat whole-grain bread, whole-wheat pasta and brown rice instead of white grains and rice.    Get adequate Calcium and Vitamin D.     Lifestyle    Exercise at least 150 minutes a week (30 minutes a day, 5 days a week). This will help you control your weight and prevent  "disease.    Limit alcohol to one drink per day.    No smoking.     Wear sunscreen to prevent skin cancer.     See your dentist every six months for an exam and cleaning.    See your eye doctor every 1 to 2 years.      Return in about 6 months (around 6/17/2022), or if symptoms worsen or fail to improve, for Follow up of last visit.    Suzie Hernandez MD  [facility]        Patient has been advised of split billing requirements and indicates understanding: Yes  COUNSELING:  Reviewed preventive health counseling, as reflected in patient instructions  Special attention given to:        Regular exercise       Healthy diet/nutrition       Vision screening       Hearing screening       Immunizations    Vaccinated for: Zoster             Osteoporosis prevention/bone health       (Gina)menopause management    Estimated body mass index is 23.95 kg/m  as calculated from the following:    Height as of 8/27/21: 1.676 m (5' 6\").    Weight as of this encounter: 67.3 kg (148 lb 6.4 oz).        She reports that she has never smoked. She has never used smokeless tobacco.      Counseling Resources:  ATP IV Guidelines  Pooled Cohorts Equation Calculator  Breast Cancer Risk Calculator  BRCA-Related Cancer Risk Assessment: FHS-7 Tool  FRAX Risk Assessment  ICSI Preventive Guidelines  Dietary Guidelines for Americans, 2010  USDA's MyPlate  ASA Prophylaxis  Lung CA Screening    Suzie Hernandez MD  [facility] [place]  "

## 2021-12-18 ASSESSMENT — ASTHMA QUESTIONNAIRES: ACT_TOTALSCORE: 25

## 2021-12-24 ENCOUNTER — LAB (OUTPATIENT)
Dept: LAB | Facility: CLINIC | Age: 61
End: 2021-12-24
Payer: COMMERCIAL

## 2021-12-24 DIAGNOSIS — M85.80 AGE-RELATED BONE LOSS: ICD-10-CM

## 2021-12-24 DIAGNOSIS — Z13.6 ENCOUNTER FOR LIPID SCREENING FOR CARDIOVASCULAR DISEASE: ICD-10-CM

## 2021-12-24 DIAGNOSIS — R53.83 FATIGUE, UNSPECIFIED TYPE: ICD-10-CM

## 2021-12-24 DIAGNOSIS — E87.6 HYPOKALEMIA: ICD-10-CM

## 2021-12-24 DIAGNOSIS — Z00.00 ROUTINE GENERAL MEDICAL EXAMINATION AT A HEALTH CARE FACILITY: ICD-10-CM

## 2021-12-24 DIAGNOSIS — Z13.220 ENCOUNTER FOR LIPID SCREENING FOR CARDIOVASCULAR DISEASE: ICD-10-CM

## 2021-12-24 LAB
ANION GAP SERPL CALCULATED.3IONS-SCNC: 4 MMOL/L (ref 3–14)
BUN SERPL-MCNC: 13 MG/DL (ref 7–30)
CALCIUM SERPL-MCNC: 9 MG/DL (ref 8.5–10.1)
CHLORIDE BLD-SCNC: 107 MMOL/L (ref 94–109)
CHOLEST SERPL-MCNC: 155 MG/DL
CO2 SERPL-SCNC: 29 MMOL/L (ref 20–32)
CREAT SERPL-MCNC: 0.63 MG/DL (ref 0.52–1.04)
DEPRECATED CALCIDIOL+CALCIFEROL SERPL-MC: 35 UG/L (ref 20–75)
FASTING STATUS PATIENT QL REPORTED: YES
GFR SERPL CREATININE-BSD FRML MDRD: >90 ML/MIN/1.73M2
GLUCOSE BLD-MCNC: 102 MG/DL (ref 70–99)
HDLC SERPL-MCNC: 55 MG/DL
LDLC SERPL CALC-MCNC: 86 MG/DL
NONHDLC SERPL-MCNC: 100 MG/DL
POTASSIUM BLD-SCNC: 3.9 MMOL/L (ref 3.4–5.3)
SODIUM SERPL-SCNC: 140 MMOL/L (ref 133–144)
TRIGL SERPL-MCNC: 71 MG/DL
TSH SERPL DL<=0.005 MIU/L-ACNC: 1.09 MU/L (ref 0.4–4)

## 2021-12-24 PROCEDURE — 36415 COLL VENOUS BLD VENIPUNCTURE: CPT

## 2021-12-24 PROCEDURE — 84443 ASSAY THYROID STIM HORMONE: CPT

## 2021-12-24 PROCEDURE — 82306 VITAMIN D 25 HYDROXY: CPT

## 2021-12-24 PROCEDURE — 80048 BASIC METABOLIC PNL TOTAL CA: CPT

## 2021-12-24 PROCEDURE — 80061 LIPID PANEL: CPT

## 2022-01-25 ENCOUNTER — HOSPITAL ENCOUNTER (OUTPATIENT)
Dept: MAMMOGRAPHY | Facility: CLINIC | Age: 62
Discharge: HOME | End: 2022-01-25
Attending: INTERNAL MEDICINE | Admitting: INTERNAL MEDICINE
Payer: COMMERCIAL

## 2022-01-25 DIAGNOSIS — Z12.31 ENCOUNTER FOR SCREENING MAMMOGRAM FOR BREAST CANCER: ICD-10-CM

## 2022-01-25 PROCEDURE — 77067 SCR MAMMO BI INCL CAD: CPT

## 2022-02-09 ENCOUNTER — HOSPITAL ENCOUNTER (OUTPATIENT)
Dept: MAMMOGRAPHY | Facility: CLINIC | Age: 62
End: 2022-02-09
Attending: INTERNAL MEDICINE
Payer: COMMERCIAL

## 2022-02-09 DIAGNOSIS — R92.8 ABNORMAL MAMMOGRAM: ICD-10-CM

## 2022-02-09 PROCEDURE — 76642 ULTRASOUND BREAST LIMITED: CPT | Mod: LT

## 2022-02-10 ENCOUNTER — TELEPHONE (OUTPATIENT)
Dept: FAMILY MEDICINE | Facility: CLINIC | Age: 62
End: 2022-02-10
Payer: COMMERCIAL

## 2022-02-10 NOTE — TELEPHONE ENCOUNTER
----- Message from Suzie Hernandez MD sent at 2/9/2022 11:47 PM CST -----  You will need follow up in 6 months for your Mammogram as per radiology report.     Thank you,  Suzie Hernandez MD on 2/9/2022

## 2022-03-31 ENCOUNTER — E-VISIT (OUTPATIENT)
Dept: URGENT CARE | Facility: URGENT CARE | Age: 62
End: 2022-03-31
Payer: COMMERCIAL

## 2022-03-31 DIAGNOSIS — N39.0 ACUTE UTI (URINARY TRACT INFECTION): Primary | ICD-10-CM

## 2022-03-31 PROCEDURE — 99421 OL DIG E/M SVC 5-10 MIN: CPT | Performed by: PHYSICIAN ASSISTANT

## 2022-03-31 RX ORDER — NITROFURANTOIN 25; 75 MG/1; MG/1
100 CAPSULE ORAL 2 TIMES DAILY
Qty: 10 CAPSULE | Refills: 0 | Status: SHIPPED | OUTPATIENT
Start: 2022-03-31 | End: 2022-04-05

## 2022-03-31 NOTE — PATIENT INSTRUCTIONS
Dear Carina Chaney    After reviewing your responses, I've been able to diagnose you with a urinary tract infection, which is a common infection of the bladder with bacteria.  This is not a sexually transmitted infection, though urinating immediately after intercourse can help prevent infections.  Drinking lots of fluids is also helpful to clear your current infection and prevent the next one.      I have sent a prescription for antibiotics to your pharmacy to treat this infection.    It is important that you take all of your prescribed medication even if your symptoms are improving after a few doses.  Taking all of your medicine helps prevent the symptoms from returning.     If your symptoms worsen, you develop pain in your back or stomach, develop fevers, or are not improving in 5 days, please contact your primary care provider for an appointment or visit any of our convenient Walk-in or Urgent Care Centers to be seen, which can be found on our website here.    Thanks again for choosing us as your health care partner,    Ulisses Dalton PA-C    Urinary Tract Infections in Women  Urinary tract infections (UTIs) are most often caused by bacteria. These bacteria enter the urinary tract. The bacteria may come from inside the body. Or they may travel from the skin outside the rectum or vagina into the urethra. Female anatomy makes it easy for bacteria from the bowel to enter a woman s urinary tract, which is the most common source of UTI. This means women develop UTIs more often than men. Pain in or around the urinary tract is a common UTI symptom. But the only way to know for sure if you have a UTI for the healthcare provider to test your urine. The two tests that may be done are the urinalysis and urine culture.     Types of UTIs    Cystitis. A bladder infection (cystitis) is the most common UTI in women. You may have urgent or frequent need to pee. You may also have pain, burning when you pee, and bloody  urine.    Urethritis. This is an inflamed urethra, which is the tube that carries urine from the bladder to outside the body. You may have lower stomach or back pain. You may also have urgent or frequent need to pee.    Pyelonephritis. This is a kidney infection. If not treated, it can be serious and damage your kidneys. In severe cases, you may need to stay in the hospital. You may have a fever and lower back pain.    Medicines to treat a UTI  Most UTIs are treated with antibiotics. These kill the bacteria. The length of time you need to take them depends on the type of infection. It may be as short as 3 days. If you have repeated UTIs, you may need a low-dose antibiotic for several months. Take antibiotics exactly as directed. Don t stop taking them until all of the medicine is gone. If you stop taking the antibiotic too soon, the infection may not go away. You may also develop a resistance to the antibiotic. This can make it much harder to treat.   Lifestyle changes to treat and prevent UTIs   The lifestyle changes below will help get rid of your UTI. They may also help prevent future UTIs.     Drink plenty of fluids. This includes water, juice, or other caffeine-free drinks. Fluids help flush bacteria out of your body.    Empty your bladder. Always empty your bladder when you feel the urge to pee. And always pee before going to sleep. Urine that stays in your bladder can lead to infection. Try to pee before and after sex as well.    Practice good personal hygiene. Wipe yourself from front to back after using the toilet. This helps keep bacteria from getting into the urethra.    Use condoms during sex. These help prevent UTIs caused by sexually transmitted bacteria. Also don't use spermicides during sex. These can increase the risk for UTIs. Choose other forms of birth control instead. For women who tend to get UTIs after sex, a low-dose of a preventive antibiotic may be used. Be sure to discuss this option with  your healthcare provider.    Follow up with your healthcare provider as directed. He or she may test to make sure the infection has cleared. If needed, more treatment may be started.  Brenda last reviewed this educational content on 7/1/2019 2000-2021 [employer]. All rights reserved. This information is not intended as a substitute for professional medical care. Always follow your healthcare professional's instructions.

## 2022-04-15 ENCOUNTER — E-VISIT (OUTPATIENT)
Dept: FAMILY MEDICINE | Facility: CLINIC | Age: 62
End: 2022-04-15
Payer: COMMERCIAL

## 2022-04-15 DIAGNOSIS — K50.819 CROHN'S DISEASE OF SMALL AND LARGE INTESTINES WITH COMPLICATION (H): ICD-10-CM

## 2022-04-15 DIAGNOSIS — M54.50 ACUTE LOW BACK PAIN WITHOUT SCIATICA, UNSPECIFIED BACK PAIN LATERALITY: Primary | ICD-10-CM

## 2022-04-15 DIAGNOSIS — S39.012A STRAIN OF LUMBAR REGION, INITIAL ENCOUNTER: ICD-10-CM

## 2022-04-15 PROCEDURE — 99422 OL DIG E/M SVC 11-20 MIN: CPT | Performed by: INTERNAL MEDICINE

## 2022-04-15 NOTE — PATIENT INSTRUCTIONS
Thank you for choosing us for your care. I have placed an order for a prescription so that you can start treatment. View your full visit summary for details by clicking on the link below. Your pharmacist will able to address any questions you may have about the medication.      If you're not feeling better within 2-3 weeks please schedule an appointment.  You can schedule an appointment right here in [place], or call [phone]  If the visit is for the same symptoms as your eVisit, we'll refund the cost of your eVisit if seen within seven days.      Mini Relaxation Exercises  Source [email or web address].org    Mini relaxation exercises are focused breathing techniques that help reduce  anxiety and tension immediately. You can do them with your eyes open or  closed. You can do them any place, at any time; no one will know that you are  doing them.    Good Times to Do a  Mini     Before taking an exam    While at the computer lab waiting for your document to print    While waiting in line    When someone says something that bothers you    While waiting for the announcement of a grade    While waiting for a phone call    In the dentist s chair    When you feel overwhelmed by what you need to accomplish in the near  future    When in pain    When you want to     Mini Version 1- Breath Focused  Position yourself in a supportive comfortable chair or lying in a position that is least painful. (Often this is on your back with pillows under your knees)    a. Count very slowly to yourself from ten down to zero, one number for  each breath. Thus, with the first diaphragmatic breath, you say  ten  to  yourself, with the next breath, you say  nine , etc. If you start feeling  light-headed or dizzy, slow down the counting. When you get to  zero ,  see how you are feeling. If you are feeling better, great! If not, try to  do it again.    b. As you inhale, count very slowly up to four; as you exhale, count slowly  back down to one.  Thus, as you inhale, you say to yourself  one, two,  three, four , as you exhale, you say to yourself  four, three, two, one .  Do this several times.    c. After each inhalation, pause for a few seconds; after you exhale, pause  again for a few seconds. Do this for several breaths.    Mini Version 2- Physical Focused  a. Massage your forehead, jaw, back of neck, and shoulders  b. Stretch and yawn  c. Walk counting four paces as you breathe in and four paces as you  breathe out  d. Coordinate your breath with any activity, for example, writing, jogging,  drawing, lifting weights, walking, etc.    Mini Version 3- Imagery Focused  Position yourself in a supportive comfortable chair or lying in a position that is least painful. (Often this is on your back with pillows under your knees)    a. Briefly picture yourself in a place you find relaxing  b. Contemplate a picture of a natural scene  c. Imagine the characteristics of one of the following or any other object  which portrays characteristics you find calming or supportive in the  moment:    Tree (strong, rooted, expansive)    Mountain (timeless, strong, stable)    Waves (fluid, limitless)    Sun (radiant, warm)    Wind (free)    Mini Version 4- Mindfulness Focused  a. Look out the window for a few moments  b. Notice something new  c. Listen. What is the most distant sound you hear? The next distant?  d. Appreciate the sensation of being in the situation, the feel, smell, sight of  certain objects  e. Focus on being exactly where you are, keeping your thoughts from flowing  into the future or past     Ultrasound and MRI are read by the radiologist. Plain radiographic images are visualized and preliminarily interpreted by the emergency physician with the below findings:    Interpretation per the Radiologist below, if available at the time of this note:    No orders to display         ED BEDSIDE ULTRASOUND:   Performed by ED Physician - none    LABS:  Labs Reviewed   WOUND CULTURE - Abnormal; Notable for the following:        Result Value    Direct Exam FEW NEUTROPHILS (*)     All other components within normal limits   CBC WITH AUTO DIFFERENTIAL - Abnormal; Notable for the following:     Seg Neutrophils 78 (*)     Lymphocytes 12 (*)     Monocytes 9 (*)     All other components within normal limits   BASIC METABOLIC PANEL - Abnormal; Notable for the following:     Glucose 360 (*)     Chloride 94 (*)     All other components within normal limits       All other labs were within normal range or not returned as of this dictation. EMERGENCY DEPARTMENT COURSE and DIFFERENTIAL DIAGNOSIS/MDM:   Vitals:    Vitals:    02/02/18 1141   BP: (!) 146/83   Pulse: 116   Resp: 16   Temp: 98.1 °F (36.7 °C)   TempSrc: Oral   SpO2: 96%   Weight: 276 lb (125.2 kg)   Height: 6' 1\" (1.854 m)       Patient is treated with IV vancomycin and IV Rocephin in the ED and upon discharge is placed on doxycycline and Keflex. MDM    CONSULTS:  PHARMACY TO DOSE VANCOMYCIN    PROCEDURES:    The area of the abscess on the abdominal wall is prepped with Betadine solution. Ethyl chloride was sprayed over the skin and a size 18 sterile needle was used to attempt aspiration. Only a few drops of bloody exudative material was aspirated and a specimen was sent for culture and sensitivity. Procedures    FINAL IMPRESSION      1.  Abscess          DISPOSITION/PLAN   DISPOSITION Decision To Discharge 02/02/2018 04:46:22 PM      PATIENT REFERRED TO:  Ashish Scrugsg DO  [address] [place] [address]  [phone]      As needed    Arlet Mccann DO [place]    Schedule an appointment as soon as possible for a visit       [facility] ED  [address] [facility]  [phone]    As needed, If symptoms worsen      DISCHARGE MEDICATIONS:  Discharge Medication List as of 2/2/2018  4:48 PM      START taking these medications    Details   cephALEXin (KEFLEX) 500 MG capsule Take 1 capsule by mouth 3 times daily, Disp-30 capsule, R-0Print      doxycycline monohydrate (ADOXA) 100 MG tablet Take 1 tablet by mouth 2 times daily for 10 days, Disp-20 tablet, R-0Print      oxyCODONE-acetaminophen (PERCOCET) 5-325 MG per tablet Take 1 tablet by mouth 3 times daily as needed for Pain for up to 7 days. , Disp-20 tablet, R-0Print                Problem List:  Patient Active Problem List   Diagnosis Code    Diabetes mellitus ([facility] [address] E11.9    Hyperlipidemia E78.5    Hypertension I10    Lumbar radiculopathy M54.16    Obstructive sleep apnea syndrome G47.33    Restless legs syndrome G25.81           Summation      Patient Course:  Stable    ED Medications administered this visit:    Medications   0.9 % sodium chloride bolus (0 mLs Intravenous Stopped 2/2/18 1703)   ondansetron (ZOFRAN) injection 4 mg (4 mg Intravenous Given 2/2/18 1239)   HYDROmorphone (DILAUDID) injection 1 mg (1 mg Intravenous Given 2/2/18 1241)   vancomycin (VANCOCIN) 1,750 mg in dextrose 5 % 500 mL IVPB (0 mg Intravenous Stopped 2/2/18 1434)   cefTRIAXone (ROCEPHIN) 2 g IVPB in D5W 50ml minibag (0 g Intravenous Stopped 2/2/18 1601)       New Prescriptions from this visit:    Discharge Medication List as of 2/2/2018  4:48 PM      START taking these medications    Details   cephALEXin (KEFLEX) 500 MG capsule Take 1 capsule by mouth 3 times daily, Disp-30 capsule, R-0Print      doxycycline monohydrate (ADOXA) 100 MG tablet Take 1 tablet by mouth 2 times daily for 10 days, Disp-20 tablet, R-0Print      oxyCODONE-acetaminophen (PERCOCET)

## 2022-04-15 NOTE — TELEPHONE ENCOUNTER
Provider E-Visit time total (minutes): 11 minutes    Sent in Enliken message as below -     Hi Arlys,     Given your past history as you have mentioned , it is OK to try muscle relaxor. If no improvement in 1-2 weeks , recommend for an inclinic visit before placing referral to Physical therapy.     Sent in Zanaflex a milder form of muscle relaxor. All of them have drowsiness as side effect. Will need to avoid driving when you take it and recommend to take at bedtime as needed.     Please let me know if you have any questions.     Thank you,  Suzie Hernandez MD on 4/15/2022 at 12:36 PM

## 2022-08-02 ENCOUNTER — HOSPITAL ENCOUNTER (EMERGENCY)
Facility: CLINIC | Age: 62
Discharge: HOME | End: 2022-08-02
Attending: EMERGENCY MEDICINE | Admitting: EMERGENCY MEDICINE
Payer: COMMERCIAL

## 2022-08-02 ENCOUNTER — APPOINTMENT (OUTPATIENT)
Dept: GENERAL RADIOLOGY | Facility: CLINIC | Age: 62
End: 2022-08-02
Attending: EMERGENCY MEDICINE
Payer: COMMERCIAL

## 2022-08-02 VITALS
RESPIRATION RATE: 20 BRPM | HEART RATE: 87 BPM | DIASTOLIC BLOOD PRESSURE: 85 MMHG | SYSTOLIC BLOOD PRESSURE: 103 MMHG | OXYGEN SATURATION: 97 % | TEMPERATURE: 97 F

## 2022-08-02 DIAGNOSIS — R07.89 ATYPICAL CHEST PAIN: ICD-10-CM

## 2022-08-02 LAB
ANION GAP SERPL CALCULATED.3IONS-SCNC: 3 MMOL/L (ref 3–14)
BASOPHILS # BLD AUTO: 0.1 10E3/UL (ref 0–0.2)
BASOPHILS NFR BLD AUTO: 1 %
BUN SERPL-MCNC: 15 MG/DL (ref 7–30)
CALCIUM SERPL-MCNC: 9.8 MG/DL (ref 8.5–10.1)
CHLORIDE BLD-SCNC: 105 MMOL/L (ref 94–109)
CO2 SERPL-SCNC: 30 MMOL/L (ref 20–32)
CREAT SERPL-MCNC: 0.78 MG/DL (ref 0.52–1.04)
D DIMER PPP FEU-MCNC: 0.31 UG/ML FEU (ref 0–0.5)
EOSINOPHIL # BLD AUTO: 0.1 10E3/UL (ref 0–0.7)
EOSINOPHIL NFR BLD AUTO: 1 %
ERYTHROCYTE [DISTWIDTH] IN BLOOD BY AUTOMATED COUNT: 11.9 % (ref 10–15)
GFR SERPL CREATININE-BSD FRML MDRD: 86 ML/MIN/1.73M2
GLUCOSE BLD-MCNC: 113 MG/DL (ref 70–99)
HCT VFR BLD AUTO: 43.3 % (ref 35–47)
HGB BLD-MCNC: 13.9 G/DL (ref 11.7–15.7)
HOLD SPECIMEN: NORMAL
IMM GRANULOCYTES # BLD: 0 10E3/UL
IMM GRANULOCYTES NFR BLD: 0 %
LYMPHOCYTES # BLD AUTO: 1.9 10E3/UL (ref 0.8–5.3)
LYMPHOCYTES NFR BLD AUTO: 27 %
MCH RBC QN AUTO: 30.6 PG (ref 26.5–33)
MCHC RBC AUTO-ENTMCNC: 32.1 G/DL (ref 31.5–36.5)
MCV RBC AUTO: 95 FL (ref 78–100)
MONOCYTES # BLD AUTO: 0.6 10E3/UL (ref 0–1.3)
MONOCYTES NFR BLD AUTO: 8 %
NEUTROPHILS # BLD AUTO: 4.5 10E3/UL (ref 1.6–8.3)
NEUTROPHILS NFR BLD AUTO: 63 %
NRBC # BLD AUTO: 0 10E3/UL
NRBC BLD AUTO-RTO: 0 /100
PLATELET # BLD AUTO: 301 10E3/UL (ref 150–450)
POTASSIUM BLD-SCNC: 3.7 MMOL/L (ref 3.4–5.3)
RBC # BLD AUTO: 4.54 10E6/UL (ref 3.8–5.2)
SODIUM SERPL-SCNC: 138 MMOL/L (ref 133–144)
TROPONIN I SERPL HS-MCNC: 3 NG/L
TROPONIN I SERPL HS-MCNC: 3 NG/L
WBC # BLD AUTO: 7.2 10E3/UL (ref 4–11)

## 2022-08-02 PROCEDURE — 71046 X-RAY EXAM CHEST 2 VIEWS: CPT

## 2022-08-02 PROCEDURE — 84484 ASSAY OF TROPONIN QUANT: CPT | Performed by: EMERGENCY MEDICINE

## 2022-08-02 PROCEDURE — 85379 FIBRIN DEGRADATION QUANT: CPT | Performed by: EMERGENCY MEDICINE

## 2022-08-02 PROCEDURE — 99285 EMERGENCY DEPT VISIT HI MDM: CPT | Mod: 25

## 2022-08-02 PROCEDURE — 80048 BASIC METABOLIC PNL TOTAL CA: CPT | Performed by: EMERGENCY MEDICINE

## 2022-08-02 PROCEDURE — 93005 ELECTROCARDIOGRAM TRACING: CPT

## 2022-08-02 PROCEDURE — 85025 COMPLETE CBC W/AUTO DIFF WBC: CPT | Performed by: EMERGENCY MEDICINE

## 2022-08-02 PROCEDURE — 36415 COLL VENOUS BLD VENIPUNCTURE: CPT | Performed by: EMERGENCY MEDICINE

## 2022-08-02 ASSESSMENT — ENCOUNTER SYMPTOMS
DIARRHEA: 0
BLOOD IN STOOL: 0
VOMITING: 0
FEVER: 0
BRUISES/BLEEDS EASILY: 1
ABDOMINAL PAIN: 0
COUGH: 0

## 2022-08-02 NOTE — ED PROVIDER NOTES
History   Chief Complaint:  Chest Pain    The history is provided by the patient.      Carina Chaney is a 62 year old female with history of DVT, bronchitis, and pneumonia who presents with intense, episodic, and lateral right-sided chest pain. Carina reports that her pain is primarily internal and mild but suddenly exacerbates without cause; she had one episode 90 minutes prior to arrival and one episode while in the emergency department. Denies exacerbation of pain with palpation of the chest and deep respirations. Carina reports diaphragmatic pain three days ago which she suspects is the result of wearing a bra with a tight under wire. She additionally reports bruising on her upper thighs without a known cause; she reports they are not itchy. Carina mentions that she bruises easily. Denies cough, fever, emesis, diarrhea, blood in stool, and abdominal pain. Denies recent travel. Carina mentions that she participates in yoga daily.     Review of Systems   Constitutional: Negative for fever.   Respiratory: Negative for cough.    Cardiovascular: Positive for chest pain.   Gastrointestinal: Negative for abdominal pain, blood in stool, diarrhea and vomiting.   Skin: Positive for rash.   Hematological: Bruises/bleeds easily.   All other systems reviewed and are negative.    Allergies:  Alcohol   Hydromorphone  Morphine  Ciprofloxacin  Codeine  Prednisone  Sodium Phosphate    Medications:  The patient is not currently taking any prescribed medications.    Past Medical History:     Crohn's disease of small and large intestines   Environmental allergies   Reactive airway disease   Rhinitis   Phlebitis and thrombophlebitis of upper extremity   Hemorrhoids  IBS   Menopause   DVT  Rectal fistula     Past Surgical History:    Vaginal dilation   Tonsillectomy   Lasik   Botox injections to pelvic floor     Family History:    Father- macular degeneration     Social History:  The patient presents to the ED alone  Drug Use:  marijuana   Occupation:   PCP: MD [employer]     Physical Exam     Patient Vitals for the past 24 hrs:   BP Temp Pulse Resp SpO2   08/02/22 1910 103/85 -- 87 20 97 %   08/02/22 1551 (!) 160/88 97  F (36.1  C) 91 18 98 %     Physical Exam  Constitutional:  Patient is oriented to person, place, and time. They appear well-developed and well-nourished. Mild distress secondary to chest pain   HENT:    Patient is wearing a face mask.   Mouth/Throat:   Patient is wearing a face mask.   Eyes:    Conjunctivae normal and EOM are normal. Pupils are equal, round, and reactive to light.   Neck:    Normal range of motion.   Cardiovascular: Normal rate, regular rhythm and normal heart sounds.  Exam reveals no gallop and no friction rub.  No murmur heard.  Pulmonary/Chest:  Effort normal and breath sounds normal. Patient has no wheezes. Patient has no rales. Patient has no pleuritic pain.   Abdominal:   Soft. Bowel sounds are normal. Patient exhibits no mass. There is no tenderness. There is no rebound and no guarding.   Musculoskeletal:  Normal range of motion. Patient exhibits no edema.   Neurological:   Patient is alert and oriented to person, place, and time. Patient has normal strength. No cranial nerve deficit or sensory deficit. GCS 15  Skin:   A patch of bruising to the bilateral lateral thighs no edema not tender to touch not palpable.   Psychiatric:   Patient has a normal mood and affect. Patient's behavior is normal. Judgment and thought content normal.     Emergency Department Course   ECG:  ECG taken at 1512, ECG read at 1900  Normal sinus rhythm  Low voltage QRS  Borderline ECG  Rate 76 bpm. TX interval 112 ms. QRS duration 82 ms. QT/QTc 352/396 ms. P-R-T axes 59 43 60.    Imaging:  XR Chest 2 Views   Final Result   IMPRESSION:  There are no acute infiltrates. The cardiac silhouette is   not enlarged. Pulmonary vasculature is unremarkable.       WILLIAM SCHROEDER MD            SYSTEM ID:  [id number]        Report per  radiology    Laboratory:  Labs Ordered and Resulted from Time of ED Arrival to Time of ED Departure   BASIC METABOLIC PANEL - Abnormal       Result Value    Sodium 138      Potassium 3.7      Chloride 105      Carbon Dioxide (CO2) 30      Anion Gap 3      Urea Nitrogen 15      Creatinine 0.78      Calcium 9.8      Glucose 113 (*)     GFR Estimate 86     D DIMER QUANTITATIVE - Normal    D-Dimer Quantitative 0.31     TROPONIN I - Normal    Troponin I High Sensitivity 3     TROPONIN I - Normal    Troponin I High Sensitivity 3     CBC WITH PLATELETS AND DIFFERENTIAL    WBC Count 7.2      RBC Count 4.54      Hemoglobin 13.9      Hematocrit 43.3      MCV 95      MCH 30.6      MCHC 32.1      RDW 11.9      Platelet Count 301      % Neutrophils 63      % Lymphocytes 27      % Monocytes 8      % Eosinophils 1      % Basophils 1      % Immature Granulocytes 0      NRBCs per 100 WBC 0      Absolute Neutrophils 4.5      Absolute Lymphocytes 1.9      Absolute Monocytes 0.6      Absolute Eosinophils 0.1      Absolute Basophils 0.1      Absolute Immature Granulocytes 0.0      Absolute NRBCs 0.0       Emergency Department Course:       Reviewed:  I reviewed nursing notes, vitals, past medical history and Care Everywhere    Assessments:  1716 I obtained history and examined the patient as noted above.   1906 I rechecked the patient and explained findings. I discussed discharge with the patient. All questions answered.      Disposition:  The patient was discharged to home.     Impression & Plan     Medical Decision Making:  Carina Chaney is a 62 year old female who presents with chest pain.  The work up in the Emergency Department is negative.  I considered a broad differential diagnosis in this patient including life-threatening etiologies such as acute coronary syndrome, myocardial infarction, pulmonary embolism, acute aortic dissection, myocarditis, pericarditis,  amongst others.  Other causes considered for this patient included  pneumonia, pneumothorax, chest wall source, pericarditis, pleurisy, esophageal spasm, etc.  No serious etiology for the chest pain were detected today during this visit.  This seems to be very atypical.  Also of note she does have some bruising on her bilateral thighs.  It looks like it is in a pressure pattern.  Her platelets are normal.  I do not see any serious cause for this.  Close follow up with primary care is indicated should the pain continue, as further work up may be performed; this was made clear to the patient, who understands.  Questions answered prior to discharge.  Patient felt comfortable with plan of care.    Diagnosis:    ICD-10-CM    1. Atypical chest pain  R07.89      Discharge Medications:  Discharge Medication List as of 8/2/2022  7:04 PM        Scribe Disclosure:  I, Rosa Young, am serving as a scribe at 5:16 PM on 8/2/2022 to document services personally performed by Veronika Dixon MD based on my observations and the provider's statements to me.      Veronika Dixon MD  08/03/22 0012

## 2022-08-02 NOTE — ED PROVIDER NOTES
Rapid Assessment Note    History:   Carina Chaney is a 62 year old female who presents with episodes of sharp upper right chest pain. She states that she had 2 episodes this morning. The patient reports that the episodes are so severe that they have knocked her to the ground. The patient currently has chest pressure. She reports she has had a superficial blood clot before and was on blood thinners. She denies any blood thinners currently. No estrogen or hormonal birth controls. No fever, congestion, cough or runny nose. She notes she regularly exercises.     Exam:   Eyes normal.  ENT normal.  The lungs are clear.  There is no signs of pneumothorax.  No pulmonary crackles.  The pain is located in the right upper chest and is not reproducible.  Cardiac exam is normal.  The pain is not present at this time.    Plan of Care:   I evaluated the patient and developed an initial plan of care. I discussed this plan and explained that I, or one of my partners, would be returning to complete the evaluation.     The patient had an IV established.  We will get a twelve-lead EKG, check a troponin to make sure that this is not a cardiac etiology, a D-dimer will be ordered given the acute onset of the symptoms, and slight pleuritic nature that is now resolved.  Pleurisy would also be in the differential diagnosis.    I, Melissa Banks, am serving as a scribe to document services personally performed by Rosendo Evangelista MD based on my observations and the provider's statements to me.    08/02/2022  [employer]    Portions of this medical record were completed by a scribe. UPON MY REVIEW AND AUTHENTICATION BY ELECTRONIC SIGNATURE, this confirms (a) I performed the applicable clinical services, and (b) the record is accurate.        Rosendo Evangelista MD  08/02/22 0009

## 2022-08-02 NOTE — ED TRIAGE NOTES
Patient comes in with sharp chest pain that came twice earlier today.  The pain comes on very suddenly and the patient states it brought her down to the ground. The patient does not have pain right now but does endorse some right sided chest pressure right now.

## 2022-08-03 LAB
ATRIAL RATE - MUSE: 76 BPM
DIASTOLIC BLOOD PRESSURE - MUSE: NORMAL MMHG
INTERPRETATION ECG - MUSE: NORMAL
P AXIS - MUSE: 59 DEGREES
PR INTERVAL - MUSE: 112 MS
QRS DURATION - MUSE: 82 MS
QT - MUSE: 352 MS
QTC - MUSE: 396 MS
R AXIS - MUSE: 43 DEGREES
SYSTOLIC BLOOD PRESSURE - MUSE: NORMAL MMHG
T AXIS - MUSE: 60 DEGREES
VENTRICULAR RATE- MUSE: 76 BPM

## 2022-09-24 ENCOUNTER — HOSPITAL ENCOUNTER (EMERGENCY)
Facility: CLINIC | Age: 62
Discharge: HOME | End: 2022-09-24
Attending: EMERGENCY MEDICINE | Admitting: EMERGENCY MEDICINE
Payer: COMMERCIAL

## 2022-09-24 VITALS
HEART RATE: 62 BPM | RESPIRATION RATE: 18 BRPM | OXYGEN SATURATION: 100 % | SYSTOLIC BLOOD PRESSURE: 146 MMHG | TEMPERATURE: 98.8 F | DIASTOLIC BLOOD PRESSURE: 80 MMHG

## 2022-09-24 DIAGNOSIS — A49.9 UTI (URINARY TRACT INFECTION), BACTERIAL: ICD-10-CM

## 2022-09-24 DIAGNOSIS — N39.0 UTI (URINARY TRACT INFECTION), BACTERIAL: ICD-10-CM

## 2022-09-24 LAB
ALBUMIN UR-MCNC: 20 MG/DL
ANION GAP SERPL CALCULATED.3IONS-SCNC: 6 MMOL/L (ref 3–14)
APPEARANCE UR: ABNORMAL
BACTERIA #/AREA URNS HPF: ABNORMAL /HPF
BASOPHILS # BLD AUTO: 0.1 10E3/UL (ref 0–0.2)
BASOPHILS NFR BLD AUTO: 1 %
BILIRUB UR QL STRIP: NEGATIVE
BUN SERPL-MCNC: 13 MG/DL (ref 7–30)
CALCIUM SERPL-MCNC: 9.3 MG/DL (ref 8.5–10.1)
CHLORIDE BLD-SCNC: 104 MMOL/L (ref 94–109)
CO2 SERPL-SCNC: 31 MMOL/L (ref 20–32)
COLOR UR AUTO: ABNORMAL
CREAT SERPL-MCNC: 0.56 MG/DL (ref 0.52–1.04)
EOSINOPHIL # BLD AUTO: 0 10E3/UL (ref 0–0.7)
EOSINOPHIL NFR BLD AUTO: 0 %
ERYTHROCYTE [DISTWIDTH] IN BLOOD BY AUTOMATED COUNT: 11.8 % (ref 10–15)
GFR SERPL CREATININE-BSD FRML MDRD: >90 ML/MIN/1.73M2
GLUCOSE BLD-MCNC: 160 MG/DL (ref 70–99)
GLUCOSE UR STRIP-MCNC: NEGATIVE MG/DL
HCT VFR BLD AUTO: 39.5 % (ref 35–47)
HGB BLD-MCNC: 12.8 G/DL (ref 11.7–15.7)
HGB UR QL STRIP: NEGATIVE
IMM GRANULOCYTES # BLD: 0 10E3/UL
IMM GRANULOCYTES NFR BLD: 0 %
KETONES UR STRIP-MCNC: NEGATIVE MG/DL
LEUKOCYTE ESTERASE UR QL STRIP: ABNORMAL
LYMPHOCYTES # BLD AUTO: 1.4 10E3/UL (ref 0.8–5.3)
LYMPHOCYTES NFR BLD AUTO: 13 %
MCH RBC QN AUTO: 31.2 PG (ref 26.5–33)
MCHC RBC AUTO-ENTMCNC: 32.4 G/DL (ref 31.5–36.5)
MCV RBC AUTO: 96 FL (ref 78–100)
MONOCYTES # BLD AUTO: 0.3 10E3/UL (ref 0–1.3)
MONOCYTES NFR BLD AUTO: 2 %
MUCOUS THREADS #/AREA URNS LPF: PRESENT /LPF
NEUTROPHILS # BLD AUTO: 8.9 10E3/UL (ref 1.6–8.3)
NEUTROPHILS NFR BLD AUTO: 84 %
NITRATE UR QL: POSITIVE
NRBC # BLD AUTO: 0 10E3/UL
NRBC BLD AUTO-RTO: 0 /100
PH UR STRIP: 8 [PH] (ref 5–7)
PLATELET # BLD AUTO: 303 10E3/UL (ref 150–450)
POTASSIUM BLD-SCNC: 3.3 MMOL/L (ref 3.4–5.3)
RBC # BLD AUTO: 4.1 10E6/UL (ref 3.8–5.2)
RBC URINE: 0 /HPF
SODIUM SERPL-SCNC: 141 MMOL/L (ref 133–144)
SP GR UR STRIP: 1.02 (ref 1–1.03)
SQUAMOUS EPITHELIAL: 3 /HPF
UROBILINOGEN UR STRIP-MCNC: NORMAL MG/DL
WBC # BLD AUTO: 10.6 10E3/UL (ref 4–11)
WBC URINE: 10 /HPF

## 2022-09-24 PROCEDURE — 250N000011 HC RX IP 250 OP 636: Performed by: EMERGENCY MEDICINE

## 2022-09-24 PROCEDURE — 87086 URINE CULTURE/COLONY COUNT: CPT | Performed by: EMERGENCY MEDICINE

## 2022-09-24 PROCEDURE — 96361 HYDRATE IV INFUSION ADD-ON: CPT

## 2022-09-24 PROCEDURE — 96375 TX/PRO/DX INJ NEW DRUG ADDON: CPT

## 2022-09-24 PROCEDURE — 80048 BASIC METABOLIC PNL TOTAL CA: CPT | Performed by: EMERGENCY MEDICINE

## 2022-09-24 PROCEDURE — 81001 URINALYSIS AUTO W/SCOPE: CPT | Performed by: EMERGENCY MEDICINE

## 2022-09-24 PROCEDURE — 99285 EMERGENCY DEPT VISIT HI MDM: CPT | Mod: 25

## 2022-09-24 PROCEDURE — 36415 COLL VENOUS BLD VENIPUNCTURE: CPT | Performed by: EMERGENCY MEDICINE

## 2022-09-24 PROCEDURE — 258N000003 HC RX IP 258 OP 636: Performed by: EMERGENCY MEDICINE

## 2022-09-24 PROCEDURE — 96365 THER/PROPH/DIAG IV INF INIT: CPT

## 2022-09-24 PROCEDURE — 85025 COMPLETE CBC W/AUTO DIFF WBC: CPT | Performed by: EMERGENCY MEDICINE

## 2022-09-24 PROCEDURE — 250N000013 HC RX MED GY IP 250 OP 250 PS 637: Performed by: EMERGENCY MEDICINE

## 2022-09-24 RX ORDER — CEFTRIAXONE 1 G/1
1 INJECTION, POWDER, FOR SOLUTION INTRAMUSCULAR; INTRAVENOUS ONCE
Status: COMPLETED | OUTPATIENT
Start: 2022-09-24 | End: 2022-09-24

## 2022-09-24 RX ORDER — HYDROMORPHONE HYDROCHLORIDE 1 MG/ML
0.5 INJECTION, SOLUTION INTRAMUSCULAR; INTRAVENOUS; SUBCUTANEOUS
Status: DISCONTINUED | OUTPATIENT
Start: 2022-09-24 | End: 2022-09-24 | Stop reason: HOSPADM

## 2022-09-24 RX ORDER — ONDANSETRON 2 MG/ML
4 INJECTION INTRAMUSCULAR; INTRAVENOUS ONCE
Status: COMPLETED | OUTPATIENT
Start: 2022-09-24 | End: 2022-09-24

## 2022-09-24 RX ORDER — DICYCLOMINE HYDROCHLORIDE 10 MG/ML
20 INJECTION INTRAMUSCULAR 4 TIMES DAILY
Status: DISCONTINUED | OUTPATIENT
Start: 2022-09-24 | End: 2022-09-24

## 2022-09-24 RX ORDER — CEPHALEXIN 500 MG/1
500 CAPSULE ORAL 4 TIMES DAILY
Qty: 28 CAPSULE | Refills: 0 | Status: SHIPPED | OUTPATIENT
Start: 2022-09-24 | End: 2022-10-01

## 2022-09-24 RX ORDER — HYDROCODONE BITARTRATE AND ACETAMINOPHEN 5; 325 MG/1; MG/1
1 TABLET ORAL EVERY 6 HOURS PRN
Qty: 10 TABLET | Refills: 0 | Status: SHIPPED | OUTPATIENT
Start: 2022-09-24 | End: 2022-09-27

## 2022-09-24 RX ORDER — DICYCLOMINE HYDROCHLORIDE 10 MG/ML
20 INJECTION INTRAMUSCULAR ONCE
Status: DISCONTINUED | OUTPATIENT
Start: 2022-09-24 | End: 2022-09-24

## 2022-09-24 RX ORDER — DICYCLOMINE HYDROCHLORIDE 10 MG/1
20 CAPSULE ORAL ONCE
Status: COMPLETED | OUTPATIENT
Start: 2022-09-24 | End: 2022-09-24

## 2022-09-24 RX ORDER — KETOROLAC TROMETHAMINE 15 MG/ML
15 INJECTION, SOLUTION INTRAMUSCULAR; INTRAVENOUS ONCE
Status: COMPLETED | OUTPATIENT
Start: 2022-09-24 | End: 2022-09-24

## 2022-09-24 RX ADMIN — DICYCLOMINE HYDROCHLORIDE 20 MG: 10 CAPSULE ORAL at 07:43

## 2022-09-24 RX ADMIN — KETOROLAC TROMETHAMINE 15 MG: 15 INJECTION, SOLUTION INTRAMUSCULAR; INTRAVENOUS at 03:52

## 2022-09-24 RX ADMIN — HYDROMORPHONE HYDROCHLORIDE 0.5 MG: 1 INJECTION, SOLUTION INTRAMUSCULAR; INTRAVENOUS; SUBCUTANEOUS at 04:10

## 2022-09-24 RX ADMIN — SODIUM CHLORIDE 1000 ML: 9 INJECTION, SOLUTION INTRAVENOUS at 03:51

## 2022-09-24 RX ADMIN — ONDANSETRON 4 MG: 2 INJECTION INTRAMUSCULAR; INTRAVENOUS at 03:53

## 2022-09-24 RX ADMIN — CEFTRIAXONE SODIUM 1 G: 1 INJECTION, POWDER, FOR SOLUTION INTRAMUSCULAR; INTRAVENOUS at 05:41

## 2022-09-24 ASSESSMENT — ACTIVITIES OF DAILY LIVING (ADL)
ADLS_ACUITY_SCORE: 37

## 2022-09-24 NOTE — ED PROVIDER NOTES
"  History     Chief Complaint:  Abdominal Pain       HPI   Carina Chaney is a 62 year old female with a history significant for Crohn's colitis who presents today with abdominal pain, nausea, vomiting.  She states after she ate chicken tonight, approximately 3 hours later, she developed significant lower suprapubic abdominal pain as well as nausea and vomiting.  She denies diarrhea or fevers.  She states she had the exact same pain before with \"food poisoning.\"  And that typically she gets significant abdominal cramping from this, requiring an ER visit.  Last time I can see she presented for something similar was back in 2017.  She ultimately was admitted as a CT showed colitis but her symptoms self resolved shortly after her admission.    ROS:  Review of Systems  Positive-abdominal pain, nausea, vomiting  Negative-diarrhea, fever  Remaining pertinent 10 point ROS negative    Allergies:  Hydromorphone  Morphine  Ciprofloxacin  Codeine  Prednisone  Sodium Phosphate     Medications:    cephALEXin (KEFLEX) 500 MG capsule  HYDROcodone-acetaminophen (NORCO) 5-325 MG tablet  betamethasone dipropionate (DIPROSONE) 0.05 % external cream  Cholecalciferol (VITAMIN D) 50 MCG (2000 UT) CAPS  Fish Oil-Cholecalciferol (FISH OIL + D3) 5255-8163 MG-UNIT CAPS  PEPPERMINT OIL PO  tiZANidine (ZANAFLEX) 4 MG tablet  TURMERIC PO        Past Medical History:    Past Medical History:   Diagnosis Date     Colitis      Crohn disease (H)        Past Surgical History:    Past Surgical History:   Procedure Laterality Date     ENT SURGERY       GYN SURGERY          Family History:    family history is not on file.    Social History:   reports that she has never smoked. She has never used smokeless tobacco. She reports current alcohol use. She reports current drug use. Drug: Marijuana.  PCP: Suzie Hernandez     Physical Exam     Patient Vitals for the past 24 hrs:   BP Temp Temp src Pulse Resp SpO2   09/24/22 0530 (!) 146/80 -- -- 62 -- 100 % "   09/24/22 0515 (!) 151/84 -- -- 64 -- 100 %   09/24/22 0500 136/79 -- -- 63 -- 100 %   09/24/22 0445 139/75 -- -- 67 -- 100 %   09/24/22 0430 138/67 -- -- 66 -- 100 %   09/24/22 0415 134/77 -- -- 70 -- 98 %   09/24/22 0400 (!) 151/93 -- -- 69 -- 100 %   09/24/22 0307 (!) 147/73 98.8  F (37.1  C) Oral 73 18 99 %        Physical Exam  General/Appearance: appears stated age, well-groomed, appears to be in significant distress  Eyes: EOMI, no scleral injection, no icterus  ENT: MMM  Neck: supple, nl ROM, no stiffness  Cardiovascular: RRR, nl S1S2, no m/r/g, 2+ pulses in all 4 extremities, cap refill <2sec  Respiratory: CTAB, good air movement throughout, no wheezes/rhonchi/rales, no increased WOB, no retractions  GI: abd soft, non-distended, nttp,  no HSM, no rebound, no guarding, nl BS  MSK: RIZO, good tone, no bony abnormality  Skin: warm and well-perfused, no rash, no edema, no ecchymosis, nl turgor  Neuro: GCS 15, alert and oriented, no gross focal neuro deficits  Heme: no petechia, no purpura, no active bleeding        Emergency Department Course     Laboratory:  Labs Ordered and Resulted from Time of ED Arrival to Time of ED Departure   ROUTINE UA WITH MICROSCOPIC REFLEX TO CULTURE - Abnormal       Result Value    Color Urine Light Yellow      Appearance Urine Slightly Cloudy (*)     Glucose Urine Negative      Bilirubin Urine Negative      Ketones Urine Negative      Specific Gravity Urine 1.020      Blood Urine Negative      pH Urine 8.0 (*)     Protein Albumin Urine 20  (*)     Urobilinogen Urine Normal      Nitrite Urine Positive (*)     Leukocyte Esterase Urine Small (*)     Bacteria Urine Few (*)     Mucus Urine Present (*)     RBC Urine 0      WBC Urine 10 (*)     Squamous Epithelials Urine 3 (*)    BASIC METABOLIC PANEL - Abnormal    Sodium 141      Potassium 3.3 (*)     Chloride 104      Carbon Dioxide (CO2) 31      Anion Gap 6      Urea Nitrogen 13      Creatinine 0.56      Calcium 9.3      Glucose 160  (*)     GFR Estimate >90     CBC WITH PLATELETS AND DIFFERENTIAL - Abnormal    WBC Count 10.6      RBC Count 4.10      Hemoglobin 12.8      Hematocrit 39.5      MCV 96      MCH 31.2      MCHC 32.4      RDW 11.8      Platelet Count 303      % Neutrophils 84      % Lymphocytes 13      % Monocytes 2      % Eosinophils 0      % Basophils 1      % Immature Granulocytes 0      NRBCs per 100 WBC 0      Absolute Neutrophils 8.9 (*)     Absolute Lymphocytes 1.4      Absolute Monocytes 0.3      Absolute Eosinophils 0.0      Absolute Basophils 0.1      Absolute Immature Granulocytes 0.0      Absolute NRBCs 0.0     URINE CULTURE        Emergency Department Course:    Reviewed:  I reviewed nursing notes, vitals and past medical history    Assessments:   I obtained history and examined the patient as noted above.   0610 I rechecked the patient and explained findings. Pt is sleeping comfortably    Interventions:  Medications   HYDROmorphone (PF) (DILAUDID) injection 0.5 mg (0.5 mg Intravenous Given 9/24/22 0410)   dicyclomine (BENTYL) capsule 20 mg (has no administration in time range)   ondansetron (ZOFRAN) injection 4 mg (4 mg Intravenous Given 9/24/22 0353)   ketorolac (TORADOL) injection 15 mg (15 mg Intravenous Given 9/24/22 0352)   cefTRIAXone (ROCEPHIN) 1 g vial to attach to  mL bag for ADULTS or NS 50 mL bag for PEDS (0 g Intravenous Stopped 9/24/22 0614)   0.9% sodium chloride BOLUS (0 mLs Intravenous Stopped 9/24/22 0614)        Disposition:  The patient was discharged to home.     Impression & Plan      Medical Decision Making:  This patient has h/o Chrons who p/w abd pain n/v, originally consistent with previous gastroenteritis episodes. Labs unremarkable and repeat abd exam benign for ttp.  UA consistent with a urinary tract infection There has been no fever, back/flank pain to suggest pyelo.  There is no clinical evidence of pyelonephritis, appendicitis,  or diverticulitis.  The patient will be started on  antibiotics for the infection. Return if increasing pain, vomiting, fever, or inability to tolerate the oral antibiotic.  Follow up with primary physician is indicated if not improving in 2-3 days.      Diagnosis:    ICD-10-CM    1. UTI (urinary tract infection), bacterial  N39.0     A49.9         Discharge Medications:  New Prescriptions    CEPHALEXIN (KEFLEX) 500 MG CAPSULE    Take 1 capsule (500 mg) by mouth 4 times daily for 7 days    HYDROCODONE-ACETAMINOPHEN (NORCO) 5-325 MG TABLET    Take 1 tablet by mouth every 6 hours as needed for severe pain (7-10)        9/24/2022   Karin Yates*        Karin Yates MD  09/24/22 0669

## 2022-09-24 NOTE — ED TRIAGE NOTES
Patient BIBA c/o abd pain, nausea and vomiting. Patient states she ate some chicken around 2100 and started vomiting around 2200. Patient thinks she has food poisoning.  Patient took her own zofran at home and was given 4mg IV zofran by EMS. .

## 2022-09-25 LAB — BACTERIA UR CULT: ABNORMAL

## 2022-09-26 NOTE — RESULT ENCOUNTER NOTE
Final Urine Culture Report on 9/25/22  [facility] Emergency Dept discharge antibiotic prescribed: Cephalexin (Keflex) 500 mg capsule, 1 capsule (500 mg) by mouth 4 times daily for 7 days.  #1. Bacteria, >100,000 CFU/ML Escherichia coli  , is SUSCEPTIBLE to Antibiotic.    No change in treatment per [facility] ED lab result Urine Culture protocol.

## 2022-09-28 ENCOUNTER — PATIENT OUTREACH (OUTPATIENT)
Dept: FAMILY MEDICINE | Facility: CLINIC | Age: 62
End: 2022-09-28
Payer: COMMERCIAL

## 2022-09-28 PROCEDURE — 99207 PR NO CHARGE LOS: CPT | Performed by: INTERNAL MEDICINE

## 2022-09-28 NOTE — TELEPHONE ENCOUNTER
OK to schedule Annual physical with PAP in 12/2022.   Thank you,  Suzie Hernandez MD on 9/28/2022

## 2022-10-16 ENCOUNTER — HEALTH MAINTENANCE LETTER (OUTPATIENT)
Age: 62
End: 2022-10-16

## 2022-11-18 ENCOUNTER — OFFICE VISIT (OUTPATIENT)
Dept: FAMILY MEDICINE | Facility: CLINIC | Age: 62
End: 2022-11-18
Payer: COMMERCIAL

## 2022-11-18 VITALS
HEIGHT: 66 IN | HEART RATE: 73 BPM | SYSTOLIC BLOOD PRESSURE: 120 MMHG | TEMPERATURE: 97.8 F | DIASTOLIC BLOOD PRESSURE: 79 MMHG | WEIGHT: 125 LBS | BODY MASS INDEX: 20.09 KG/M2 | OXYGEN SATURATION: 98 %

## 2022-11-18 DIAGNOSIS — Z12.4 CERVICAL CANCER SCREENING: ICD-10-CM

## 2022-11-18 DIAGNOSIS — Z13.6 ENCOUNTER FOR LIPID SCREENING FOR CARDIOVASCULAR DISEASE: ICD-10-CM

## 2022-11-18 DIAGNOSIS — Z00.00 ROUTINE GENERAL MEDICAL EXAMINATION AT A HEALTH CARE FACILITY: Primary | ICD-10-CM

## 2022-11-18 DIAGNOSIS — Z12.11 SCREEN FOR COLON CANCER: ICD-10-CM

## 2022-11-18 DIAGNOSIS — R73.9 HYPERGLYCEMIA: ICD-10-CM

## 2022-11-18 DIAGNOSIS — Z13.220 ENCOUNTER FOR LIPID SCREENING FOR CARDIOVASCULAR DISEASE: ICD-10-CM

## 2022-11-18 DIAGNOSIS — K50.819 CROHN'S DISEASE OF SMALL AND LARGE INTESTINES WITH COMPLICATION (H): ICD-10-CM

## 2022-11-18 DIAGNOSIS — M85.80 AGE-RELATED BONE LOSS: ICD-10-CM

## 2022-11-18 DIAGNOSIS — Z11.59 NEED FOR HEPATITIS C SCREENING TEST: ICD-10-CM

## 2022-11-18 DIAGNOSIS — E87.6 HYPOKALEMIA: ICD-10-CM

## 2022-11-18 LAB
ALBUMIN SERPL BCG-MCNC: 4.3 G/DL (ref 3.5–5.2)
ALP SERPL-CCNC: 80 U/L (ref 35–104)
ALT SERPL W P-5'-P-CCNC: 9 U/L (ref 10–35)
ANION GAP SERPL CALCULATED.3IONS-SCNC: 12 MMOL/L (ref 7–15)
AST SERPL W P-5'-P-CCNC: 25 U/L (ref 10–35)
BILIRUB SERPL-MCNC: 0.4 MG/DL
BUN SERPL-MCNC: 11.3 MG/DL (ref 8–23)
CALCIUM SERPL-MCNC: 9.6 MG/DL (ref 8.8–10.2)
CHLORIDE SERPL-SCNC: 104 MMOL/L (ref 98–107)
CHOLEST SERPL-MCNC: 147 MG/DL
CREAT SERPL-MCNC: 0.64 MG/DL (ref 0.51–0.95)
DEPRECATED CALCIDIOL+CALCIFEROL SERPL-MC: 31 UG/L (ref 20–75)
DEPRECATED HCO3 PLAS-SCNC: 26 MMOL/L (ref 22–29)
GFR SERPL CREATININE-BSD FRML MDRD: >90 ML/MIN/1.73M2
GLUCOSE SERPL-MCNC: 89 MG/DL (ref 70–99)
HBA1C MFR BLD: 5.7 % (ref 0–5.6)
HCV AB SERPL QL IA: NONREACTIVE
HDLC SERPL-MCNC: 58 MG/DL
LDLC SERPL CALC-MCNC: 78 MG/DL
NONHDLC SERPL-MCNC: 89 MG/DL
POTASSIUM SERPL-SCNC: 4 MMOL/L (ref 3.4–5.3)
PROT SERPL-MCNC: 6.6 G/DL (ref 6.4–8.3)
SODIUM SERPL-SCNC: 142 MMOL/L (ref 136–145)
TRIGL SERPL-MCNC: 54 MG/DL

## 2022-11-18 PROCEDURE — 80061 LIPID PANEL: CPT | Performed by: INTERNAL MEDICINE

## 2022-11-18 PROCEDURE — 36415 COLL VENOUS BLD VENIPUNCTURE: CPT | Performed by: INTERNAL MEDICINE

## 2022-11-18 PROCEDURE — 86803 HEPATITIS C AB TEST: CPT | Performed by: INTERNAL MEDICINE

## 2022-11-18 PROCEDURE — 87624 HPV HI-RISK TYP POOLED RSLT: CPT | Performed by: INTERNAL MEDICINE

## 2022-11-18 PROCEDURE — 82306 VITAMIN D 25 HYDROXY: CPT | Performed by: INTERNAL MEDICINE

## 2022-11-18 PROCEDURE — 83036 HEMOGLOBIN GLYCOSYLATED A1C: CPT | Performed by: INTERNAL MEDICINE

## 2022-11-18 PROCEDURE — 99396 PREV VISIT EST AGE 40-64: CPT | Performed by: INTERNAL MEDICINE

## 2022-11-18 PROCEDURE — 80053 COMPREHEN METABOLIC PANEL: CPT | Performed by: INTERNAL MEDICINE

## 2022-11-18 PROCEDURE — G0145 SCR C/V CYTO,THINLAYER,RESCR: HCPCS | Performed by: INTERNAL MEDICINE

## 2022-11-18 RX ORDER — BENZONATATE 200 MG/1
200 CAPSULE ORAL
COMMUNITY
Start: 2021-08-26 | End: 2023-03-21

## 2022-11-18 ASSESSMENT — ENCOUNTER SYMPTOMS
ARTHRALGIAS: 0
MYALGIAS: 0
JOINT SWELLING: 0
DIARRHEA: 0
FREQUENCY: 0
HEMATURIA: 0
SORE THROAT: 0
SHORTNESS OF BREATH: 0
CHILLS: 0
FEVER: 0
PALPITATIONS: 0
CONSTIPATION: 0
EYE PAIN: 0
ABDOMINAL PAIN: 0
DYSURIA: 0
HEMATOCHEZIA: 0
HEADACHES: 0
HEARTBURN: 0
NAUSEA: 0
DIZZINESS: 0
BREAST MASS: 0
WEAKNESS: 0
COUGH: 0
PARESTHESIAS: 0
NERVOUS/ANXIOUS: 0

## 2022-11-18 ASSESSMENT — ASTHMA QUESTIONNAIRES
QUESTION_1 LAST FOUR WEEKS HOW MUCH OF THE TIME DID YOUR ASTHMA KEEP YOU FROM GETTING AS MUCH DONE AT WORK, SCHOOL OR AT HOME: NONE OF THE TIME
ACT_TOTALSCORE: 25
QUESTION_5 LAST FOUR WEEKS HOW WOULD YOU RATE YOUR ASTHMA CONTROL: COMPLETELY CONTROLLED
ACT_TOTALSCORE: 25
QUESTION_3 LAST FOUR WEEKS HOW OFTEN DID YOUR ASTHMA SYMPTOMS (WHEEZING, COUGHING, SHORTNESS OF BREATH, CHEST TIGHTNESS OR PAIN) WAKE YOU UP AT NIGHT OR EARLIER THAN USUAL IN THE MORNING: NOT AT ALL
QUESTION_2 LAST FOUR WEEKS HOW OFTEN HAVE YOU HAD SHORTNESS OF BREATH: NOT AT ALL
QUESTION_4 LAST FOUR WEEKS HOW OFTEN HAVE YOU USED YOUR RESCUE INHALER OR NEBULIZER MEDICATION (SUCH AS ALBUTEROL): NOT AT ALL

## 2022-11-18 ASSESSMENT — PAIN SCALES - GENERAL: PAINLEVEL: NO PAIN (0)

## 2022-11-18 NOTE — PROGRESS NOTES
SUBJECTIVE:   CC: Carina is an 62 year old who presents for preventive health visit.     Patient has been advised of split billing requirements and indicates understanding: Yes  Healthy Habits:   PHQ-2 Total Score: 1    Today's PHQ-2 Score:   PHQ-2 ( 1999 [employer]) 11/18/2022   Q1: Little interest or pleasure in doing things 0   Q2: Feeling down, depressed or hopeless 0   PHQ-2 Score 0   PHQ-2 Total Score (12-17 Years)- Positive if 3 or more points; Administer PHQ-A if positive -   Q1: Little interest or pleasure in doing things Not at all   Q2: Feeling down, depressed or hopeless Several days   PHQ-2 Score 1       Social History     Tobacco Use     Smoking status: Never     Smokeless tobacco: Never   Substance Use Topics     Alcohol use: Yes     Comment: Very little, less than a glass of wine per month     If you drink alcohol do you typically have >3 drinks per day or >7 drinks per week? Yes    Alcohol Use 11/18/2022   Prescreen: >3 drinks/day or >7 drinks/week? No   Prescreen: >3 drinks/day or >7 drinks/week? -   No flowsheet data found.    Reviewed orders with patient.  Reviewed health maintenance and updated orders accordingly - Yes  Lab work is in process  Labs reviewed in EPIC    Breast Cancer Screening:    Breast CA Risk Assessment (FHS-7) 12/17/2021   Do you have a family history of breast, colon, or ovarian cancer? No / Unknown       click delete button to remove this line now  Mammogram Screening: Recommended mammography every 1-2 years with patient discussion and risk factor consideration  Pertinent mammograms are reviewed under the imaging tab.    History of abnormal Pap smear: NO - age 30-65 PAP every 5 years with negative HPV co-testing recommended     Reviewed and updated as needed this visit by clinical staff   Tobacco  Allergies  Meds              Reviewed and updated as needed this visit by Provider                 Past Medical History:   Diagnosis Date     Colitis      Crohn disease (H)      "  Past Surgical History:   Procedure Laterality Date     ENT SURGERY       GYN SURGERY       OB History   No obstetric history on file.       Review of Systems  CONSTITUTIONAL: NEGATIVE for fever, chills, change in weight  INTEGUMENTARY/SKIN: NEGATIVE for worrisome rashes, moles or lesions  EYES: NEGATIVE for vision changes or irritation  ENT: NEGATIVE for ear, mouth and throat problems  RESP: NEGATIVE for significant cough or SOB  BREAST: NEGATIVE for masses, tenderness or discharge  CV: NEGATIVE for chest pain, palpitations or peripheral edema  GI: NEGATIVE for nausea, abdominal pain, heartburn, or change in bowel habits  : NEGATIVE for unusual urinary or vaginal symptoms. No vaginal bleeding.  MUSCULOSKELETAL: NEGATIVE for significant arthralgias or myalgia  NEURO: NEGATIVE for weakness, dizziness or paresthesias  PSYCHIATRIC: NEGATIVE for changes in mood or affect      OBJECTIVE:   /79   Pulse 73   Temp 97.8  F (36.6  C) (Temporal)   Ht 1.68 m (5' 6.14\")   Wt 56.7 kg (125 lb)   LMP  (LMP Unknown)   SpO2 98%   BMI 20.09 kg/m    Physical Exam  GENERAL APPEARANCE: healthy, alert and no distress  EYES: Eyes grossly normal to inspection, PERRL and conjunctivae and sclerae normal  HENT: ear canals and TM's normal, nose and mouth without ulcers or lesions, oropharynx clear and oral mucous membranes moist  NECK: no adenopathy, no asymmetry, masses, or scars and thyroid normal to palpation  RESP: lungs clear to auscultation - no rales, rhonchi or wheezes  BREAST: Defered, Mammogram  CV: regular rate and rhythm, normal S1 S2, no S3 or S4, no murmur, click or rub, no peripheral edema and peripheral pulses strong  ABDOMEN: soft, nontender, no hepatosplenomegaly, no masses and bowel sounds normal  MS: no musculoskeletal defects are noted and gait is age appropriate without ataxia  SKIN: no suspicious lesions or rashes  NEURO: Normal strength and tone, sensory exam grossly normal, mentation intact and speech " normal  PSYCH: mentation appears normal and affect normal/bright    Pelvic Exam:  Vulva: No external lesions, normal hair distribution, no adenopathy  Vagina: Moist, pink, no abnormal discharge, well rugated, no lesions  Cervix: Pap smear is taken, parous, smooth, pink, no visible lesions  Uterus: Normal size, anteverted, non-tender, mobile  Ovaries: No mass, non-tender, mobile    Diagnostic Test Results:  Labs reviewed in Epic    ASSESSMENT/PLAN:       Assessment and Plan  1. Routine general medical examination at a health care facility  Last seen pt in 12/2021 for annual physical and also on E-visits. Last seen in ER on 9/2022 for Abdominal pain and was treated for UTI. Last labs in 9/2022 at ER was showing Hypokalemia, IFG m normal CBC. Last vitamin D and TSH normal  in 12/2021. Due for PAP at this time.   - Comprehensive metabolic panel (BMP + Alb, Alk Phos, ALT, AST, Total. Bili, TP); Future  - Hemoglobin A1c; Future  - Vitamin D Deficiency; Future    2. Crohn's disease of small and large intestines with complication (H)  3. Hypokalemia  Well controlled, will recheck potassium levels which was low in the recent ER visit.   - Comprehensive metabolic panel (BMP + Alb, Alk Phos, ALT, AST, Total. Bili, TP); Future    4. Hyperglycemia  - Hemoglobin A1c; Future    5. Screen for colon cancer  - Colonoscopy Screening  Referral; Future    6. Need for hepatitis C screening test  - Hepatitis C Screen Reflex to HCV RNA Quant and Genotype; Future    7. Cervical cancer screening  - Pap Screen with HPV - recommended age 30 - 65 years    8. Age-related bone loss  - Vitamin D Deficiency; Future    9. Encounter for lipid screening for cardiovascular disease  - Lipid panel reflex to direct LDL Fasting; Future       Patient Instructions   As discussed , please do fasting labs ordered.     ==================================    Preventive Health Recommendations  Female Ages 50 - 64    Yearly exam: See your health care  provider every year in order to  o Review health changes.   o Discuss preventive care.    o Review your medicines if your doctor has prescribed any.      Get a Pap test every three years (unless you have an abnormal result and your provider advi\[id number]\ses testing more often).    If you get Pap tests with HPV test, you only need to test every 5 years, unless you have an abnormal result.     You do not need a Pap test if your uterus was removed (hysterectomy) and you have not had cancer.    You should be tested each year for STDs (sexually transmitted diseases) if you're at risk.     Have a mammogram every 1 to 2 years.    Have a colonoscopy at age 50, or have a yearly FIT test (stool test). These exams screen for colon cancer.      Have a cholesterol test every 5 years, or more often if advised.    Have a diabetes test (fasting glucose) every three years. If you are at risk for diabetes, you should have this test more often.     If you are at risk for osteoporosis (brittle bone disease), think about having a bone density scan (DEXA).    Shots: Get a flu shot each year. Get a tetanus shot every 10 years.    Nutrition:     Eat at least 5 servings of fruits and vegetables each day.    Eat whole-grain bread, whole-wheat pasta and brown rice instead of white grains and rice.    Get adequate Calcium and Vitamin D.     Lifestyle    Exercise at least 150 minutes a week (30 minutes a day, 5 days a week). This will help you control your weight and prevent disease.    Limit alcohol to one drink per day.    No smoking.     Wear sunscreen to prevent skin cancer.     See your dentist every six months for an exam and cleaning.    See your eye doctor every 1 to 2 years.      Return in about 6 months (around 5/18/2023), or if symptoms worsen or fail to improve, for Follow up of last visit, If symptoms persist.    Suzie Hernandez MD  [facility]LEN[facility]      Patient has been advised of split  billing requirements and indicates understanding: Yes      COUNSELING:  Reviewed preventive health counseling, as reflected in patient instructions  Special attention given to:        Regular exercise       Healthy diet/nutrition       Vision screening       Hearing screening       Osteoporosis prevention/bone health       Colorectal Cancer Screening       Consider Hep C screening for all patients one time for ages 18-79 years       (Gina)menopause management        She reports that she has never smoked. She has never used smokeless tobacco.          Suzie Hernandez MD  [facility]

## 2022-11-20 NOTE — RESULT ENCOUNTER NOTE
Your lab work is POSITIVE for Prediabetes. Please follow the diet below for improvement. Will need follow up on this.     All your OTHER labs normal, you may see some highlighted which do not have Clinical significance.  Suzie Hernandez MD on 11/20/2022

## 2022-11-22 LAB
BKR LAB AP GYN ADEQUACY: NORMAL
BKR LAB AP GYN INTERPRETATION: NORMAL
BKR LAB AP HPV REFLEX: NORMAL
BKR LAB AP PREVIOUS ABNORMAL: NORMAL
PATH REPORT.COMMENTS IMP SPEC: NORMAL
PATH REPORT.COMMENTS IMP SPEC: NORMAL
PATH REPORT.RELEVANT HX SPEC: NORMAL

## 2022-11-25 LAB
HUMAN PAPILLOMA VIRUS 16 DNA: NEGATIVE
HUMAN PAPILLOMA VIRUS 18 DNA: NEGATIVE
HUMAN PAPILLOMA VIRUS FINAL DIAGNOSIS: ABNORMAL
HUMAN PAPILLOMA VIRUS OTHER HR: POSITIVE

## 2022-11-30 ENCOUNTER — PATIENT OUTREACH (OUTPATIENT)
Dept: FAMILY MEDICINE | Facility: CLINIC | Age: 62
End: 2022-11-30

## 2022-11-30 DIAGNOSIS — R87.810 CERVICAL HIGH RISK HPV (HUMAN PAPILLOMAVIRUS) TEST POSITIVE: ICD-10-CM

## 2022-11-30 NOTE — TELEPHONE ENCOUNTER
11/18/22 NIL pap, + HR HPV (not 16 or 18). Plan colp due by 2/18/23   PAST MEDICAL HISTORY:  Anxiety     Aortic stenosis Bicuspid Aortic Valve Replacement- 5/29/2018    Carpal tunnel syndrome had surgery    Chronic neck and back pain     Depressive disorder     Gastrointestinal hemorrhage     GERD (gastroesophageal reflux disease)     Helicobacter pylori infection     Herniated lumbar intervertebral disc     History of colonic polyps     HTN (hypertension)     Hyperlipidemia     Internal and external prolapsed hemorrhoids had surgery    Loss of hearing     Sciatica     Skin cancer external right ear, unsure of type

## 2023-01-03 DIAGNOSIS — Z01.812 PRE-PROCEDURE LAB EXAM: Primary | ICD-10-CM

## 2023-01-10 PROBLEM — R87.810 CERVICAL HIGH RISK HPV (HUMAN PAPILLOMAVIRUS) TEST POSITIVE: Status: ACTIVE | Noted: 2022-11-18

## 2023-01-10 NOTE — PROGRESS NOTES
"      SUBJECTIVE:                                                   Carina Chaney is a 62 year old female who presents to clinic today for the following health issue(s):  Patient presents with:  Colposcopy        HPI:  Referred for colposocpy    Hx Crohn's. Not on meds.   Reports no hx prior abnl pap. Notes has had normal screening when lived in [place].    PAP HISTORY   2014 NIL pap  11/18/22 NIL pap, + HR HPV (not 16 or 18). Plan colp due by 2/18/23    No LMP recorded (lmp unknown). Patient is postmenopausal..     Problem list and histories reviewed & adjusted, as indicated.  Additional history: as documented.    Patient Active Problem List   Diagnosis     Crohn's disease of small and large intestines (H)     Environmental allergies     History of tobacco use     Reactive airway disease without complication     Rhinitis     Thrombophlebitis of upper extremity     Unspecified hemorrhoids     Crohn's disease of small intestine (H)     Marijuana smoker     Irritable bowel syndrome     Cervical high risk HPV (human papillomavirus) test positive     Past Surgical History:   Procedure Laterality Date     ENT SURGERY       GYN SURGERY        Social History     Tobacco Use     Smoking status: Never     Smokeless tobacco: Never   Substance Use Topics     Alcohol use: Yes     Comment: Very little, less than a glass of wine per month      Problem (# of Occurrences) Relation (Name,Age of Onset)    Hypertension (1) Father            Current Outpatient Medications   Medication Sig     benzonatate (TESSALON) 200 MG capsule Take 200 mg by mouth     No current facility-administered medications for this visit.     Allergies   Allergen Reactions     Hydromorphone Nausea and Vomiting     Morphine Nausea and Vomiting and Other (See Comments)     Tolerates oxycodone 5/2014  Severe nausea and vomiting  \"violent vomiting.\"       Ciprofloxacin Nausea and Vomiting and Other (See Comments)     \"cramps\"  Cramps, nausea, vomiting.  Can take with " "caution if really necessary.       Codeine      Prednisone Unknown     Sodium Phosphate Nausea and Vomiting             OBJECTIVE:     /66   Pulse 68   Ht 1.676 m (5' 6\")   Wt 56.7 kg (125 lb)   LMP  (LMP Unknown)   BMI 20.18 kg/m    Body mass index is 20.18 kg/m .    Exam:  Constitutional:  Appearance: Well nourished, well developed alert, in no acute distress  Neurologic:  Mental Status:  Oriented X3.  Normal strength and tone, sensory exam grossly normal, mentation intact and speech normal.    Psychiatric:  Mentation appears normal and affect normal/bright.         ASSESSMENT/PLAN:                                                        ICD-10-CM    1. Cervical high risk HPV (human papillomavirus) test positive  R87.810             -Reviewed guidelines, Nil pap with HPV other pos may be followed with pap/HPV repeat in one year. Colposcopy if persistently HPV pos or new abnormal pap. Would not consider patient immunosuppressed either as she is not on meds.   Pt given opportunity to ask questions, these were answered to her satisfaction, she verbalized understanding to and agreement with the plan.     (20 minutes was spent on the date of the encounter doing chart review, review and interpretation of pertinent test results, history and/or exam, documentation, patient counseling.)        Maryan Vicente Masters, [facility] [place]  "

## 2023-01-12 ENCOUNTER — OFFICE VISIT (OUTPATIENT)
Dept: OBGYN | Facility: CLINIC | Age: 63
End: 2023-01-12
Payer: COMMERCIAL

## 2023-01-12 VITALS
HEIGHT: 66 IN | SYSTOLIC BLOOD PRESSURE: 118 MMHG | DIASTOLIC BLOOD PRESSURE: 66 MMHG | WEIGHT: 125 LBS | HEART RATE: 68 BPM | BODY MASS INDEX: 20.09 KG/M2

## 2023-01-12 DIAGNOSIS — R87.810 CERVICAL HIGH RISK HPV (HUMAN PAPILLOMAVIRUS) TEST POSITIVE: ICD-10-CM

## 2023-01-12 PROCEDURE — 99202 OFFICE O/P NEW SF 15 MIN: CPT | Performed by: OBSTETRICS & GYNECOLOGY

## 2023-01-12 ASSESSMENT — PATIENT HEALTH QUESTIONNAIRE - PHQ9
SUM OF ALL RESPONSES TO PHQ QUESTIONS 1-9: 0
5. POOR APPETITE OR OVEREATING: NOT AT ALL

## 2023-01-12 ASSESSMENT — ANXIETY QUESTIONNAIRES
6. BECOMING EASILY ANNOYED OR IRRITABLE: NOT AT ALL
1. FEELING NERVOUS, ANXIOUS, OR ON EDGE: NOT AT ALL
GAD7 TOTAL SCORE: 0
GAD7 TOTAL SCORE: 0
3. WORRYING TOO MUCH ABOUT DIFFERENT THINGS: NOT AT ALL
IF YOU CHECKED OFF ANY PROBLEMS ON THIS QUESTIONNAIRE, HOW DIFFICULT HAVE THESE PROBLEMS MADE IT FOR YOU TO DO YOUR WORK, TAKE CARE OF THINGS AT HOME, OR GET ALONG WITH OTHER PEOPLE: NOT DIFFICULT AT ALL
5. BEING SO RESTLESS THAT IT IS HARD TO SIT STILL: NOT AT ALL
7. FEELING AFRAID AS IF SOMETHING AWFUL MIGHT HAPPEN: NOT AT ALL
2. NOT BEING ABLE TO STOP OR CONTROL WORRYING: NOT AT ALL

## 2023-01-17 NOTE — TELEPHONE ENCOUNTER
Raymond Mellos,    Did you do a colp for this pt on 1/12/23? If not, ok to recommend cotest in 1 year?  Thanks!    Giulia Saunders RN  Pap Tracking

## 2023-01-18 NOTE — TELEPHONE ENCOUNTER
We did not do a colpo as it is not indicated at this time. Plan cotesting 1 year.    Maryan Vicente Masters, DO

## 2023-01-18 NOTE — TELEPHONE ENCOUNTER
01/12/23 Ob/Gyn consult appt Plan per Ob/Gyn provider, reviewed guidelines, Nil pap with HPV other pos may be followed with pap/HPV repeat in one year. Colposcopy if persistently HPV pos or new abnormal pap. Would not consider patient immunosuppressed either as she is not on meds.

## 2023-03-21 ENCOUNTER — E-VISIT (OUTPATIENT)
Dept: FAMILY MEDICINE | Facility: CLINIC | Age: 63
End: 2023-03-21
Payer: COMMERCIAL

## 2023-03-21 DIAGNOSIS — N39.0 URINARY TRACT INFECTION WITHOUT HEMATURIA, SITE UNSPECIFIED: ICD-10-CM

## 2023-03-21 DIAGNOSIS — R35.0 URINARY FREQUENCY: Primary | ICD-10-CM

## 2023-03-21 PROCEDURE — 99422 OL DIG E/M SVC 11-20 MIN: CPT | Performed by: INTERNAL MEDICINE

## 2023-03-21 NOTE — TELEPHONE ENCOUNTER
Provider E-Visit time total (minutes):  11 minutes      Sent in [employer] message as below -     Raymond Lim,     I am sorry to hear that.  We can always check your urinalysis on the lab only visit for which I placed orders for you before starting an antibiotic.  If you are not positive for UTI you might not need the antibiotic, this could be just your vaginal epithelial changes of menopause causing your current symptoms.  I have reviewed your previous urinalysis results which was positive for UTI in September 2022.    Placed the lab orders at this time for which she will need to make a lab only appointment.  Let me know if you have any questions.    Thank you,  Suzie Hernandez MD on 3/21/2023 at 4:55 PM

## 2023-03-21 NOTE — PATIENT INSTRUCTIONS
Dear Carina Chaney,     After reviewing your responses, I would like you to come in for a urine test to make sure we treat you correctly. This urine test is to evaluate you for a possible urinary tract infection, and should be scheduled for today or tomorrow. Schedule a Lab Only appointment here.     Lab appointments are not available at most locations on the weekends. If no Lab Only appointment is available, you should be seen in any of our convenient Walk-in or Urgent Care Centers, which can be found on our website here.     You will receive instructions with your results in Bag Borrow or Steal once they are available.     If your symptoms worsen, you develop pain in your back or stomach, develop fevers, or are not improving in 5 days, please contact your primary care provider for an appointment or visit a Walk-in or Urgent Care Center to be seen.     Thanks again for choosing us as your health care partner,     Suzie Hernandez MD

## 2023-03-22 ENCOUNTER — OFFICE VISIT (OUTPATIENT)
Dept: INTERNAL MEDICINE | Facility: CLINIC | Age: 63
End: 2023-03-22
Payer: COMMERCIAL

## 2023-03-22 VITALS
OXYGEN SATURATION: 100 % | HEART RATE: 61 BPM | DIASTOLIC BLOOD PRESSURE: 71 MMHG | BODY MASS INDEX: 21.87 KG/M2 | WEIGHT: 136.1 LBS | RESPIRATION RATE: 18 BRPM | HEIGHT: 66 IN | TEMPERATURE: 96.6 F | SYSTOLIC BLOOD PRESSURE: 121 MMHG

## 2023-03-22 DIAGNOSIS — H81.10 BENIGN PAROXYSMAL POSITIONAL VERTIGO, UNSPECIFIED LATERALITY: Primary | ICD-10-CM

## 2023-03-22 DIAGNOSIS — R39.15 URINARY URGENCY: ICD-10-CM

## 2023-03-22 DIAGNOSIS — K50.819 CROHN'S DISEASE OF SMALL AND LARGE INTESTINES WITH COMPLICATION (H): ICD-10-CM

## 2023-03-22 DIAGNOSIS — R11.0 NAUSEA: ICD-10-CM

## 2023-03-22 PROBLEM — H81.11 BPPV (BENIGN PAROXYSMAL POSITIONAL VERTIGO), RIGHT: Status: ACTIVE | Noted: 2023-03-18

## 2023-03-22 LAB
ALBUMIN UR-MCNC: NEGATIVE MG/DL
APPEARANCE UR: CLEAR
BACTERIA #/AREA URNS HPF: ABNORMAL /HPF
BILIRUB UR QL STRIP: NEGATIVE
COLOR UR AUTO: YELLOW
GLUCOSE UR STRIP-MCNC: NEGATIVE MG/DL
HGB UR QL STRIP: ABNORMAL
KETONES UR STRIP-MCNC: NEGATIVE MG/DL
LEUKOCYTE ESTERASE UR QL STRIP: ABNORMAL
NITRATE UR QL: NEGATIVE
PH UR STRIP: 6.5 [PH] (ref 5–7)
RBC #/AREA URNS AUTO: ABNORMAL /HPF
SP GR UR STRIP: 1.01 (ref 1–1.03)
UROBILINOGEN UR STRIP-ACNC: 0.2 E.U./DL
WBC #/AREA URNS AUTO: ABNORMAL /HPF

## 2023-03-22 PROCEDURE — 81001 URINALYSIS AUTO W/SCOPE: CPT | Performed by: INTERNAL MEDICINE

## 2023-03-22 PROCEDURE — 99214 OFFICE O/P EST MOD 30 MIN: CPT | Performed by: INTERNAL MEDICINE

## 2023-03-22 RX ORDER — ONDANSETRON 8 MG/1
8 TABLET, FILM COATED ORAL EVERY 8 HOURS PRN
Qty: 20 TABLET | Refills: 0 | Status: SHIPPED | OUTPATIENT
Start: 2023-03-22 | End: 2023-08-29

## 2023-03-22 RX ORDER — MECLIZINE HCL 12.5 MG 12.5 MG/1
12.5 TABLET ORAL 3 TIMES DAILY PRN
Qty: 30 TABLET | Refills: 0 | Status: SHIPPED | OUTPATIENT
Start: 2023-03-22 | End: 2023-08-29

## 2023-03-22 ASSESSMENT — PAIN SCALES - GENERAL: PAINLEVEL: NO PAIN (0)

## 2023-03-22 NOTE — PATIENT INSTRUCTIONS
Plan:  1. Meclizine 13.5 mg 3 times a day -- as needed for dizziness  2. Zofran 4-8 mg 3 times a day as needed for nausea  3. Physical therapy   4. Schedule a follow up appointment with GI if abd cramps persist

## 2023-03-22 NOTE — PROGRESS NOTES
Patient's instructions / PLAN:                                                        Plan:  1. Meclizine 13.5 mg 3 times a day -- as needed for dizziness  2. Zofran 4-8 mg 3 times a day as needed for nausea  3. Physical therapy   4. Schedule a follow up appointment with GI if abd cramps persist        ASSESSMENT & PLAN:                                                      (H81.10) Benign paroxysmal positional vertigo, unspecified laterality  (primary encounter diagnosis)  Comment:   Plan: meclizine (ANTIVERT) 12.5 MG tablet, Physical         Therapy Referral            (R11.0) Nausea  Comment:   Plan: ondansetron (ZOFRAN) 8 MG tablet, meclizine         (ANTIVERT) 12.5 MG tablet            (K50.819) Crohn's disease of small and large intestines with complication (H)  Comment: advised for clear liq diet and contact GI  Plan: f/u w GI    (R39.15) Urinary urgency  Comment:   Plan: UA macro with reflex to Microscopic and Culture        - Clinc Collect, UA Microscopic with Reflex to         Culture               Chief Complaint:                                                        Dizziness   Abd cramps, urine urgency     SUBJECTIVE:                                                    History of present illness     Vertigo  -- History of Vetrigo   -- this present episode started 5 d ago  -- she did Epley maneuver with help from a friend   -- she felt some benefits but she is not improving a lot  -- no vision changes  -- no numbness tingling weakness in arms legs     Crohn disease  -- she has cramps   -- GI dr Heidi Odell     UTI  -- she has urgency but not burning    Answers for HPI/ROS submitted by the patient on 3/22/2023  How many servings of fruits and vegetables do you eat daily?: 2-3  On average, how many sweetened beverages do you drink each day (Examples: soda, juice, sweet tea, etc.  Do NOT count diet or artificially sweetened beverages)?: 1  How many minutes a day do you exercise enough to make your heart  "beat faster?: 10 to 19  How many days a week do you exercise enough to make your heart beat faster?: 4  How many days per week do you miss taking your medication?: 0  What is the reason for your visit today?: BPPV  When did your symptoms begin?: 3-7 days ago  What are your symptoms?: Dizziness  How would you describe these symptoms?: Moderate  Are your symptoms:: Staying the same  Have you had these symptoms before?: Yes  Have you tried or received treatment for these symptoms before?: Yes  Did that treatment work? : Yes  Please describe the treatment you had:: Epley maneuver  Is there anything that makes you feel worse?: moving head too fast from low to high        ROS:                                                      ROS: negative for fever, chills, cough, wheezes, chest pain, shortness of breath, vomiting, abdominal pain, leg swelling     OBJECTIVE:                                                    Physical Exam :    Blood pressure 121/71, pulse 61, temperature (!) 96.6  F (35.9  C), temperature source Tympanic, resp. rate 18, height 1.676 m (5' 6\"), weight 61.7 kg (136 lb 1.6 oz), SpO2 100 %, not currently breastfeeding.   NAD, appears comfortable  Skin: no rashes   HEENT: PERRLA, EOMI, pink conjunctiva, anicteric sclerae,  Neck: supple, no JVD, No thyroidmegaly. Lymph nodes nonpalpable cervical and supraclavicular.  Chest: clear to auscultation bilaterally, good respiratory effort  Heart: S1 S2, RRR, no mgr appreciated  Abdomen: soft, mild tender, no hepatosplenomegaly or masses appreciated, no abdominal bruit, present bowel sounds  Extremities: no edema,   Neurologic: A, Ox3, no focal signs appreciated  No nystagmus with the  movements, but she does feel dizzy     PMHx: reviewed  Past Medical History:   Diagnosis Date     Colitis      Crohn disease (H)       PSHx: reviewed  Past Surgical History:   Procedure Laterality Date     ENT SURGERY       GYN SURGERY          Meds: reviewed  No current outpatient " medications on file.       Soc Hx: reviewed  Fam Hx: reviewed      Chart documentation was completed, in part, with [employer] voice-recognition software. Even though reviewed, some grammatical, spelling, and word errors may remain.      Radha Tran MD  Internal Medicine

## 2023-03-24 ENCOUNTER — TELEPHONE (OUTPATIENT)
Dept: FAMILY MEDICINE | Facility: CLINIC | Age: 63
End: 2023-03-24
Payer: COMMERCIAL

## 2023-03-24 RX ORDER — NITROFURANTOIN 25; 75 MG/1; MG/1
100 CAPSULE ORAL 2 TIMES DAILY
Qty: 6 CAPSULE | Refills: 0 | Status: SHIPPED | OUTPATIENT
Start: 2023-03-24 | End: 2023-08-29

## 2023-03-24 NOTE — TELEPHONE ENCOUNTER
----- Message from Suzie Hernandez MD sent at 3/24/2023  1:32 AM CDT -----  Your Urine exam is positive for mild UTI . Given your symptoms OK to use 3 days of antibiotics for improvement.

## 2023-03-31 ENCOUNTER — HOSPITAL ENCOUNTER (OUTPATIENT)
Dept: PHYSICAL THERAPY | Facility: CLINIC | Age: 63
Discharge: HOME | End: 2023-03-31
Attending: INTERNAL MEDICINE
Payer: COMMERCIAL

## 2023-03-31 DIAGNOSIS — R42 DIZZINESS: ICD-10-CM

## 2023-03-31 DIAGNOSIS — H81.10 BENIGN PAROXYSMAL POSITIONAL VERTIGO, UNSPECIFIED LATERALITY: Primary | ICD-10-CM

## 2023-03-31 PROCEDURE — 97161 PT EVAL LOW COMPLEX 20 MIN: CPT | Mod: GP

## 2023-03-31 PROCEDURE — 95992 CANALITH REPOSITIONING PROC: CPT | Mod: GP

## 2023-04-03 NOTE — PROGRESS NOTES
03/31/23 1400   Quick Adds   Quick Adds Vestibular Eval   Type of Visit Initial OP PT Evaluation   General Information   Start of Care Date 03/31/23   Referring Physician Radha Jaffe MD   Orders Evaluate and Treat as Indicated   Order Date 03/22/23   Medical Diagnosis Benign paroxysmal positional vertigo, unspecified laterality   Onset of illness/injury or Date of Surgery 03/18/23   Precautions/Limitations fall precautions   Surgical/Medical history reviewed Yes   Pertinent history of current vestibular problem (include personal factors and/or comorbidities that impact the POC)  Anxiety  (very anxious about feeling dizziness and tends to avoid certain movements and activities)   Pertinent history of current problem (include personal factors and/or comorbidities that impact the POC) Pt is 63 y/o female referred to OP PT for a vestibular evaluation. Pt presents today with what she feels is BPPV as she has had BPPV 2x in the past. Once she was treated in [place] in 2021 at a facility whose name she does not remember. And the other time was ~10 years ago when she was in [place]. This time around she states she woke up on 3/18 and could barely sit up d/t severity of vertigo and nausea. She called in sick as soon as she was able because she knew would not be able to make it in. She felt badly most of the day then when able performed the Epley maneuver, which she felt made her feel better. She also performed it again with assist from a friend. She performed both Epley for the R side. Then actually also performed Epley for the L side just to be sure. She feels that it is much better now however still feels a little off. She has since been trying to sleep with her head upright and has been avoiding getting back to yoga and dancing that she usually participates in. She has also only been sleeping on her L side. She has also noticed that driving has actually made her feel a little bit better,  "not more dizzy as she would have expected. She does also feels some dizziness with up and down motion.   Pertinent Visual History  denies changes   Prior level of function comment participates in yoga and has been a dancer for many years - so she \"knows how to spin\"   Patient role/Employment history Employed  ()   Living environment Apartment/condo   Assistive Devices Comments none   Patient/Family Goals Statement wants to fix dizziness   Fall Risk Screen   Fall screen completed by PT   Have you fallen 2 or more times in the past year? No   Have you fallen and had an injury in the past year? No   Timed Up and Go score (seconds) not tested   Is patient a fall risk? Yes;Department fall risk interventions implemented   Fall screen comments fluctuating dizziness and per 4 item DGI   System Outcome Measures   Outcome Measures BPPV   Dizziness Handicap Inventory (score out of 100) A decrease in score by 17.18 or greater indicates a clinically significant change in symptoms. 30   Cognitive Status Examination   Orientation orientation to person, place and time   Level of Consciousness alert   Follows Commands and Answers Questions 100% of the time   Personal Safety and Judgment intact   Memory intact   Posture   Posture Normal   Posture Comments WFL   Bed Mobility   Bed Mobility Comments IND   Transfer Skills   Transfer Comments IND   Gait   Gait Comments Pt ambulates with good speed and no signs of veering   Gait Special Tests   Gait Special Tests 25 FOOT TIMED WALK;DYNAMIC GAIT INDEX   Gait Special Tests 25 Foot Timed Walk   Comments 10mwt: 4.5 sec = 1.3 m/s   Gait Special Tests Dynamic Gait Index   Comments 10/12 4-item DGI (<10/12 indicates increased risk of falls)   Balance Special Tests   Balance Special Tests Modified CTSIB Conditions   Balance Special Tests Modified CTSIB Conditions   Modified CTSIB Comments not tested   Sensory Examination   Sensory Perception Comments denies changes   Cervicogenic " Screen   Neck ROM WFL   Oculomotor Exam   Smooth Pursuit Normal   Saccades Normal   VOR Abnormal   VOR Comments increased dizziness however no retinal slip   VOR Cancellation Normal   Rapid Head Thrust Corrective Saccade R head thrust   Rapid Head Thrust Comments 2x and increased dizziness   Other Oculomotor Exam Comments s/s of potential vestibular hypofunction   Infrared Goggle Exam or Frenzel Lense Exam   Vestibular Suppressant in Last 24 Hours? No   Exam completed with Infrared Goggles   Spontaneous Nystagmus Negative   Gaze Evoked Nystagmus Negative   Head Shake Horizontal Nystagmus Negative   Estrada-Hallpike (right) Downbeating R torsional   Estrada-Hallpike (right) comments Very slight nystagmus, 2-3 beats, symptomatic   Estrada-Hallpike (Left) Downbeating L torsional   Estrada-Hallpike (left) comments lasting ~10 sec with ~3 sec latency, very severe symtpoms and burst out crying d/t fear response   HSCC Supine Roll Test (Right) Negative   HSCC Supine Roll Test (Right) Comments asymptomatic   HSCC Supine Roll Test (Left) Negative   HSCC Supine Roll Test (Left) Comments  asymptomatic   BPPV Canal(s) L Posterior   BPPV Type Canalithasis   Other Infrared Goggle Exam or Frenzel Lense Exam Comments apogeotropic posterior canal  (or L anterior canalithiasis)   Dynamic Visual Acuity (DVA)   DVA Comments not tested   Planned Therapy Interventions   Planned Therapy Interventions balance training;gait training;neuromuscular re-education;other (see comments)  (CRM)   Clinical Impression   Criteria for Skilled Therapeutic Interventions Met yes, treatment indicated   PT Diagnosis Dizziness   Influenced by the following impairments reports of vertigo and dizziness with positional changes, nystagmus with D-H testing, veering with gait, decreased balance, anxiety   Functional limitations due to impairments impaired bed mobility, impaired ADL's, impaired participation in desired recreational activities   Clinical Presentation  Stable/Uncomplicated   Clinical Presentation Rationale high level of anxiety, past h/o BPPV   Clinical Decision Making (Complexity) Low complexity   Therapy Frequency 2 times/Week   Predicted Duration of Therapy Intervention (days/wks) 13 weeks   Risk & Benefits of therapy have been explained Yes   Patient, Family & other staff in agreement with plan of care Yes   Clinical Impression Comments Pt presents to clinic w/ h/o BPPV, today having s/s 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 Pt has high level of anxiety surrounding feeling of dizziness and vertigo that complicate pts case and may required further habituation training following resolution of BPPV in order to return to full PLOF.   Education Assessment   Preferred Learning Style Listening   Barriers to Learning No barriers   GOALS   PT Eval Goals 1;2;3   Goal 1   Goal Identifier DHI   Goal Description Pt to report significant decline in dizziness per DHI scoring </= 12/100 to be considered low perception of handicap.   Goal Progress eval: 30/100   Target Date 06/28/23   Goal 2   Goal Identifier BPPV   Goal Description Pt will demonstrate (-) s/s of BPPV throughout positional testing of all canals without limitations in bed mobility and transfers d/t dizziness   Goal Progress eval: L posterior canalithiasis (apogeotropic) OR L anterior canalithiasis   Target Date 06/28/23   Goal 3   Goal Identifier Head turns with walking   Goal Description Patient will be able to perform head turns while at rest and walking without feeling dizzy or off balance to perform household tasks with greater safety.   Goal Progress eval: 10/12 on 4 item DGI   Target Date 06/28/23   Total Evaluation Time   PT Eval, Low Complexity Minutes (20732) 35     Nuvia Gonzalez, PT, DPT    Physical Therapist  [email or web address]

## 2023-04-11 ENCOUNTER — HOSPITAL ENCOUNTER (OUTPATIENT)
Dept: PHYSICAL THERAPY | Facility: CLINIC | Age: 63
Discharge: HOME | End: 2023-04-11
Payer: COMMERCIAL

## 2023-04-11 DIAGNOSIS — R42 DIZZINESS: ICD-10-CM

## 2023-04-11 DIAGNOSIS — H81.10 BENIGN PAROXYSMAL POSITIONAL VERTIGO, UNSPECIFIED LATERALITY: Primary | ICD-10-CM

## 2023-04-11 PROCEDURE — 95992 CANALITH REPOSITIONING PROC: CPT | Mod: GP

## 2023-04-18 ENCOUNTER — HOSPITAL ENCOUNTER (OUTPATIENT)
Dept: PHYSICAL THERAPY | Facility: CLINIC | Age: 63
Discharge: HOME | End: 2023-04-18
Payer: COMMERCIAL

## 2023-04-18 DIAGNOSIS — R42 DIZZINESS: ICD-10-CM

## 2023-04-18 DIAGNOSIS — H81.10 BENIGN PAROXYSMAL POSITIONAL VERTIGO, UNSPECIFIED LATERALITY: Primary | ICD-10-CM

## 2023-04-18 PROCEDURE — 95992 CANALITH REPOSITIONING PROC: CPT | Mod: GP

## 2023-05-02 ENCOUNTER — HOSPITAL ENCOUNTER (OUTPATIENT)
Dept: PHYSICAL THERAPY | Facility: CLINIC | Age: 63
Discharge: HOME | End: 2023-05-02
Payer: COMMERCIAL

## 2023-05-02 DIAGNOSIS — R42 DIZZINESS: ICD-10-CM

## 2023-05-02 DIAGNOSIS — H81.10 BENIGN PAROXYSMAL POSITIONAL VERTIGO, UNSPECIFIED LATERALITY: Primary | ICD-10-CM

## 2023-05-02 PROCEDURE — 97750 PHYSICAL PERFORMANCE TEST: CPT | Mod: GP

## 2023-05-02 NOTE — PROGRESS NOTES
[facility]    Outpatient Physical Therapy Discharge Note  Patient: Carina Chaney  : 1960    Beginning/End Dates of Reporting Period:  3/31/23 to 23    Referring Provider: Radha Jaffe MD    Therapy Diagnosis: Benign paroxysmal positional vertigo, unspecified laterality     Client Self Report: Functionally feeling no dizziness or off balance. Planning on following up with MD regarding her calcium and vitamin D levels to see if this could help prevent it from happening again.    Objective Measurements:  Objective Measure: DHI  Details: 0/100  Objective Measure: BPPV  Details: no s/s of BPPV functionally at home (denied re-test today)  Objective Measure: 4 item DGI  Details:  4 item DGI                        Outcome Measures (most recent score):  Dizziness Handicap Inventory (score out of 100) A decrease in score by 17.18 or greater indicates a clinically significant change in symptoms.: 0    Goals:  Goal Identifier DHI   Goal Description Pt to report significant decline in dizziness per DHI scoring </= 12/100 to be considered low perception of handicap.   Target Date 23   Date Met  23   Progress (detail required for progress note): eval: 30/100; MET /: 0/100     Goal Identifier BPPV   Goal Description Pt will demonstrate (-) s/s of BPPV throughout positional testing of all canals without limitations in bed mobility and transfers d/t dizziness   Target Date 23   Date Met  23   Progress (detail required for progress note): eval: L posterior canalithiasis (apogeotropic) OR L anterior canalithiasis; MET /: no s/s of BPPV functionally at home (denied re-test today)     Goal Identifier Head turns with walking   Goal Description Patient will be able to perform head turns while at rest and walking without feeling dizzy or off balance to perform household  tasks with greater safety.   Target Date 06/28/23   Date Met  05/02/23   Progress (detail required for progress note): eval: 10/12 on 4 item DGI; MET 5/2: 12/12 4 item DGI     Goal Identifier     Goal Description     Target Date     Date Met      Progress (detail required for progress note):       Goal Identifier     Goal Description     Target Date     Date Met      Progress (detail required for progress note):       Goal Identifier     Goal Description     Target Date     Date Met      Progress (detail required for progress note):       Goal Identifier     Goal Description     Target Date     Date Met      Progress (detail required for progress note):       Goal Identifier     Goal Description     Target Date     Date Met      Progress (detail required for progress note):         Plan:  Discharge from therapy.    Discharge:    Reason for Discharge: Patient has met all goals and is functionally performing all prior activities.    Equipment Issued: n/a    Discharge Plan: Pt to follow up with MD regarding calcium and vitamin D levels in order to help potentially prevent reoccurrence of BPPV.

## 2023-08-29 ENCOUNTER — VIRTUAL VISIT (OUTPATIENT)
Dept: FAMILY MEDICINE | Facility: CLINIC | Age: 63
End: 2023-08-29
Payer: COMMERCIAL

## 2023-08-29 DIAGNOSIS — J22 LOWER RESPIRATORY TRACT INFECTION: Primary | ICD-10-CM

## 2023-08-29 PROCEDURE — 99213 OFFICE O/P EST LOW 20 MIN: CPT | Mod: VID | Performed by: PHYSICIAN ASSISTANT

## 2023-08-29 RX ORDER — ALBUTEROL SULFATE 1.25 MG/3ML
1.25 SOLUTION RESPIRATORY (INHALATION) EVERY 6 HOURS PRN
COMMUNITY
End: 2023-10-03

## 2023-08-29 RX ORDER — DOXYCYCLINE HYCLATE 100 MG
100 TABLET ORAL 2 TIMES DAILY
Qty: 20 TABLET | Refills: 0 | Status: SHIPPED | OUTPATIENT
Start: 2023-08-29 | End: 2023-09-08

## 2023-08-29 RX ORDER — ALBUTEROL SULFATE 90 UG/1
2 AEROSOL, METERED RESPIRATORY (INHALATION) EVERY 6 HOURS PRN
COMMUNITY
End: 2023-08-29

## 2023-08-29 RX ORDER — ALBUTEROL SULFATE 90 UG/1
2 AEROSOL, METERED RESPIRATORY (INHALATION) EVERY 6 HOURS PRN
Qty: 18 G | Refills: 1 | Status: SHIPPED | OUTPATIENT
Start: 2023-08-29 | End: 2023-10-03

## 2023-08-29 RX ORDER — METHYLPREDNISOLONE 4 MG
TABLET, DOSE PACK ORAL
Qty: 21 TABLET | Refills: 0 | Status: SHIPPED | OUTPATIENT
Start: 2023-08-29 | End: 2023-10-03

## 2023-08-29 ASSESSMENT — ASTHMA QUESTIONNAIRES: ACT_TOTALSCORE: 13

## 2023-08-29 NOTE — PROGRESS NOTES
Carina is a 63 year old who is being evaluated via a billable video visit.      How would you like to obtain your AVS? MyChart  If the video visit is dropped, the invitation should be resent by: Text to cell phone: [phone]  Will anyone else be joining your video visit?       1. Lower respiratory tract infection  Recommend starting doxycyline for ongoing respiratory infection, she would also benefit from medrol for wheezing.  Follow up if new or worsening symptoms  - doxycycline hyclate (VIBRA-TABS) 100 MG tablet; Take 1 tablet (100 mg) by mouth 2 times daily for 10 days  Dispense: 20 tablet; Refill: 0  - methylPREDNISolone (MEDROL DOSEPAK) 4 MG tablet therapy pack; Follow Package Directions  Dispense: 21 tablet; Refill: 0  - albuterol (PROAIR HFA/PROVENTIL HFA/VENTOLIN HFA) 108 (90 Base) MCG/ACT inhaler; Inhale 2 puffs into the lungs every 6 hours as needed for shortness of breath, wheezing or cough  Dispense: 18 g; Refill: 1        Subjective   Carina is a 63 year old, presenting for the following health issues:  URI        8/29/2023     2:11 PM   Additional Questions   Roomed by Marisela CHANEY    History of Present Illness       Reason for visit:  Cold/bronchitis onset Aug 28th - reocurring  Symptom onset:  1-3 days ago  Symptoms include:  Runny nose, phlegm producing cough, sore throat (day 1)  Symptom intensity:  Severe  Symptom progression:  Worsening  Had these symptoms before:  Yes  Has tried/received treatment for these symptoms:  Yes  Previous treatment was successful:  Yes  Prior treatment description:  Benzonatate and antibiotics  What makes it worse:  Cold environments - sitting in front of A/C  What makes it better:  Hot fluids, cold fluids & Chinese medicines    She eats 2-3 servings of fruits and vegetables daily.She consumes 1 sweetened beverage(s) daily.She exercises with enough effort to increase her heart rate 30 to 60 minutes per day.  She exercises with enough effort to increase her heart  rate 5 days per week.   She is taking medications regularly.     Is taking OTC meds, and teas.     Productive cough.  Began 08/10-8/21 and then resolved, feeling well from 8/21-8/28 but then became ill again. Currently experiencing increased wheezing, deep, persistent cough and nasal congestion. Has been requiring albuterol 2-3 times daily         Review of Systems   Constitutional, HEENT, cardiovascular, pulmonary, GI, , musculoskeletal, neuro, skin, endocrine and psych systems are negative, except as otherwise noted.      Objective           Vitals:  No vitals were obtained today due to virtual visit.    Physical Exam   GENERAL: Healthy, alert and no distress  EYES: Eyes grossly normal to inspection.  No discharge or erythema, or obvious scleral/conjunctival abnormalities.  RESP: audible wheeze present, persisting coughing noted, deep and barky,  No visible retractions or increased work of breathing.    SKIN: Visible skin clear. No significant rash, abnormal pigmentation or lesions.  NEURO: Cranial nerves grossly intact.  Mentation and speech appropriate for age.  PSYCH: Mentation appears normal, affect normal/bright, judgement and insight intact, normal speech and appearance well-groomed.          Video-Visit Details    Type of service:  Video Visit     Originating Location (pt. Location): Home    Distant Location (provider location):  On-site  Platform used for Video Visit: ToñoWell

## 2023-08-30 ENCOUNTER — MYC MEDICAL ADVICE (OUTPATIENT)
Dept: FAMILY MEDICINE | Facility: CLINIC | Age: 63
End: 2023-08-30
Payer: COMMERCIAL

## 2023-08-30 NOTE — TELEPHONE ENCOUNTER
See MyChart from patient needing provider review.   Please write back directly to pt or advise if clinic follow up needed.     Lou MARIO, RN  [facility] RN Triage Team

## 2023-08-31 NOTE — TELEPHONE ENCOUNTER
Pt just seen by Julio JENKINS for her current concerns. Routing to her for further recommendations.     Thank you ,  Suzie Hernandez MD on 8/31/2023

## 2023-10-02 ASSESSMENT — ASTHMA QUESTIONNAIRES: ACT_TOTALSCORE: 25

## 2023-10-02 NOTE — COMMUNITY RESOURCES LIST (ENGLISH)
10/02/2023   [facility]  N/A  For questions about this resource list or additional care needs, please contact your primary care clinic or care manager.  Phone: [phone]   Email: N/A   Address: [facility] [address] [place] [address]   Hours: N/A        Food and Nutrition       Food pantry  1  [employer] ([employer]) Distance: 1 miles      In-Person   [address] [place] [address]  Language: English  Hours: Mon - Sun Open 24 Hours  Fees: Free   Phone: (507) 838-7933 Email: [email or web address] Website: http://www.[employer] [facility].[employer]     2  [facility] [facility] Distance: 2.32 miles      Pickup   [address] [place] [address]  Language: English, [place]  Hours: Fri 10:00 AM - 1:00 PM  Fees: Self Pay   Phone: (819) 270-6498 Email: [facility] Website: https://www.[facility]/[email or web address]-nmsshw     SNAP application assistance  3  [facility] Distance: 2.46 miles      In-Person, Phone/Virtual   [address] [place] [address]  Language: English, [place]  Hours: Mon - Fri 9:00 AM - 4:30 PM  Fees: Free   Phone: (432) 979-6483 Ext.113 Email: info@[facility].org Website: https://"[employer].org     4  [employer] [facility] Distance: 6.85 miles      In-Person   [address] [place] [address]  Language: English, [place]  Hours: Mon - Fri 8:30 AM - 5:00 PM  Fees: Free   Phone: (383) 299-1873 Email: info@[employer].org Website: http://www.cl[employer].org/     Soup kitchen or free meals  5  [employer] [employer] Distance: 0.9 miles      Pickup   [address] [place] [address]  Language: English  Hours: Sat 5:00 PM - 7:00 PM , Sun 5:30 PM - 6:30 PM  Fees: Free   Phone: (920) 822-5317 Email: office@[facility] Website: http://[facility]/     6  [facility] Distance: 1.37  [facility]   [address] [place] [address]  Language: English  Hours: Mon - Fri 5:00 PM - 6:00 PM  Fees: Free   Phone: (737) 375-8216 Email: contactus@[employer] Website: https://www.[employer].org/          Important Numbers & Websites       Emergency Services   911  [facility] Services   311  Poison Control   (634) 552-3599  Suicide Prevention Lifeline   (355) 388-7225 (TALK)  Child Abuse Hotline   (707) 746-9076 (4-A-Child)  Sexual Assault Hotline   (123) 939-1868 (HOPE)  National Runaway Safeline   (927) 752-8908 (RUNAWAY)  All-Options Talkline   (935) 522-1612  Substance Abuse Referral   (505) 457-1150 (HELP)

## 2023-10-03 ENCOUNTER — OFFICE VISIT (OUTPATIENT)
Dept: FAMILY MEDICINE | Facility: CLINIC | Age: 63
End: 2023-10-03
Payer: COMMERCIAL

## 2023-10-03 VITALS
OXYGEN SATURATION: 99 % | RESPIRATION RATE: 15 BRPM | HEIGHT: 65 IN | DIASTOLIC BLOOD PRESSURE: 77 MMHG | WEIGHT: 142 LBS | HEART RATE: 66 BPM | TEMPERATURE: 97.3 F | BODY MASS INDEX: 23.66 KG/M2 | SYSTOLIC BLOOD PRESSURE: 131 MMHG

## 2023-10-03 DIAGNOSIS — L30.9 DERMATITIS: Primary | ICD-10-CM

## 2023-10-03 DIAGNOSIS — T63.441A BEE STING REACTION, ACCIDENTAL OR UNINTENTIONAL, INITIAL ENCOUNTER: ICD-10-CM

## 2023-10-03 PROCEDURE — 99214 OFFICE O/P EST MOD 30 MIN: CPT | Performed by: NURSE PRACTITIONER

## 2023-10-03 RX ORDER — HYDROXYZINE HYDROCHLORIDE 25 MG/1
12.5-25 TABLET, FILM COATED ORAL 3 TIMES DAILY PRN
Qty: 30 TABLET | Refills: 0 | Status: ON HOLD | OUTPATIENT
Start: 2023-10-03 | End: 2023-10-24

## 2023-10-03 RX ORDER — HYDROCORTISONE 2.5 %
CREAM (GRAM) TOPICAL 2 TIMES DAILY
Qty: 20 G | Refills: 1 | Status: ON HOLD | OUTPATIENT
Start: 2023-10-03 | End: 2023-10-24

## 2023-10-03 ASSESSMENT — PAIN SCALES - GENERAL: PAINLEVEL: NO PAIN (0)

## 2023-10-03 NOTE — PROGRESS NOTES
"  Assessment & Plan     Dermatitis  Bee sting reaction, accidental or unintentional, initial encounter  No s/sx infection; localized to wrist; no stinger present; continue conservative tx; avoid scratching;   - hydrOXYzine (ATARAX) 25 MG tablet  - hydrocortisone 2.5 % cream      There are no Patient Instructions on file for this visit.    DONAVAN Eli CNP  [facility] PALMER iLm is a 63 year old, presenting for the following health issues:  Insect Bites (Bee Sting )        10/3/2023    11:20 AM   Additional Questions   Roomed by Meghna Phoenix       History of Present Illness       Reason for visit:  Bee sting still itching after 4 days, stinger may still be in wrist.  Symptom onset:  3-7 days ago  Symptoms include:  Itching at the site of the sting  Symptom intensity:  Moderate  Symptom progression:  Staying the same  Had these symptoms before:  No  What makes it worse:  No, the itching randomly begins and then I can't stop for a few seconds.  What makes it better:  Ice on site    She eats 2-3 servings of fruits and vegetables daily.She consumes 1 sweetened beverage(s) daily.She exercises with enough effort to increase her heart rate 30 to 60 minutes per day.  She exercises with enough effort to increase her heart rate 5 days per week.   She is taking medications regularly.                 Review of Systems         Objective    /77 (BP Location: Right arm, Patient Position: Sitting, Cuff Size: Adult Regular)   Pulse 66   Temp 97.3  F (36.3  C) (Temporal)   Resp 15   Ht 1.651 m (5' 5\")   Wt 64.4 kg (142 lb)   LMP  (LMP Unknown)   SpO2 99%   BMI 23.63 kg/m    Body mass index is 23.63 kg/m .  Physical Exam                         "

## 2023-10-03 NOTE — COMMUNITY RESOURCES LIST (ENGLISH)
10/03/2023   [facility]  N/A  For questions about this resource list or additional care needs, please contact your primary care clinic or care manager.  Phone: [phone]   Email: N/A   Address: [facility] [place] [address]   Hours: N/A        Food and Nutrition       Food pantry  1  [employer] (MR[employer]) Distance: 1 miles      In-Person   [address] [place] [address] [place] [address]  Language: English  Hours: Mon - Sun Open 24 Hours  Fees: Free   Phone: (647) 518-4551 Email: mrd.363days@mii.[email or web address]¢ Network Website: http://www.[facility].[employer] I/O     2  [facility] Distance: 2.32 miles      Pickup   [address] [place] [address]  Language: English, Ivorian  Hours: Fri 10:00 AM - 1:00 PM  Fees: Self Pay   Phone: (528) 585-1123 Email: [facility] Website: https://www.[facility]/jdqfspjjg-xvqwwyami-rwrakl     SNAP application assistance  3  [facility] Distance: 2.46 miles      In-Person, Phone/Virtual   [address] [place] [address]  Language: English, Ivorian  Hours: Mon - Fri 9:00 AM - 4:30 PM  Fees: Free   Phone: (308) 903-2874 Ext.113 Email: info@[facility].org Website: https://[employer].org     4  [employer] [facility] Distance: 6.85 miles      In-Person   [address] [place] [address]  Language: English, Ivorian  Hours: Mon - Fri 8:30 AM - 5:00 PM  Fees: Free   Phone: (115) 833-2642 Email: info@[employer].org Website: http://www.clues.org/     Soup kitchen or free meals  5  [employer] Distance: 0.9 miles      Pickup   [address] [place] [address]  Language: English  Hours: Sat 5:00 PM - 7:00 PM , Sun 5:30 PM - 6:30 PM  Fees: Free   Phone: (160) 899-3596 Email: office@[facility] Website: http://[facility]/     6  [facility] Distance: 1.37  [facility]   [address] [place] [address]  Language: English  Hours: Mon - Fri 5:00 PM - 6:00 PM  Fees: Free   Phone: (603) 547-1747 Email: contactus@[employer] Website: https://www.[employer].org/          Important Numbers & Websites       Emergency Services   911  [facility]   311  Poison Control   (104) 982-4533  Suicide Prevention Lifeline   (528) 797-8557 (TALK)  Child Abuse Hotline   (715) 481-7820 (4-A-Child)  Sexual Assault Hotline   (681) 125-4288 (HOPE)  National Runaway Safeline   (257) 980-5446 (RUNAWAY)  All-Options Talkline   (712) 503-9711  Substance Abuse Referral   (156) 405-4422 (HELP)

## 2023-10-19 ENCOUNTER — PATIENT OUTREACH (OUTPATIENT)
Dept: CARE COORDINATION | Facility: CLINIC | Age: 63
End: 2023-10-19
Payer: COMMERCIAL

## 2023-10-21 ENCOUNTER — APPOINTMENT (OUTPATIENT)
Dept: CT IMAGING | Facility: CLINIC | Age: 63
DRG: 392 | End: 2023-10-21
Attending: EMERGENCY MEDICINE
Payer: COMMERCIAL

## 2023-10-21 ENCOUNTER — E-VISIT (OUTPATIENT)
Dept: URGENT CARE | Facility: CLINIC | Age: 63
End: 2023-10-21
Payer: COMMERCIAL

## 2023-10-21 ENCOUNTER — HOSPITAL ENCOUNTER (INPATIENT)
Facility: CLINIC | Age: 63
LOS: 1 days | Discharge: HOME | DRG: 392 | End: 2023-10-24
Attending: EMERGENCY MEDICINE | Admitting: INTERNAL MEDICINE
Payer: COMMERCIAL

## 2023-10-21 DIAGNOSIS — Z87.19 HX OF CROHN'S DISEASE: ICD-10-CM

## 2023-10-21 DIAGNOSIS — R10.32 LLQ ABDOMINAL PAIN: ICD-10-CM

## 2023-10-21 DIAGNOSIS — K52.9 PROCTOCOLITIS: Primary | ICD-10-CM

## 2023-10-21 DIAGNOSIS — N39.0 ACUTE UTI (URINARY TRACT INFECTION): Primary | ICD-10-CM

## 2023-10-21 DIAGNOSIS — R31.29 MICROSCOPIC HEMATURIA: ICD-10-CM

## 2023-10-21 DIAGNOSIS — R11.2 NAUSEA AND VOMITING, UNSPECIFIED VOMITING TYPE: ICD-10-CM

## 2023-10-21 LAB
ALBUMIN SERPL BCG-MCNC: 4.8 G/DL (ref 3.5–5.2)
ALBUMIN UR-MCNC: 30 MG/DL
ALP SERPL-CCNC: 97 U/L (ref 35–104)
ALT SERPL W P-5'-P-CCNC: 19 U/L (ref 0–50)
ANION GAP SERPL CALCULATED.3IONS-SCNC: 17 MMOL/L (ref 7–15)
APPEARANCE UR: CLEAR
AST SERPL W P-5'-P-CCNC: 27 U/L (ref 0–45)
BASO+EOS+MONOS # BLD AUTO: NORMAL 10*3/UL
BASO+EOS+MONOS NFR BLD AUTO: NORMAL %
BASOPHILS # BLD AUTO: 0 10E3/UL (ref 0–0.2)
BASOPHILS NFR BLD AUTO: 0 %
BILIRUB DIRECT SERPL-MCNC: <0.2 MG/DL (ref 0–0.3)
BILIRUB SERPL-MCNC: 0.6 MG/DL
BILIRUB UR QL STRIP: NEGATIVE
BUN SERPL-MCNC: 11.9 MG/DL (ref 8–23)
CALCIUM SERPL-MCNC: 10.2 MG/DL (ref 8.8–10.2)
CHLORIDE SERPL-SCNC: 99 MMOL/L (ref 98–107)
COLOR UR AUTO: ABNORMAL
CREAT SERPL-MCNC: 0.57 MG/DL (ref 0.51–0.95)
DEPRECATED HCO3 PLAS-SCNC: 23 MMOL/L (ref 22–29)
EGFRCR SERPLBLD CKD-EPI 2021: >90 ML/MIN/1.73M2
EOSINOPHIL # BLD AUTO: 0 10E3/UL (ref 0–0.7)
EOSINOPHIL NFR BLD AUTO: 0 %
ERYTHROCYTE [DISTWIDTH] IN BLOOD BY AUTOMATED COUNT: 11.7 % (ref 10–15)
GLUCOSE SERPL-MCNC: 148 MG/DL (ref 70–99)
GLUCOSE UR STRIP-MCNC: NEGATIVE MG/DL
HCT VFR BLD AUTO: 41.5 % (ref 35–47)
HGB BLD-MCNC: 13.6 G/DL (ref 11.7–15.7)
HGB UR QL STRIP: ABNORMAL
HOLD SPECIMEN: NORMAL
HOLD SPECIMEN: NORMAL
IMM GRANULOCYTES # BLD: 0 10E3/UL
IMM GRANULOCYTES NFR BLD: 0 %
KETONES UR STRIP-MCNC: 40 MG/DL
LEUKOCYTE ESTERASE UR QL STRIP: ABNORMAL
LIPASE SERPL-CCNC: 22 U/L (ref 13–60)
LYMPHOCYTES # BLD AUTO: 1 10E3/UL (ref 0.8–5.3)
LYMPHOCYTES NFR BLD AUTO: 10 %
MCH RBC QN AUTO: 30.3 PG (ref 26.5–33)
MCHC RBC AUTO-ENTMCNC: 32.8 G/DL (ref 31.5–36.5)
MCV RBC AUTO: 92 FL (ref 78–100)
MONOCYTES # BLD AUTO: 0.3 10E3/UL (ref 0–1.3)
MONOCYTES NFR BLD AUTO: 3 %
MUCOUS THREADS #/AREA URNS LPF: PRESENT /LPF
NEUTROPHILS # BLD AUTO: 8.1 10E3/UL (ref 1.6–8.3)
NEUTROPHILS NFR BLD AUTO: 87 %
NITRATE UR QL: NEGATIVE
NRBC # BLD AUTO: 0 10E3/UL
NRBC BLD AUTO-RTO: 0 /100
PH UR STRIP: 7 [PH] (ref 5–7)
PLATELET # BLD AUTO: 263 10E3/UL (ref 150–450)
POTASSIUM SERPL-SCNC: 3.4 MMOL/L (ref 3.4–5.3)
PROT SERPL-MCNC: 7.9 G/DL (ref 6.4–8.3)
RBC # BLD AUTO: 4.49 10E6/UL (ref 3.8–5.2)
RBC URINE: 10 /HPF
SODIUM SERPL-SCNC: 139 MMOL/L (ref 135–145)
SP GR UR STRIP: 1.02 (ref 1–1.03)
SQUAMOUS EPITHELIAL: <1 /HPF
UROBILINOGEN UR STRIP-MCNC: NORMAL MG/DL
WBC # BLD AUTO: 9.3 10E3/UL (ref 4–11)
WBC URINE: 3 /HPF

## 2023-10-21 PROCEDURE — 85025 COMPLETE CBC W/AUTO DIFF WBC: CPT | Performed by: EMERGENCY MEDICINE

## 2023-10-21 PROCEDURE — 81001 URINALYSIS AUTO W/SCOPE: CPT | Performed by: STUDENT IN AN ORGANIZED HEALTH CARE EDUCATION/TRAINING PROGRAM

## 2023-10-21 PROCEDURE — 96376 TX/PRO/DX INJ SAME DRUG ADON: CPT

## 2023-10-21 PROCEDURE — 250N000011 HC RX IP 250 OP 636: Mod: JZ

## 2023-10-21 PROCEDURE — G0378 HOSPITAL OBSERVATION PER HR: HCPCS

## 2023-10-21 PROCEDURE — 99207 PR NON-BILLABLE SERV PER CHARTING: CPT | Performed by: FAMILY MEDICINE

## 2023-10-21 PROCEDURE — 250N000011 HC RX IP 250 OP 636: Mod: JZ | Performed by: EMERGENCY MEDICINE

## 2023-10-21 PROCEDURE — 81001 URINALYSIS AUTO W/SCOPE: CPT | Performed by: EMERGENCY MEDICINE

## 2023-10-21 PROCEDURE — 83690 ASSAY OF LIPASE: CPT | Performed by: EMERGENCY MEDICINE

## 2023-10-21 PROCEDURE — 82248 BILIRUBIN DIRECT: CPT | Performed by: EMERGENCY MEDICINE

## 2023-10-21 PROCEDURE — 80053 COMPREHEN METABOLIC PANEL: CPT | Performed by: STUDENT IN AN ORGANIZED HEALTH CARE EDUCATION/TRAINING PROGRAM

## 2023-10-21 PROCEDURE — 258N000003 HC RX IP 258 OP 636: Performed by: EMERGENCY MEDICINE

## 2023-10-21 PROCEDURE — 99223 1ST HOSP IP/OBS HIGH 75: CPT | Performed by: INTERNAL MEDICINE

## 2023-10-21 PROCEDURE — 96361 HYDRATE IV INFUSION ADD-ON: CPT

## 2023-10-21 PROCEDURE — 250N000011 HC RX IP 250 OP 636: Performed by: EMERGENCY MEDICINE

## 2023-10-21 PROCEDURE — 258N000003 HC RX IP 258 OP 636: Performed by: STUDENT IN AN ORGANIZED HEALTH CARE EDUCATION/TRAINING PROGRAM

## 2023-10-21 PROCEDURE — 80053 COMPREHEN METABOLIC PANEL: CPT | Performed by: EMERGENCY MEDICINE

## 2023-10-21 PROCEDURE — 86140 C-REACTIVE PROTEIN: CPT | Performed by: INTERNAL MEDICINE

## 2023-10-21 PROCEDURE — 99285 EMERGENCY DEPT VISIT HI MDM: CPT | Mod: 25

## 2023-10-21 PROCEDURE — 250N000009 HC RX 250: Performed by: EMERGENCY MEDICINE

## 2023-10-21 PROCEDURE — 83690 ASSAY OF LIPASE: CPT | Performed by: STUDENT IN AN ORGANIZED HEALTH CARE EDUCATION/TRAINING PROGRAM

## 2023-10-21 PROCEDURE — 82248 BILIRUBIN DIRECT: CPT | Performed by: STUDENT IN AN ORGANIZED HEALTH CARE EDUCATION/TRAINING PROGRAM

## 2023-10-21 PROCEDURE — 74177 CT ABD & PELVIS W/CONTRAST: CPT

## 2023-10-21 PROCEDURE — 80307 DRUG TEST PRSMV CHEM ANLYZR: CPT | Performed by: INTERNAL MEDICINE

## 2023-10-21 PROCEDURE — 96374 THER/PROPH/DIAG INJ IV PUSH: CPT | Mod: 59

## 2023-10-21 PROCEDURE — 36415 COLL VENOUS BLD VENIPUNCTURE: CPT | Performed by: STUDENT IN AN ORGANIZED HEALTH CARE EDUCATION/TRAINING PROGRAM

## 2023-10-21 PROCEDURE — 85025 COMPLETE CBC W/AUTO DIFF WBC: CPT | Performed by: STUDENT IN AN ORGANIZED HEALTH CARE EDUCATION/TRAINING PROGRAM

## 2023-10-21 RX ORDER — ONDANSETRON 2 MG/ML
4 INJECTION INTRAMUSCULAR; INTRAVENOUS EVERY 6 HOURS PRN
Status: DISCONTINUED | OUTPATIENT
Start: 2023-10-21 | End: 2023-10-24 | Stop reason: HOSPADM

## 2023-10-21 RX ORDER — OXYCODONE HYDROCHLORIDE 5 MG/1
5 TABLET ORAL EVERY 6 HOURS PRN
Status: DISCONTINUED | OUTPATIENT
Start: 2023-10-21 | End: 2023-10-24 | Stop reason: HOSPADM

## 2023-10-21 RX ORDER — PROCHLORPERAZINE MALEATE 10 MG
10 TABLET ORAL EVERY 6 HOURS PRN
Status: DISCONTINUED | OUTPATIENT
Start: 2023-10-21 | End: 2023-10-24 | Stop reason: HOSPADM

## 2023-10-21 RX ORDER — ONDANSETRON 2 MG/ML
INJECTION INTRAMUSCULAR; INTRAVENOUS
Status: COMPLETED
Start: 2023-10-21 | End: 2023-10-21

## 2023-10-21 RX ORDER — WATER 10 ML/10ML
INJECTION INTRAMUSCULAR; INTRAVENOUS; SUBCUTANEOUS
Status: DISCONTINUED
Start: 2023-10-21 | End: 2023-10-22 | Stop reason: HOSPADM

## 2023-10-21 RX ORDER — IOPAMIDOL 755 MG/ML
72 INJECTION, SOLUTION INTRAVASCULAR ONCE
Status: COMPLETED | OUTPATIENT
Start: 2023-10-21 | End: 2023-10-21

## 2023-10-21 RX ORDER — ONDANSETRON 4 MG/1
4 TABLET, ORALLY DISINTEGRATING ORAL EVERY 6 HOURS PRN
Status: DISCONTINUED | OUTPATIENT
Start: 2023-10-21 | End: 2023-10-24 | Stop reason: HOSPADM

## 2023-10-21 RX ORDER — SULFAMETHOXAZOLE/TRIMETHOPRIM 800-160 MG
1 TABLET ORAL 2 TIMES DAILY
Qty: 6 TABLET | Refills: 0 | Status: ON HOLD | OUTPATIENT
Start: 2023-10-21 | End: 2023-10-22

## 2023-10-21 RX ORDER — ACETAMINOPHEN 325 MG/1
650 TABLET ORAL EVERY 6 HOURS PRN
Status: DISCONTINUED | OUTPATIENT
Start: 2023-10-21 | End: 2023-10-24 | Stop reason: HOSPADM

## 2023-10-21 RX ORDER — KETOROLAC TROMETHAMINE 15 MG/ML
30 INJECTION, SOLUTION INTRAMUSCULAR; INTRAVENOUS EVERY 6 HOURS PRN
Status: DISCONTINUED | OUTPATIENT
Start: 2023-10-21 | End: 2023-10-24 | Stop reason: HOSPADM

## 2023-10-21 RX ORDER — ACETAMINOPHEN 650 MG/1
650 SUPPOSITORY RECTAL EVERY 6 HOURS PRN
Status: DISCONTINUED | OUTPATIENT
Start: 2023-10-21 | End: 2023-10-24 | Stop reason: HOSPADM

## 2023-10-21 RX ORDER — OLANZAPINE 10 MG/1
2.5 INJECTION, POWDER, LYOPHILIZED, FOR SOLUTION INTRAMUSCULAR EVERY 30 MIN PRN
Status: COMPLETED | OUTPATIENT
Start: 2023-10-21 | End: 2023-10-21

## 2023-10-21 RX ORDER — PROCHLORPERAZINE 25 MG
25 SUPPOSITORY, RECTAL RECTAL EVERY 12 HOURS PRN
Status: DISCONTINUED | OUTPATIENT
Start: 2023-10-21 | End: 2023-10-24 | Stop reason: HOSPADM

## 2023-10-21 RX ADMIN — IOPAMIDOL 72 ML: 755 INJECTION, SOLUTION INTRAVENOUS at 21:55

## 2023-10-21 RX ADMIN — OLANZAPINE 2.5 MG: 10 INJECTION, POWDER, LYOPHILIZED, FOR SOLUTION INTRAMUSCULAR at 22:51

## 2023-10-21 RX ADMIN — SODIUM CHLORIDE 61 ML: 9 INJECTION, SOLUTION INTRAVENOUS at 21:55

## 2023-10-21 RX ADMIN — SODIUM CHLORIDE 1000 ML: 9 INJECTION, SOLUTION INTRAVENOUS at 18:44

## 2023-10-21 RX ADMIN — SODIUM CHLORIDE 1000 ML: 9 INJECTION, SOLUTION INTRAVENOUS at 21:17

## 2023-10-21 RX ADMIN — OLANZAPINE 2.5 MG: 10 INJECTION, POWDER, LYOPHILIZED, FOR SOLUTION INTRAMUSCULAR at 21:17

## 2023-10-21 RX ADMIN — ONDANSETRON 4 MG: 2 INJECTION INTRAMUSCULAR; INTRAVENOUS at 18:44

## 2023-10-21 ASSESSMENT — ACTIVITIES OF DAILY LIVING (ADL)
ADLS_ACUITY_SCORE: 35
ADLS_ACUITY_SCORE: 37

## 2023-10-21 NOTE — ED TRIAGE NOTES
Patient has a history of Chron's disease, believes that she has a UTI, has been vomiting since 1:00 am this morning.     Triage Assessment (Adult)       Row Name 10/21/23 7807          Triage Assessment    Airway WDL WDL        Respiratory WDL    Respiratory WDL WDL        Skin Circulation/Temperature WDL    Skin Circulation/Temperature WDL WDL        Cardiac WDL    Cardiac WDL WDL        Peripheral/Neurovascular WDL    Peripheral Neurovascular WDL WDL        Cognitive/Neuro/Behavioral WDL    Cognitive/Neuro/Behavioral WDL WDL

## 2023-10-21 NOTE — PATIENT INSTRUCTIONS
Dear Carina Chaney    After reviewing your responses, I've been able to diagnose you with a urinary tract infection, which is a common infection of the bladder with bacteria.  This is not a sexually transmitted infection, though urinating immediately after intercourse can help prevent infections.  Drinking lots of fluids is also helpful to clear your current infection and prevent the next one.      I have sent a prescription for antibiotics to your pharmacy to treat this infection.    It is important that you take all of your prescribed medication even if your symptoms are improving after a few doses.  Taking all of your medicine helps prevent the symptoms from returning.     If your symptoms worsen, you develop pain in your back or stomach, develop fevers, or are not improving in 5 days, please contact your primary care provider for an appointment or visit any of our convenient Walk-in or Urgent Care Centers to be seen, which can be found on our website here.    Thanks again for choosing us as your health care partner,    Jessica Colon MD

## 2023-10-22 LAB
AMPHETAMINES UR QL SCN: ABNORMAL
ANION GAP SERPL CALCULATED.3IONS-SCNC: 10 MMOL/L (ref 7–15)
BARBITURATES UR QL SCN: ABNORMAL
BASO+EOS+MONOS # BLD AUTO: ABNORMAL 10*3/UL
BASO+EOS+MONOS NFR BLD AUTO: ABNORMAL %
BASOPHILS # BLD AUTO: 0 10E3/UL (ref 0–0.2)
BASOPHILS NFR BLD AUTO: 0 %
BENZODIAZ UR QL SCN: ABNORMAL
BUN SERPL-MCNC: 8.7 MG/DL (ref 8–23)
BZE UR QL SCN: ABNORMAL
CALCIUM SERPL-MCNC: 9.1 MG/DL (ref 8.8–10.2)
CANNABINOIDS UR QL SCN: ABNORMAL
CHLORIDE SERPL-SCNC: 108 MMOL/L (ref 98–107)
CREAT SERPL-MCNC: 0.58 MG/DL (ref 0.51–0.95)
CRP SERPL-MCNC: <3 MG/L
DEPRECATED HCO3 PLAS-SCNC: 24 MMOL/L (ref 22–29)
EGFRCR SERPLBLD CKD-EPI 2021: >90 ML/MIN/1.73M2
EOSINOPHIL # BLD AUTO: 0 10E3/UL (ref 0–0.7)
EOSINOPHIL NFR BLD AUTO: 0 %
ERYTHROCYTE [DISTWIDTH] IN BLOOD BY AUTOMATED COUNT: 12.1 % (ref 10–15)
FENTANYL UR QL: ABNORMAL
GLUCOSE SERPL-MCNC: 136 MG/DL (ref 70–99)
HCT VFR BLD AUTO: 34.7 % (ref 35–47)
HGB BLD-MCNC: 11.4 G/DL (ref 11.7–15.7)
IMM GRANULOCYTES # BLD: 0 10E3/UL
IMM GRANULOCYTES NFR BLD: 0 %
LYMPHOCYTES # BLD AUTO: 1.5 10E3/UL (ref 0.8–5.3)
LYMPHOCYTES NFR BLD AUTO: 16 %
MCH RBC QN AUTO: 30.2 PG (ref 26.5–33)
MCHC RBC AUTO-ENTMCNC: 32.9 G/DL (ref 31.5–36.5)
MCV RBC AUTO: 92 FL (ref 78–100)
MONOCYTES # BLD AUTO: 0.9 10E3/UL (ref 0–1.3)
MONOCYTES NFR BLD AUTO: 10 %
NEUTROPHILS # BLD AUTO: 6.8 10E3/UL (ref 1.6–8.3)
NEUTROPHILS NFR BLD AUTO: 74 %
NRBC # BLD AUTO: 0 10E3/UL
NRBC BLD AUTO-RTO: 0 /100
OPIATES UR QL SCN: ABNORMAL
PCP QUAL URINE (ROCHE): ABNORMAL
PLATELET # BLD AUTO: 219 10E3/UL (ref 150–450)
POTASSIUM SERPL-SCNC: 3.5 MMOL/L (ref 3.4–5.3)
RBC # BLD AUTO: 3.78 10E6/UL (ref 3.8–5.2)
SODIUM SERPL-SCNC: 142 MMOL/L (ref 135–145)
WBC # BLD AUTO: 9.3 10E3/UL (ref 4–11)

## 2023-10-22 PROCEDURE — G0378 HOSPITAL OBSERVATION PER HR: HCPCS

## 2023-10-22 PROCEDURE — 96376 TX/PRO/DX INJ SAME DRUG ADON: CPT

## 2023-10-22 PROCEDURE — 250N000011 HC RX IP 250 OP 636: Mod: JZ | Performed by: INTERNAL MEDICINE

## 2023-10-22 PROCEDURE — 96361 HYDRATE IV INFUSION ADD-ON: CPT

## 2023-10-22 PROCEDURE — 258N000003 HC RX IP 258 OP 636: Performed by: INTERNAL MEDICINE

## 2023-10-22 PROCEDURE — 99232 SBSQ HOSP IP/OBS MODERATE 35: CPT | Performed by: INTERNAL MEDICINE

## 2023-10-22 PROCEDURE — 87040 BLOOD CULTURE FOR BACTERIA: CPT | Performed by: INTERNAL MEDICINE

## 2023-10-22 PROCEDURE — 80048 BASIC METABOLIC PNL TOTAL CA: CPT | Performed by: INTERNAL MEDICINE

## 2023-10-22 PROCEDURE — 85025 COMPLETE CBC W/AUTO DIFF WBC: CPT | Performed by: INTERNAL MEDICINE

## 2023-10-22 PROCEDURE — 96375 TX/PRO/DX INJ NEW DRUG ADDON: CPT

## 2023-10-22 PROCEDURE — 36415 COLL VENOUS BLD VENIPUNCTURE: CPT | Performed by: INTERNAL MEDICINE

## 2023-10-22 PROCEDURE — 250N000013 HC RX MED GY IP 250 OP 250 PS 637: Performed by: INTERNAL MEDICINE

## 2023-10-22 RX ORDER — SODIUM CHLORIDE, SODIUM LACTATE, POTASSIUM CHLORIDE, CALCIUM CHLORIDE 600; 310; 30; 20 MG/100ML; MG/100ML; MG/100ML; MG/100ML
INJECTION, SOLUTION INTRAVENOUS CONTINUOUS
Status: DISCONTINUED | OUTPATIENT
Start: 2023-10-22 | End: 2023-10-24 | Stop reason: HOSPADM

## 2023-10-22 RX ADMIN — OXYCODONE HYDROCHLORIDE 5 MG: 5 TABLET ORAL at 19:24

## 2023-10-22 RX ADMIN — KETOROLAC TROMETHAMINE 30 MG: 15 INJECTION, SOLUTION INTRAMUSCULAR; INTRAVENOUS at 01:33

## 2023-10-22 RX ADMIN — KETOROLAC TROMETHAMINE 30 MG: 15 INJECTION, SOLUTION INTRAMUSCULAR; INTRAVENOUS at 08:31

## 2023-10-22 RX ADMIN — KETOROLAC TROMETHAMINE 30 MG: 15 INJECTION, SOLUTION INTRAMUSCULAR; INTRAVENOUS at 14:45

## 2023-10-22 RX ADMIN — OXYCODONE HYDROCHLORIDE 5 MG: 5 TABLET ORAL at 12:10

## 2023-10-22 RX ADMIN — SODIUM CHLORIDE, POTASSIUM CHLORIDE, SODIUM LACTATE AND CALCIUM CHLORIDE: 600; 310; 30; 20 INJECTION, SOLUTION INTRAVENOUS at 18:04

## 2023-10-22 RX ADMIN — OXYCODONE HYDROCHLORIDE 5 MG: 5 TABLET ORAL at 06:08

## 2023-10-22 RX ADMIN — DEXTROSE AND SODIUM CHLORIDE: 5; 900 INJECTION, SOLUTION INTRAVENOUS at 01:32

## 2023-10-22 RX ADMIN — SODIUM CHLORIDE, POTASSIUM CHLORIDE, SODIUM LACTATE AND CALCIUM CHLORIDE: 600; 310; 30; 20 INJECTION, SOLUTION INTRAVENOUS at 08:38

## 2023-10-22 RX ADMIN — KETOROLAC TROMETHAMINE 30 MG: 15 INJECTION, SOLUTION INTRAMUSCULAR; INTRAVENOUS at 21:04

## 2023-10-22 RX ADMIN — ONDANSETRON 4 MG: 4 TABLET, ORALLY DISINTEGRATING ORAL at 01:33

## 2023-10-22 ASSESSMENT — ACTIVITIES OF DAILY LIVING (ADL)
ADLS_ACUITY_SCORE: 22
ADLS_ACUITY_SCORE: 20
ADLS_ACUITY_SCORE: 22
ADLS_ACUITY_SCORE: 37
ADLS_ACUITY_SCORE: 22

## 2023-10-22 NOTE — ED PROVIDER NOTES
"  History     Chief Complaint:  Nausea & Vomiting     HPI   Carina Chaney is a 63 year old female with history of chron's disease who presents with nausea and vomiting. Patient states she has been vomiting since 0100 this morning. She expresses she is not able to keep any liquids down. She describes her right sided abdominal pain as untenable. She notes that her chron's is located on her right side ileum. She also notes diarrhea that onset around 1000 this morning that has since resolved. Patient is not on any medication for her Chron's disease. Patient uses marijuana to alleviate her pain. She denies any bowel surgery or abdominal surgery.     Independent Historian:   Friend in room who endorses history.    Review of External Notes:   Reviewed primary care visit from 6/27/2016.  Longstanding history of Crohn's disease.  Follows with Dr. Odell in the outpatient setting.  Crohn's flares every 10 years but has not tolerated medications well and so currently uses marijuana.      Medications:    Hydroxyzine   Sulfamethoxazole-trimethoprim       Past Medical History:    Chron's diseases  Colitis  Reactive airway disease   Irritable bowel syndrome   deep venous thrombosis   Chronic fistula  GERD    Past Surgical History:    Tonsil and adenoidectomy  Colonoscopy x2  Refractive surgery  Anal procedure     Physical Exam   Patient Vitals for the past 24 hrs:   BP Temp Temp src Pulse Resp SpO2 Height Weight   10/22/23 0149 (!) 153/87 100.2  F (37.9  C) Oral 71 18 98 % -- --   10/21/23 2245 -- -- -- -- -- 99 % -- --   10/21/23 2117 139/77 99.3  F (37.4  C) Oral 74 18 99 % 1.676 m (5' 6\") 65.3 kg (144 lb)        Physical Exam  General: Alert, appears well-developed and well-nourished. Cooperative.     In moderate distress  HEENT:  Head:  Atraumatic  Ears:  External ears are normal  Mouth/Throat:  Oropharynx is without erythema or exudate and mucous membranes are dry.   Eyes:   Conjunctivae normal and EOM are normal. No scleral " icterus.  CV:  Normal rate, regular rhythm, normal heart sounds and radial pulses are 2+ and symmetric.  No murmur.  Resp:  Breath sounds are clear bilaterally    Non-labored, no retractions or accessory muscle use  GI:  Abdomen is soft, no distension, RLQ and LUQ tenderness. No rebound or guarding.  No CVA tenderness bilaterally  MS:  Normal range of motion. No edema.    Normal strength in all 4 extremities.     Back atraumatic.    No midline cervical, thoracic, or lumbar tenderness  Skin:  Warm and dry.  No rash or lesions noted.  Neuro:   Alert. Normal strength.  GCS: 15  Psych: Normal mood and affect.    Emergency Department Course   Imaging:  CT Abdomen Pelvis w Contrast   Final Result   IMPRESSION:    1.  Possible mild diffuse proctocolitis.    2.  Normal appendix.   3.  Nonacute findings, as detailed above.                 Laboratory:  Labs Ordered and Resulted from Time of ED Arrival to Time of ED Departure   COMPREHENSIVE METABOLIC PANEL - Abnormal       Result Value    Sodium 139      Potassium 3.4      Carbon Dioxide (CO2) 23      Anion Gap 17 (*)     Urea Nitrogen 11.9      Creatinine 0.57      GFR Estimate >90      Calcium 10.2      Chloride 99      Glucose 148 (*)     Alkaline Phosphatase 97      AST 27      ALT 19      Protein Total 7.9      Albumin 4.8      Bilirubin Total 0.6     ROUTINE UA WITH MICROSCOPIC REFLEX TO CULTURE - Abnormal    Color Urine Light Yellow      Appearance Urine Clear      Glucose Urine Negative      Bilirubin Urine Negative      Ketones Urine 40 (*)     Specific Gravity Urine 1.023      Blood Urine Small (*)     pH Urine 7.0      Protein Albumin Urine 30 (*)     Urobilinogen Urine Normal      Nitrite Urine Negative      Leukocyte Esterase Urine Trace (*)     Mucus Urine Present (*)     RBC Urine 10 (*)     WBC Urine 3      Squamous Epithelials Urine <1     LIPASE - Normal    Lipase 22     BILIRUBIN DIRECT - Normal    Bilirubin Direct <0.20     CRP INFLAMMATION - Normal    CRP  Inflammation <3.00     CBC WITH PLATELETS AND DIFFERENTIAL    WBC Count 9.3      RBC Count 4.49      Hemoglobin 13.6      Hematocrit 41.5      MCV 92      MCH 30.3      MCHC 32.8      RDW 11.7      Platelet Count 263      % Neutrophils 87      % Lymphocytes 10      % Monocytes 3      Mids % (Monos, Eos, Basos)        % Eosinophils 0      % Basophils 0      % Immature Granulocytes 0      NRBCs per 100 WBC 0      Absolute Neutrophils 8.1      Absolute Lymphocytes 1.0      Absolute Monocytes 0.3      Mids Abs (Monos, Eos, Basos)        Absolute Eosinophils 0.0      Absolute Basophils 0.0      Absolute Immature Granulocytes 0.0      Absolute NRBCs 0.0            Emergency Department Course & Assessments:  Interventions:  Medications   melatonin tablet 1 mg (has no administration in time range)   ondansetron (ZOFRAN ODT) ODT tab 4 mg (4 mg Oral $Given 10/22/23 0133)     Or   ondansetron (ZOFRAN) injection 4 mg ( Intravenous See Alternative 10/22/23 0133)   dextrose 5% and 0.9% NaCl infusion ( Intravenous $New Bag 10/22/23 0132)   acetaminophen (TYLENOL) tablet 650 mg (has no administration in time range)     Or   acetaminophen (TYLENOL) Suppository 650 mg (has no administration in time range)   prochlorperazine (COMPAZINE) injection 10 mg (has no administration in time range)     Or   prochlorperazine (COMPAZINE) tablet 10 mg (has no administration in time range)     Or   prochlorperazine (COMPAZINE) suppository 25 mg (has no administration in time range)   ketorolac (TORADOL) injection 30 mg (30 mg Intravenous $Given 10/22/23 0133)   oxyCODONE (ROXICODONE) tablet 5 mg (has no administration in time range)   ondansetron (ZOFRAN) 2 MG/ML injection (4 mg  $Given 10/21/23 1844)   sodium chloride 0.9% BOLUS 1,000 mL (0 mLs Intravenous Stopped 10/21/23 1958)   OLANZapine (zyPREXA) IV injection 2.5 mg (2.5 mg Intravenous $Given 10/21/23 2251)   sodium chloride 0.9% BOLUS 1,000 mL (1,000 mLs Intravenous $New Bag 10/21/23 2117)    iopamidol (ISOVUE-370) solution 72 mL (72 mLs Intravenous $Given 10/21/23 2155)   Saline Flush (61 mLs Intravenous $Given 10/21/23 2155)        Assessments:  2106 I obtained history and examined the patient as noted above.    Independent Interpretation (X-rays, CTs, rhythm strip):  None    Consultations/Discussion of Management or Tests:  ED Course as of 10/22/23 0209   Sat Oct 21, 2023   2315 I rechecked the patient   2323 Spoke with Walt Underwood MD of the hospitalist service who agreed to admission       Social Determinants of Health affecting care:   None    Disposition:  The patient was admitted to the hospital under the care of Dr. Underwood.     Impression & Plan    Medical Decision Making:  Patient is a 63-year-old female with a history of Crohn's disease who presents with abdominal pain, nausea and vomiting.  Laboratory work generally unremarkable with normal creatinine and electrolytes.  Urinalysis shows ketonuria but no sign of infection.  Lipase within normal range lower concern for pancreatitis.  White blood cell count within normal range as well.  CT imaging of the abdomen pelvis obtained given concerns for sinister intra-abdominal abnormalities.  CT imaging revealed diffuse proctocolitis.  Normal appendix and no other sinister intra-abdominal pathologies.  Low suspicion for infectious pathologies that would necessitate antibiotics or surgical consultation at this time.  Patient continues to have ongoing nausea and abdominal pain symptoms.  She continues to appear quite dehydrated.  We will plan for observation admission for continued symptomatic management and fluid resuscitation.  I spoke with the hospitalist service who agreed to observation.    Diagnosis:    ICD-10-CM    1. Proctocolitis  K52.9       2. Nausea and vomiting, unspecified vomiting type  R11.2       3. LLQ abdominal pain  R10.32       4. Hx of Crohn's disease  Z87.19            Discharge Medications:  Current Discharge Medication List          Scribe Disclosure:  I, Ruben Cordova, am serving as a scribe at 10:32 PM on 10/21/2023 to document services personally performed by Mitch Howe MD based on my observations and the provider's statements to me.     10/21/2023   Mitch Howe MD White, Scott, MD  10/22/23 0210

## 2023-10-22 NOTE — PROGRESS NOTES
RECEIVING UNIT ED HANDOFF REVIEW    ED Nurse Handoff Report was reviewed by: Prachi Sevilla RN on October 22, 2023 at 12:54 AM

## 2023-10-22 NOTE — PHARMACY-ADMISSION MEDICATION HISTORY
Pharmacy Intern Admission Medication History    Admission medication history is complete. The information provided in this note is only as accurate as the sources available at the time of the update.    Information Source(s): Patient via in-person    Pertinent Information: Left hydrocortisone, hydroxyzine and bactrim on list due to existing refills, but patient is not currently taking.    Hydrocortisone and hydroxyzine were used for a bee sting that has now healed.    Changes made to PTA medication list:  Added: None  Deleted: None  Changed: None    Allergies reviewed with patient and updates made in EHR: yes    Medication History Completed By: Elmer Etienne 10/22/2023 8:57 AM    No outpatient medications have been marked as taking for the 10/21/23 encounter (Hospital Encounter).

## 2023-10-22 NOTE — PROGRESS NOTES
Shift Summary [id number]    Admitting Diagnosis: Proctocolitis  LLQ abdominal pain   Hx of Crohn's disease   Nausea and vomiting, unspecified vomiting type     Patient A&Ox4. VSS, on RA. CMS intact. Abdominal Pain manage with PRN Toradol, oxycodone. Infusing LR@ 100.  GI and Urology consulted.   Pt on Advance Diet as Tolerated: Clear Liquid Diet. Up indep, was educated to call if need assistance.Will continue to monitor.
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
## 2023-10-22 NOTE — PROGRESS NOTES
RECEIVING UNIT ED HANDOFF REVIEW    ED Nurse Handoff Report was reviewed by: Wesley Pizano, LUIS CARLOS on October 22, 2023 at 12:28 AM

## 2023-10-22 NOTE — PLAN OF CARE
Goal Outcome Evaluation:    Summary: Hx of Crohn's disease vomiting and nausea.   Primary Diagnosis: Hx of Crohn's disease.   Orientation: A&O x4  Aggression Stop Light: Green  Mobility: Independent, no bed alarm  Pain Management: 10/10 abdominal pain when admitted on unit. PRN Toradol IV provided relief. PRN Zofran x1 given for nausea. PRN Oxy x1 given.   Diet: NPO  Bowel/Bladder: Continent of B/B  Abnormal Lab/Assessments: VSS on RA except slightly HTN.   Drain/Device/Wound: L PIV infusing D5W & 0.9NS @ 100mL/hr  Consults: GI  D/C Day/Goals/Place: Pending pt improvement.     Shift Note: Pt was in a lot of pain when brought to unit. IV Toradol helped and she was able to get some rest. Blood cultures pending. CT results are available.
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
## 2023-10-22 NOTE — PROGRESS NOTES
[facility]    Hospitalist Progress Note    Assessment & Plan   Date of Admission:  10/21/2023        Assessment & Plan  Carina Chaney is a 63 year old female admitted on 10/21/2023. She has a hx of Crohn's disease, prior hx of DVT, who presents with N/V. She also has R sided abdominal pain and unable to keep liquids down. Had some loose stools which have resolved.  She describes the abdominal pain as cramping that comes and goes with an intensity of 8 out of 10 when it comes on.  Her emesis is mainly clear.  She did not check her stools to see if there was any blood.  She has had some subjective chills but no documented fever.  She does admit to smoking marijuana.  The last time she saw a gastroenterologist has been a couple of years.  I discussed her care with the ER physician and I am asked to admit her.     Intractable nausea vomiting and abdominal cramping.  CT abd shows possible mild diffuse proctocolitis  -She does have a history of Crohn's which is not optimally medically managed.  However I do not think she has a flareup of her Crohn's.  -Her CT abdomen and pelvis with contrast shows possible mild diffuse proctocolitis however this could also be due to incomplete distention.  -It is possible that she has nausea and vomiting due to cannabinoid use.  -afebrile. Nl vitals  -2L fluid bolus in ED.   -  wbc remains wnl at 9 range  -. Hb 13--> 11 range.   - nl bmp, low nl K.   -has received toradol and oxycodone today  -symptoms resolved.   - does smoke daily marijuana -? Contribrituting     Plan  -Supportive management with IV fluids and antiemetics. LR  -She is unable to see gastroenterology hence will have gastroenterology see her.  -Advance diet as tolerated. Clears tolerated, advance to full liquids for lunch then low fat regular diet  - npo midnight for possible colonoscopy tomorrow if clearly not tolerating diet and increased pain. \  - outpatient [facility] follow up for Crohns, they  will arrange  - [facility] PCP follow up 1- 2 weeks     Microscopic hematuria.  -smokes marijuana daily. No hx gross hematuria. No clear uti sxs. No hx renal stones or renal colic  - needs out-pt follow up with urology, referral placed, will likely need cystoscopy and further CT imaging outpatient with urology  - encouraged smoking cessation  - discussed plan with patient      Daily marijuana use.   - smoking daily. Encouraged cessation.   -pcp follow up        Diet: clear liquid diet for now, then per GI   DVT Prophylaxis: Pneumatic Compression Devices  Chamorro Catheter: Not present  Lines: None     Cardiac Monitoring: None  Code Status:  Full Code.    Dispo: anticipate discharge home with tomorrow if tolerated advancement of diet.   [facility] and PCP follow up         Clinically Significant Risk Factors Present on Admission []Expand by Default           # Hypercalcemia: Highest Ca = 10.2 mg/dL in last 2 days, will monitor as appropriate                 # Asthma: noted on problem list     Discussed care plan with pt, rn, GI    Ismael Morataya MD, MD  Text Page  (7am to 6pm)  Interval History   Low grade fever this am.   Nl hemodynamics  Feeling much better. Tolerated clears  Not much abd pain  No uti sxs. No renal colic  No gross hematuria  No menses    -Data reviewed today: I reviewed all new labs and imaging results over the last 24 hours. I personally reviewed labs and imaging since admission     Physical Exam   Temp: 98.4  F (36.9  C) Temp src: Oral BP: 139/82 Pulse: 62   Resp: 16 SpO2: 99 % O2 Device: None (Room air)    Vitals:    10/21/23 2117   Weight: 65.3 kg (144 lb)     Vital Signs with Ranges  Temp:  [98.4  F (36.9  C)-100.2  F (37.9  C)] 98.4  F (36.9  C)  Pulse:  [62-74] 62  Resp:  [16-18] 16  BP: (139-153)/(77-87) 139/82  SpO2:  [98 %-99 %] 99 %  I/O last 3 completed shifts:  In: 1000 [IV Piggyback:1000]  Out: -     Constitutional: Nad, nontoxic appearing   Respiratory: CTAB  Cardiovascular: RRR no r/g/m  GI:  soft, nt, nd  Skin/Integumen: no rash or edema  Neuro: nl speech and mentation  Psych: nl affect        Medications    lactated ringers 100 mL/hr at 10/22/23 0838         Data   Recent Labs   Lab 10/22/23  0723 10/21/23  1844   WBC 9.3 9.3   HGB 11.4* 13.6   MCV 92 92    263    139   POTASSIUM 3.5 3.4   CHLORIDE 108* 99   CO2 24 23   BUN 8.7 11.9   CR 0.58 0.57   ANIONGAP 10 17*   STACEY 9.1 10.2   * 148*   ALBUMIN  --  4.8   PROTTOTAL  --  7.9   BILITOTAL  --  0.6   ALKPHOS  --  97   ALT  --  19   AST  --  27   LIPASE  --  22       Imaging:   Recent Results (from the past 24 hour(s))   CT Abdomen Pelvis w Contrast    Narrative    EXAM: CT ABDOMEN PELVIS W CONTRAST  LOCATION: [facility]  DATE: 10/21/2023    INDICATION: Right lower quadrant abdominal pain.  COMPARISON: 03/17/2017.  TECHNIQUE: CT scan of the abdomen and pelvis was performed following injection of IV contrast. Multiplanar reformats were obtained. Dose reduction techniques were used.  CONTRAST: 72 mL Isovue-370.    FINDINGS:    LOWER CHEST: No suspicious abnormality.    HEPATOBILIARY: Liver enhances normally and is normal in size.    Gallbladder is normal.    No intrahepatic or intrahepatic biliary ductal dilatation.    PANCREAS: Enhances normally. No peripancreatic inflammatory fat stranding.    SPLEEN: Enhances normally. Normal size.    ADRENAL GLANDS: Normal.    KIDNEYS: Both kidneys enhance symmetrically, without hydronephrosis.    No nephroureterolithiasis.    Urinary bladder is unremarkable.    PELVIC ORGANS: No suspicious abnormality. Multiple uterine fibroids, some of which are partially calcified.    BOWEL: No evidence of acute gastrointestinal inflammation or obstruction. Mildly thick-walled appearance throughout the colon and rectum, likely due to incomplete distention, though a mild diffuse proctocolitis is difficult to exclude. Normal appendix.   Minimal colonic diverticulosis, without evidence of  diverticulitis.    No intraperitoneal free fluid or free air.    LYMPH NODES: No suspicious abdominal or pelvic lymphadenopathy.    VASCULATURE: No abdominal aortic aneurysm. Mild-to-moderate atheromatous disease.    MUSCULOSKELETAL: No suspicious abnormality.    OTHER: No additionally significant abnormalities.      Impression    IMPRESSION:   1.  Possible mild diffuse proctocolitis.   2.  Normal appendix.  3.  Nonacute findings, as detailed above.
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
## 2023-10-23 PROBLEM — R31.29 MICROSCOPIC HEMATURIA: Status: ACTIVE | Noted: 2023-10-23

## 2023-10-23 LAB
ANION GAP SERPL CALCULATED.3IONS-SCNC: 10 MMOL/L (ref 7–15)
BUN SERPL-MCNC: 12.3 MG/DL (ref 8–23)
CALCIUM SERPL-MCNC: 9.1 MG/DL (ref 8.8–10.2)
CHLORIDE SERPL-SCNC: 107 MMOL/L (ref 98–107)
CREAT SERPL-MCNC: 0.65 MG/DL (ref 0.51–0.95)
DEPRECATED HCO3 PLAS-SCNC: 25 MMOL/L (ref 22–29)
EGFRCR SERPLBLD CKD-EPI 2021: >90 ML/MIN/1.73M2
GLUCOSE BLDC GLUCOMTR-MCNC: 91 MG/DL (ref 70–99)
GLUCOSE SERPL-MCNC: 103 MG/DL (ref 70–99)
POTASSIUM SERPL-SCNC: 3.5 MMOL/L (ref 3.4–5.3)
POTASSIUM SERPL-SCNC: 3.5 MMOL/L (ref 3.4–5.3)
SODIUM SERPL-SCNC: 142 MMOL/L (ref 135–145)

## 2023-10-23 PROCEDURE — 250N000011 HC RX IP 250 OP 636: Mod: JZ | Performed by: INTERNAL MEDICINE

## 2023-10-23 PROCEDURE — 82310 ASSAY OF CALCIUM: CPT | Performed by: HOSPITALIST

## 2023-10-23 PROCEDURE — 120N000001 HC R&B MED SURG/OB

## 2023-10-23 PROCEDURE — 96376 TX/PRO/DX INJ SAME DRUG ADON: CPT

## 2023-10-23 PROCEDURE — 250N000013 HC RX MED GY IP 250 OP 250 PS 637: Performed by: INTERNAL MEDICINE

## 2023-10-23 PROCEDURE — G0378 HOSPITAL OBSERVATION PER HR: HCPCS

## 2023-10-23 PROCEDURE — 99232 SBSQ HOSP IP/OBS MODERATE 35: CPT | Performed by: HOSPITALIST

## 2023-10-23 PROCEDURE — 36415 COLL VENOUS BLD VENIPUNCTURE: CPT | Performed by: INTERNAL MEDICINE

## 2023-10-23 PROCEDURE — 258N000003 HC RX IP 258 OP 636: Performed by: INTERNAL MEDICINE

## 2023-10-23 PROCEDURE — 80048 BASIC METABOLIC PNL TOTAL CA: CPT | Performed by: INTERNAL MEDICINE

## 2023-10-23 PROCEDURE — 96361 HYDRATE IV INFUSION ADD-ON: CPT

## 2023-10-23 PROCEDURE — 82962 GLUCOSE BLOOD TEST: CPT

## 2023-10-23 RX ORDER — NALOXONE HYDROCHLORIDE 0.4 MG/ML
0.4 INJECTION, SOLUTION INTRAMUSCULAR; INTRAVENOUS; SUBCUTANEOUS
Status: DISCONTINUED | OUTPATIENT
Start: 2023-10-23 | End: 2023-10-24 | Stop reason: HOSPADM

## 2023-10-23 RX ORDER — NALOXONE HYDROCHLORIDE 0.4 MG/ML
0.2 INJECTION, SOLUTION INTRAMUSCULAR; INTRAVENOUS; SUBCUTANEOUS
Status: DISCONTINUED | OUTPATIENT
Start: 2023-10-23 | End: 2023-10-24 | Stop reason: HOSPADM

## 2023-10-23 RX ORDER — POTASSIUM CHLORIDE 1500 MG/1
20 TABLET, EXTENDED RELEASE ORAL ONCE
Status: COMPLETED | OUTPATIENT
Start: 2023-10-23 | End: 2023-10-23

## 2023-10-23 RX ADMIN — OXYCODONE HYDROCHLORIDE 5 MG: 5 TABLET ORAL at 13:48

## 2023-10-23 RX ADMIN — KETOROLAC TROMETHAMINE 30 MG: 15 INJECTION, SOLUTION INTRAMUSCULAR; INTRAVENOUS at 03:06

## 2023-10-23 RX ADMIN — OXYCODONE HYDROCHLORIDE 5 MG: 5 TABLET ORAL at 01:45

## 2023-10-23 RX ADMIN — OXYCODONE HYDROCHLORIDE 5 MG: 5 TABLET ORAL at 07:47

## 2023-10-23 RX ADMIN — ACETAMINOPHEN 650 MG: 325 TABLET, FILM COATED ORAL at 07:47

## 2023-10-23 RX ADMIN — SODIUM CHLORIDE, POTASSIUM CHLORIDE, SODIUM LACTATE AND CALCIUM CHLORIDE: 600; 310; 30; 20 INJECTION, SOLUTION INTRAVENOUS at 03:17

## 2023-10-23 RX ADMIN — SODIUM CHLORIDE, POTASSIUM CHLORIDE, SODIUM LACTATE AND CALCIUM CHLORIDE: 600; 310; 30; 20 INJECTION, SOLUTION INTRAVENOUS at 23:52

## 2023-10-23 RX ADMIN — KETOROLAC TROMETHAMINE 30 MG: 15 INJECTION, SOLUTION INTRAMUSCULAR; INTRAVENOUS at 10:14

## 2023-10-23 RX ADMIN — KETOROLAC TROMETHAMINE 30 MG: 15 INJECTION, SOLUTION INTRAMUSCULAR; INTRAVENOUS at 16:28

## 2023-10-23 RX ADMIN — POTASSIUM CHLORIDE 20 MEQ: 1500 TABLET, EXTENDED RELEASE ORAL at 15:25

## 2023-10-23 RX ADMIN — KETOROLAC TROMETHAMINE 30 MG: 15 INJECTION, SOLUTION INTRAMUSCULAR; INTRAVENOUS at 22:39

## 2023-10-23 RX ADMIN — OXYCODONE HYDROCHLORIDE 5 MG: 5 TABLET ORAL at 20:01

## 2023-10-23 ASSESSMENT — ACTIVITIES OF DAILY LIVING (ADL)
ADLS_ACUITY_SCORE: 22
ADLS_ACUITY_SCORE: 21
ADLS_ACUITY_SCORE: 22
ADLS_ACUITY_SCORE: 21
ADLS_ACUITY_SCORE: 22

## 2023-10-23 NOTE — UTILIZATION REVIEW
[facility] Utilization Review  Admission Status; Secondary Review Determination     Admission Date: 10/21/2023  8:59 PM      Under the authority of the Utilization Management Committee, the utilization review process indicated a secondary review on the above patient.  The review outcome is based on review of the medical records, discussions with staff, and applying clinical experience noted on the date of the review.        (X)      Inpatient Status Appropriate - This patient's medical care is consistent with medical management for inpatient care and reasonable inpatient medical practice.          RATIONALE FOR DETERMINATION   63-year-old female with history of Crohn's disease, prior DVT, admitted with nausea vomiting and right-sided abdominal pain with loose stools which have resolved.  No blood in the stool, subjective chills but no fevers.  CT abdomen shows mild diffuse proctocolitis.  Patient received IV fluid bolus in the ED, no leukocytosis, requiring Toradol and oxycodone for pain control.  GI team recommend colonoscopy tomorrow.  Patient has symptomatic hypotension, continued on IV fluid hydration, needs further work-up for dizziness, ongoing pain medications and symptom control with plans for colonoscopy tomorrow, with anticipated length of stay more than 2 midnight, and ongoing interventions and further work-up needed, recommend change to inpatient status, communicated to Dr. Panda      The severity of illness, intensity of service provided, expected LOS and risk for adverse outcome make the care complex, high risk and appropriate for hospital admission.The patient requires hospital based medical care which is anticipated to require a stay of 2 or more midnights; according to CMS guidelines the patient should be admitted as inpatient        The information on this document is developed by the utilization review team in order for the business office to ensure compliance.  This only denotes the  appropriateness of proper admission status and does not reflect the quality of care rendered.         The definitions of Inpatient Status and Observation Status used in making the determination above are those provided in the CMS Coverage Manual, Chapter 1 and Chapter 6, section 70.4.      Sincerely,       Priti Byers MD  Physician Advisor  Utilization Review-[place]    Phone: [phone]

## 2023-10-23 NOTE — PROGRESS NOTES
[facility] note    I saw this patient this afternoon.  She is having increased abdominal pain.  Her exam is benign.  We will pursue CT enterography to evaluate for active Crohn's in the small bowel or any complications from her Crohn's disease.  Then, we will plan for full colonoscopy with MAC sedation on Wednesday with the prep tomorrow after the CT scan.    Mitch Bell MD   [facility]  [phone]

## 2023-10-23 NOTE — PROGRESS NOTES
[facility]    Hospitalist Progress Note    Date of Admission:  10/21/2023    Assessment & Plan     Carina Chaney is a 63 year old female admitted on 10/21/2023. She has a hx of Crohn's disease, prior hx of DVT, who presents with N/V and right-sided abdominal pain.  Had some loose stools which have resolved.  She describes the abdominal pain as cramping that comes and goes with an intensity of 8 out of 10 when it comes on.  Her emesis is mainly clear.  No blood in stools.  She has had some subjective chills but no documented fever.  She does admit to smoking marijuana.  The last time she saw a gastroenterologist has been a couple of years.     Intractable nausea vomiting and abdominal cramping.  CT abd shows possible mild diffuse proctocolitis  -She does have a history of Crohn's disease dating back to 1983.  She notes that she typically takes medications only when she has flares which have been very rare, goes several years without any issues.  Otherwise she notes that she manages on her own, natural treatments without medications. Last colonoscopy 2 years ago by Dr. Odell at [facility] showed Crohn's affecting the terminal ileum. She did complete a round of Entocort 2 years ago.   -Her CT abdomen and pelvis with contrast shows possible mild diffuse proctocolitis.  -It is possible that she has nausea and vomiting due to cannabinoid use.  -afebrile. Nl vitals  -2L fluid bolus in ED.   -  wbc remains wnl at 9 range  -. Hb 13--> 11 range.   - nl bmp, low nl K.   -As needed Toradol, oxycodone for pain  - does smoke daily marijuana -? Contribrituting      Plan  -Supportive management with IV fluids and antiemetics.   -[facility] has been consulted.  Plan for colonoscopy tomorrow.  -Patient had some dizziness while getting up this morning, could be orthostasis versus related to narcotics.  Continue IVF.     Microscopic hematuria.  -smokes marijuana daily. No hx gross hematuria. No clear uti sxs. No hx renal  stones or renal colic  - needs out-pt follow up with urology, referral placed, will likely need cystoscopy and further CT imaging outpatient with urology  - encouraged smoking cessation  - discussed plan with patient        Daily marijuana use.   - smoking daily. Encouraged cessation.   -pcp follow up        Diet: N.p.o. at midnight  DVT Prophylaxis: Pneumatic Compression Devices  Chamorro Catheter: Not present  Lines: None     Cardiac Monitoring: None  Code Status:  Full Code.     Dispo: Pending GI eval      Medical Decision Making       Please see A&P for additional details of medical decision making.        Clinically Significant Risk Factors Present on Admission           # Hypercalcemia: Highest Ca = 10.2 mg/dL in last 2 days, will monitor as appropriate                 # Asthma: noted on problem list          Dede Panda MD  Text Page (7am - 6pm, M-F)  [facility]  Securely message with the Voc[employer] Web Console (learn more here)  Text page via [employer] Paging/Directory      Interval History   This morning had some dizziness when she stood up but thinks that this happened due to her taking pain meds just shortly before [oxycodone].  Notes that the dizziness was very short-lived.  No longer dizzy.  Is having ongoing abdominal cramps.  She wants to stay for colonoscopy tomorrow.  Is having some chills but notes that it is because of the vent blowing cool air on her from the ceiling.  No bloody stools here.  No nausea or vomiting today.  Appetite is low today.  Notes that this is unusual for her.    -Data reviewed today: I reviewed all new labs and imaging results over the last 24 hours. I personally reviewed CT scan with result as noted above    Physical Exam   Temp: 98.1  F (36.7  C) Temp src: Axillary BP: (!) 143/79 Pulse: 54   Resp: 18 SpO2: 100 % O2 Device: None (Room air)    Vitals:    10/21/23 2117   Weight: 65.3 kg (144 lb)     Vital Signs with Ranges  Temp:  [97.8  F (36.6  C)-98.1  F  (36.7  C)] 98.1  F (36.7  C)  Pulse:  [54-62] 54  Resp:  [16-18] 18  BP: (130-143)/(76-88) 143/79  SpO2:  [98 %-100 %] 100 %  I/O last 3 completed shifts:  In: 800 [I.V.:800]  Out: -     Constitutional: Alert, appears comfortable, in no acute distress  Respiratory: Non labored breathing, clear to auscultation bilaterally, no crackles or wheezes  Cardiovascular: Heart sounds regular rate and rhythm, no murmurs, no leg edema  GI: Abdomen is soft, non distended, non tender. Normal BS  Skin/Integumen: no rashes, no pressure sores  Neuro: alert, converses appropriately, moving all extremities, fluent speech, no facial asymmetry  Psych: mood and affect appropriate      Medications    lactated ringers 100 mL/hr at 10/23/23 0317         Data   Recent Labs   Lab 10/23/23  1107 10/23/23  1003 10/22/23  0723 10/21/23  1844   WBC  --   --  9.3 9.3   HGB  --   --  11.4* 13.6   MCV  --   --  92 92   PLT  --   --  219 263     --  142 139   POTASSIUM 3.5  3.5  --  3.5 3.4   CHLORIDE 107  --  108* 99   CO2 25  --  24 23   BUN 12.3  --  8.7 11.9   CR 0.65  --  0.58 0.57   ANIONGAP 10  --  10 17*   STACEY 9.1  --  9.1 10.2   * 91 136* 148*   ALBUMIN  --   --   --  4.8   PROTTOTAL  --   --   --  7.9   BILITOTAL  --   --   --  0.6   ALKPHOS  --   --   --  97   ALT  --   --   --  19   AST  --   --   --  27   LIPASE  --   --   --  22       Imaging  No results found for this or any previous visit (from the past 24 hour(s)).

## 2023-10-23 NOTE — PLAN OF CARE
10/23/23 0608-1055    Summary: Hx of Crohn's disease vomiting and nausea.   Primary Diagnosis: Hx of Crohn's disease.   Orientation: A&O x4  Aggression Stop Light: Green  Mobility: Independent, no bed alarm  Pain Management: 10/10 abdominal pain when admitted on unit. PRN Toradol IV provided relief. PRN Zofran x1 given for nausea. PRN Oxy x1 given.   Diet: NPO  Bowel/Bladder: Continent of B/B  Abnormal Lab/Assessments: VSS on RA except slightly HTN.   Drain/Device/Wound: L PIV infusing LR 100mL/hr  Consults: GI  D/C Day/Goals/Place: Pending pt improvement.     Shift Note: Pt NPO at ,midnight for colonoscopy 10/24. Continue plan of care

## 2023-10-23 NOTE — PLAN OF CARE
Goal Outcome Evaluation:       Summary: abdominal pain, nausea/vomiting and diarrhea  Primary Diagnosis: History of Crohn's disease       ONLY POST BELOW FOR END OF SHIFT NOTE     Orientation: alert and oriented  Activity: independent   Diet/BS Checks: Regular  Tele:  none  IV Access/Drains: left Antecubital PIV  Pain Management: 5 mg of oxycodone every six hours and 30 mg of IV Toradol  Abnormal VS/Results: vitals are within normal limits.  Bowel/Bladder: continent  Skin/Wounds: None  Consults: GI, advanced diet to regular tolerating well. Outpatient follow up with gastroenterology.   D/C Disposition: likely to discharge today.   Other Info: patient reports her abdominal pain between 2 and 4. Oxycodone and Toradol given once. She was noted tearing due to her pain. But declined to escalating pain medicine adjustment.

## 2023-10-23 NOTE — PLAN OF CARE
Orientation: A&Ox4    Activity: Independent     Diet/BS Checks: Regular     Tele: None, VSS    IV Access/Drains: LPIV infusing LR at 100 mL/hr    Pain Management: Prn IV toradol q6hr and PO oxy q6hr. Pt would like to be woken up when next doses are available     Abnormal VS/Results: Blood cultures pending     Bowel/Bladder: Continent of both     Skin/Wounds: Intact     Consults: GI    D/C Disposition: Pending improvement, possibly tomorrow     Other Info: Hx Crohn's Disease

## 2023-10-23 NOTE — PROGRESS NOTES
"[facility]  [facility]  Gastroenterology Progress note    Interval History:      Pt reports pain at 1/10 currently after pain medication.  No nausea or vomiting.  Felt she was going to move bowels and collapsed back on bed and said she feels dizzy.  Nursing alerted.  Reports no appetite.        Vital Signs:      BP (!) 143/79 (BP Location: Right arm)   Pulse 54   Temp 98.1  F (36.7  C) (Axillary)   Resp 18   Ht 1.676 m (5' 6\")   Wt 65.3 kg (144 lb)   LMP  (LMP Unknown)   SpO2 100%   BMI 23.24 kg/m    Temp (24hrs), Av  F (36.7  C), Min:97.8  F (36.6  C), Max:98.1  F (36.7  C)    Patient Vitals for the past 72 hrs:   Weight   10/21/23 2117 65.3 kg (144 lb)       Intake/Output Summary (Last 24 hours) at 10/23/2023 0840  Last data filed at 10/23/2023 0659  Gross per 24 hour   Intake 800 ml   Output --   Net 800 ml         Constitutional: NAD, comfortable  Cardiovascular: RRR, normal S1, S2   Respiratory: CTAB  Abdomen: soft, non-tender, nondistended    Additional Comments:  ROS, FH, SH: See initial GI consult for details.    Laboratory Data:  Recent Labs   Lab Test 10/22/23  0723 10/21/23  1844 09/24/22  0319 17  0605 17  0600   WBC 9.3 9.3 10.6   < > 7.0   HGB 11.4* 13.6 12.8   < > 11.1*   MCV 92 92 96   < > 94    263 303   < > 211   INR  --   --   --   --  1.11    < > = values in this interval not displayed.     Recent Labs   Lab Test 10/22/23  0723 10/21/23  1844 11/18/22  0754    139 142   POTASSIUM 3.5 3.4 4.0   CHLORIDE 108* 99 104   CO2 24 23 26   BUN 8.7 11.9 11.3   CR 0.58 0.57 0.64   ANIONGAP 10 17* 12   STACEY 9.1 10.2 9.6     Recent Labs   Lab Test 10/21/23  1846 10/21/23  1844 03/22/23  1030 22  0754 22  0319 21  0839 17  0830 17  2331   ALBUMIN  --  4.8  --  4.3  --  3.8   < > 4.4   BILITOTAL  --  0.6  --  0.4  --  0.4   < > 0.5   DBIL  --  <0.20  --   --   --   --   --   --    ALT  --  19  --  9*  --  19   < > " 22   AST  --  27  --  25  --  19   < > 28   ALKPHOS  --  97  --  80  --  83   < > 104   PROTEIN 30*  --  Negative  --  20*  --    < >  --    LIPASE  --  22  --   --   --   --   --  375    < > = values in this interval not displayed.         Assessment:  64 yo female with lower abdominal pain.  This began 2 days ago, improved yesterday.  No nausea, vomiting, diarrhea, hematochezia, melena.  Denies fever or chills.    Last colonoscopy 2 years ago by Dr. Odell at [facility] showed Crohn's affecting the terminal ileum.  No prior surgeries.  Patient is not on treatment.  She reports this has been offered in the past but she has been managing with natural treatment without medication.  She did complete a round of Entocort 2 years ago.  Hemoglobin 11.4.  CT A/P 10/21 with possible mild diffuse proctocolitis.  Patient tolerating regular diet.  Continued pain at 2-4/10.  Controlled with oxycodone and Toradol.  Reports dizziness this am after getting up to have a BM.  She desires to go home but will consider staying for further workup given dizziness, ongoing use of pain medication, lack of appetite.  Plan:  -  Pain control per primary team.  -  Let us know if patient is agreeable to staying for further evaluation.  Would plan on colonoscopy tomorrow.  -  Otherwise, outpatient follow up in [facility] Crohn's clinic.  Our office will call to arrange.    Total Time Spent:  20 minutes    EMMANUELLE Vargas  [facility]  Office:  [phone] call if needed after 11:30AM  Cell:  [phone], not available after 11:30AM at this number

## 2023-10-24 ENCOUNTER — APPOINTMENT (OUTPATIENT)
Dept: CT IMAGING | Facility: CLINIC | Age: 63
DRG: 392 | End: 2023-10-24
Attending: INTERNAL MEDICINE
Payer: COMMERCIAL

## 2023-10-24 VITALS
WEIGHT: 144 LBS | RESPIRATION RATE: 17 BRPM | BODY MASS INDEX: 23.14 KG/M2 | DIASTOLIC BLOOD PRESSURE: 84 MMHG | OXYGEN SATURATION: 98 % | TEMPERATURE: 98.6 F | SYSTOLIC BLOOD PRESSURE: 149 MMHG | HEART RATE: 58 BPM | HEIGHT: 66 IN

## 2023-10-24 LAB
ANION GAP SERPL CALCULATED.3IONS-SCNC: 10 MMOL/L (ref 7–15)
BUN SERPL-MCNC: 10.2 MG/DL (ref 8–23)
CALCIUM SERPL-MCNC: 9.2 MG/DL (ref 8.8–10.2)
CHLORIDE SERPL-SCNC: 103 MMOL/L (ref 98–107)
CREAT SERPL-MCNC: 0.61 MG/DL (ref 0.51–0.95)
DEPRECATED HCO3 PLAS-SCNC: 26 MMOL/L (ref 22–29)
EGFRCR SERPLBLD CKD-EPI 2021: >90 ML/MIN/1.73M2
ERYTHROCYTE [DISTWIDTH] IN BLOOD BY AUTOMATED COUNT: 11.9 % (ref 10–15)
GLUCOSE SERPL-MCNC: 113 MG/DL (ref 70–99)
HCT VFR BLD AUTO: 36.2 % (ref 35–47)
HGB BLD-MCNC: 11.8 G/DL (ref 11.7–15.7)
MCH RBC QN AUTO: 30.4 PG (ref 26.5–33)
MCHC RBC AUTO-ENTMCNC: 32.6 G/DL (ref 31.5–36.5)
MCV RBC AUTO: 93 FL (ref 78–100)
PLATELET # BLD AUTO: 201 10E3/UL (ref 150–450)
POTASSIUM SERPL-SCNC: 3.7 MMOL/L (ref 3.4–5.3)
RBC # BLD AUTO: 3.88 10E6/UL (ref 3.8–5.2)
SODIUM SERPL-SCNC: 139 MMOL/L (ref 135–145)
WBC # BLD AUTO: 6.8 10E3/UL (ref 4–11)

## 2023-10-24 PROCEDURE — 250N000013 HC RX MED GY IP 250 OP 250 PS 637: Performed by: INTERNAL MEDICINE

## 2023-10-24 PROCEDURE — 258N000003 HC RX IP 258 OP 636: Performed by: INTERNAL MEDICINE

## 2023-10-24 PROCEDURE — 36415 COLL VENOUS BLD VENIPUNCTURE: CPT | Performed by: INTERNAL MEDICINE

## 2023-10-24 PROCEDURE — 82310 ASSAY OF CALCIUM: CPT | Performed by: INTERNAL MEDICINE

## 2023-10-24 PROCEDURE — 99239 HOSP IP/OBS DSCHRG MGMT >30: CPT | Performed by: HOSPITALIST

## 2023-10-24 PROCEDURE — 250N000013 HC RX MED GY IP 250 OP 250 PS 637: Performed by: HOSPITALIST

## 2023-10-24 PROCEDURE — 250N000011 HC RX IP 250 OP 636: Performed by: INTERNAL MEDICINE

## 2023-10-24 PROCEDURE — 85027 COMPLETE CBC AUTOMATED: CPT | Performed by: INTERNAL MEDICINE

## 2023-10-24 PROCEDURE — 250N000009 HC RX 250: Performed by: INTERNAL MEDICINE

## 2023-10-24 PROCEDURE — 74177 CT ABD & PELVIS W/CONTRAST: CPT

## 2023-10-24 RX ORDER — IOPAMIDOL 755 MG/ML
70 INJECTION, SOLUTION INTRAVASCULAR ONCE
Status: COMPLETED | OUTPATIENT
Start: 2023-10-24 | End: 2023-10-24

## 2023-10-24 RX ORDER — OXYCODONE HYDROCHLORIDE 5 MG/1
5 TABLET ORAL EVERY 6 HOURS PRN
Qty: 10 TABLET | Refills: 0 | Status: SHIPPED | OUTPATIENT
Start: 2023-10-24 | End: 2023-11-03

## 2023-10-24 RX ORDER — POTASSIUM CHLORIDE 1500 MG/1
20 TABLET, EXTENDED RELEASE ORAL ONCE
Status: COMPLETED | OUTPATIENT
Start: 2023-10-24 | End: 2023-10-24

## 2023-10-24 RX ADMIN — IOPAMIDOL 70 ML: 755 INJECTION, SOLUTION INTRAVENOUS at 09:31

## 2023-10-24 RX ADMIN — POTASSIUM CHLORIDE 20 MEQ: 1500 TABLET, EXTENDED RELEASE ORAL at 10:40

## 2023-10-24 RX ADMIN — SODIUM CHLORIDE, POTASSIUM CHLORIDE, SODIUM LACTATE AND CALCIUM CHLORIDE: 600; 310; 30; 20 INJECTION, SOLUTION INTRAVENOUS at 10:39

## 2023-10-24 RX ADMIN — ACETAMINOPHEN 650 MG: 325 TABLET, FILM COATED ORAL at 06:50

## 2023-10-24 RX ADMIN — SODIUM CHLORIDE 63 ML: 9 INJECTION, SOLUTION INTRAVENOUS at 09:29

## 2023-10-24 ASSESSMENT — ACTIVITIES OF DAILY LIVING (ADL)
ADLS_ACUITY_SCORE: 21
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
## 2023-10-24 NOTE — PLAN OF CARE
Goal Outcome Evaluation:      2300-700     Orientation: A&O x4  Activity: independent   Diet/BS Checks: Clear liquids overnight. NPO in morning for CT today.   Tele:  N/A  IV Access/Drains: L PIV infusing LR at 100 mL/hr  Pain Management: patient presents with abdominal pain, declined interventions. Tylenol given x1 for headache  Abnormal VS/Results: VSS on RA except HTN. On K+ protocol- AM recheck.   Bowel/Bladder: continent   Skin/Wounds: bruise on left wrist   Consults: GI  D/C Disposition: pending   Other Info: Colonoscopy scheduled for 10/25.

## 2023-10-24 NOTE — PLAN OF CARE
Orientation: A&O x4; pleasant  Activity: Independent   Diet/BS Checks: Clear liquids; starting prep evening shift; Colonoscopy scheduled for 10/25.   Tele:  N/A  IV Access/Drains: L PIV infusing LR at 100 mL/hr  Pain Management: Denied  Abnormal VS/Results: VSS on RA; K+ 3.5- on high K+ protocol; replaced and recheck in AM  Bowel/Bladder: Continent; had 4-6 small BMs  Skin/Wounds: Bruise on left wrist; pale  Consults: GI-   D/C Disposition: Possible home tomorrow after colonoscopy. CT was negative.          Goal Outcome Evaluation:

## 2023-10-24 NOTE — PLAN OF CARE
10/23/-4389    Summary:  admitted on 10/18 with nausea, vomiting, abdominal pain and chills    Primary Diagnosis: Intractable nausea vomiting and abdominal cramping.  mild diffuse proctocolitis    Hx:Chron's disease, DVT    Orientation: A&O x4    Aggression Stop Light: Green    Mobility: Independent, no bed alarm    Pain Management: PRN Toradol IV, PRN Oxy    Diet: Regular low fat, clear liquids from [facility]     Bowel/Bladder: Continent of B/B    Abnormal Lab/Assessments: VSS on RA , Hgb-11.4    Drain/Device/Wound: L PIV infusing LR 100mL/hr    Consults: GI    D/C Day/Goals/Place: Plan for CT scan tomorrow and colonoscopy on Wednesday.     Shift Note: VSS on RA. On K protocol-AM draw. RUQ, RLQ abdominal pain managed with PRN meds.

## 2023-10-24 NOTE — PROGRESS NOTES
[facility] note    I spoke to patient on the phone, she feels better and with negative CTE, she wants to go home. She has financial concerns about her stay. I recommended staying for colonoscopy, but she wants to go home and symptoms resolved, thus ok to go home and discuss timing of a colonoscopy with her IBD provider in our group.    Mitch Bell MD   [facility]  [phone]

## 2023-10-24 NOTE — PROVIDER NOTIFICATION
MD Notification    Notified Person: MD    Notified Person Name:Farzad Aguayo    Notification Date/Time:10/23@1557    Notification Interaction:Vocnohemi    Purpose of Notification:You just ordered clear liquids from [facility], so we don't need to keep her NPO for colonoscopy tomorrrow     Orders Received:GI I ordered the clear liquid diet. I did not order it. Please clarify with them , Looks like they are planning colonoscopy on Wednesday ,   So no need for NPO after midnight for today    Comments:

## 2023-10-24 NOTE — PLAN OF CARE
Patient discharged at 6:32 PM to Discharged to home  IV was discontinued. Pain at time of discharge was 0/10. Belongings returned to patient.  Discharge instructions and medications reviewed with patient.  Patient verbalized understanding and all questions were answered.  Prescriptions given to patient.  At time of discharge, patient condition was stable and left the unit 1833 escorted by friend..

## 2023-10-24 NOTE — PROGRESS NOTES
"  GASTROENTEROLOGY PROGRESS NOTE     IMPRESSION:  1.  History of Crohn's, lower abdominal pain-initial CT showed mild diffuse proctocolitis, CT enterography from today is negative for any small bowel inflammation and there is no abnormality in the colon.  Previous Crohn's disease was in the terminal ileum no prior surgeries and she is not on treatment.  She has managed with natural treatment and just intermittent budesonide, last was 2 years ago.  She is followed by Dr. Odell in our office.  - Given the abnormal CT initially and her ongoing lower abdominal pain which had increased yesterday, we will pursue colonoscopy tomorrow with MAC sedation.  The CT enterography is encouraging that there is not a significant complication from Crohn's or significant active disease at this time.    RECOMMENDATIONS:  1.  Clear liquid diet today  2.  N.p.o. at midnight  3.  GoLytely prep today  4.  Colonoscopy tomorrow    Mitch Bell MD  [facility]  [phone]      ________________________________________________________________________      SUBJECTIVE: Patient was on her way to her CT scan.  She reported that she is feeling much better.  Her lower abdominal pain is improved.       OBJECTIVE:  BP (!) 143/78 (BP Location: Right arm)   Pulse 62   Temp 98.4  F (36.9  C) (Oral)   Resp 16   Ht 1.676 m (5' 6\")   Wt 65.3 kg (144 lb)   LMP  (LMP Unknown)   SpO2 99%   BMI 23.24 kg/m    Temp (24hrs), Av.3  F (36.8  C), Min:97.9  F (36.6  C), Max:98.6  F (37  C)    Patient Vitals for the past 72 hrs:   Weight   10/21/23 2117 65.3 kg (144 lb)        PHYSICAL EXAM  GEN: Alert, NAD.    Remainder of exam was deferred.    Additional Data:  I have reviewed the patient's new clinical lab results:     Recent Labs   Lab Test 10/24/23  0815 10/22/23  0723 10/21/23  1844 17  0605 17  0600   WBC 6.8 9.3 9.3   < > 7.0   HGB 11.8 11.4* 13.6   < > 11.1*   MCV 93 92 92   < > 94    219 263   < > 211   INR  --   -- "   --   --  1.11    < > = values in this interval not displayed.     Recent Labs   Lab Test 10/24/23  0815 10/23/23  1107 10/23/23  1003 10/22/23  0723    142  --  142   POTASSIUM 3.7 3.5  3.5  --  3.5   CHLORIDE 103 107  --  108*   CO2 26 25  --  24   BUN 10.2 12.3  --  8.7   CR 0.61 0.65  --  0.58   ANIONGAP 10 10  --  10   STACEY 9.2 9.1  --  9.1   * 103* 91 136*     Recent Labs   Lab Test 10/21/23  1846 10/21/23  1844 03/22/23  1030 11/18/22  0754 09/24/22  0319 08/27/21  0839 03/17/17  0830 03/16/17  2331   ALBUMIN  --  4.8  --  4.3  --  3.8   < > 4.4   BILITOTAL  --  0.6  --  0.4  --  0.4   < > 0.5   ALT  --  19  --  9*  --  19   < > 22   AST  --  27  --  25  --  19   < > 28   PROTEIN 30*  --  Negative  --  20*  --    < >  --    LIPASE  --  22  --   --   --   --   --  375    < > = values in this interval not displayed.     CT ABDOMEN AND PELVIS WITH CONTRAST 10/24/2023 9:41 AM      HISTORY: Increased abdominal pain, evaluate for active Crohn's.     COMPARISON: October 21, 2023     TECHNIQUE: Volumetric helical acquisition of CT images from the lung  bases through the symphysis pubis after the administration of 70mL  Isovue-370, 1200mL Breeza drank intravenous and negative oral  contrast. Radiation dose for this scan was reduced using automated  exposure control, adjustment of the mA and/or kV according to patient  size, or iterative reconstruction technique.     FINDINGS: There are no dilated loops of small bowel or colon. No bowel  wall thickening. No inflammation.  No mucosal hyperemia. Visualized  fold pattern unremarkable. Geographic area of enhancement in the  posterior right lobe of the liver centered on image 24 series 6,  likely a transient enhancement phenomenon. The bilateral kidneys and  adrenal glands, pancreas, and spleen demonstrate no worrisome focal  lesion. Fibroid uterus. There is no free fluid in the abdomen or  pelvis. No free air in the abdomen. Bone windows reveal no  suspicious  lesions. There are no abdominal or pelvic lymph nodes that are  abnormal by size criteria. The visualized lung bases are unremarkable.                                                                         IMPRESSION: No acute process demonstrated within the abdomen and  pelvis.

## 2023-10-25 ENCOUNTER — TELEPHONE (OUTPATIENT)
Dept: FAMILY MEDICINE | Facility: CLINIC | Age: 63
End: 2023-10-25
Payer: COMMERCIAL

## 2023-10-25 NOTE — TELEPHONE ENCOUNTER
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  -cuffed deflated since off vent.    -doing well on trache collar   Please call the patient and schedule Hospital follow up with me, which patient is due at this time, to further take care of the medical conditions and medications.OK to use same day slot / double book near another virtual slot in 1 weeks.     Thank you,  Suzie Hernandez MD on 10/25/2023

## 2023-10-26 ENCOUNTER — PATIENT OUTREACH (OUTPATIENT)
Dept: CARE COORDINATION | Facility: CLINIC | Age: 63
End: 2023-10-26
Payer: COMMERCIAL

## 2023-10-26 NOTE — PROGRESS NOTES
[facility] Care Resource Center Contact  [facility]/Voicemail     Clinical Data: Post-Discharge Outreach     Outreach attempted x 2.  Left message on patient's voicemail, providing [facility]'s central phone number of [phone] ([phone]) for questions/concerns and/or to schedule an appt with an [facility] provider, if they do not have a PCP.      Plan:  [facility] will do no further outreaches at this time.       Julia García  Community Health Worker  [facility], [facility]  [phone]      *Connected Care Resource Team does NOT follow patient ongoing. Referrals are identified based on internal discharge reports and the outreach is to ensure patient has an understanding of their discharge instructions.

## 2023-10-27 LAB
BACTERIA BLD CULT: NO GROWTH
BACTERIA BLD CULT: NO GROWTH

## 2023-10-30 ENCOUNTER — PATIENT OUTREACH (OUTPATIENT)
Dept: FAMILY MEDICINE | Facility: CLINIC | Age: 63
End: 2023-10-30
Payer: COMMERCIAL

## 2023-10-30 DIAGNOSIS — R87.810 CERVICAL HIGH RISK HPV (HUMAN PAPILLOMAVIRUS) TEST POSITIVE: Primary | ICD-10-CM

## 2023-11-02 ENCOUNTER — PATIENT OUTREACH (OUTPATIENT)
Dept: CARE COORDINATION | Facility: CLINIC | Age: 63
End: 2023-11-02

## 2023-11-02 ENCOUNTER — OFFICE VISIT (OUTPATIENT)
Dept: FAMILY MEDICINE | Facility: CLINIC | Age: 63
End: 2023-11-02
Payer: COMMERCIAL

## 2023-11-02 VITALS — HEART RATE: 64 BPM | OXYGEN SATURATION: 100 % | SYSTOLIC BLOOD PRESSURE: 124 MMHG | DIASTOLIC BLOOD PRESSURE: 75 MMHG

## 2023-11-02 DIAGNOSIS — K50.819 CROHN'S DISEASE OF SMALL AND LARGE INTESTINES WITH COMPLICATION (H): ICD-10-CM

## 2023-11-02 DIAGNOSIS — K52.9 PROCTOCOLITIS: Primary | ICD-10-CM

## 2023-11-02 PROBLEM — J45.909 REACTIVE AIRWAY DISEASE WITHOUT COMPLICATION: Status: RESOLVED | Noted: 2018-10-01 | Resolved: 2023-11-02

## 2023-11-02 PROCEDURE — 99496 TRANSJ CARE MGMT HIGH F2F 7D: CPT | Performed by: INTERNAL MEDICINE

## 2023-11-02 ASSESSMENT — PAIN SCALES - GENERAL: PAINLEVEL: NO PAIN (0)

## 2023-11-02 NOTE — PROGRESS NOTES
Assessment and Plan  1. Proctocolitis  2. Crohn's disease of small and large intestines with complication (H)  Recent hospitalization and discharge from 10/21/23 - 10/24/23 for N, V, Abdominal pain for which CT abdomen showing positive for mild diffuse proctocolitis.   - Last colonoscopy 2 years ago by Dr. Odell at [facility] showed Crohn's affecting the terminal ileum. Pt does have Cannabinoid use. She had supportive treatment and -[facility] has been consulted.  Patient underwent CT enterography that was normal.  Colonoscopy was offered but patient declined as she clinically improved.  - Pt will need follow up with [facility] as outpatient  , referral sent to [facility] for evaluation of microscopic hematuria.            Please note that this note consists of symbols derived from keyboarding, dictation and/or voice recognition software. As a result, there may be errors in the script that have gone undetected. Please consider this when interpreting information found in this chart.    Patient Instructions   As discussed , please follow up with GI as outpatient in 2 weeks and also Urology as scheduled for your hematuria.     ===============================================        Return in about 6 weeks (around 12/14/2023), or if symptoms worsen or fail to improve, for Preventative Visit.    Suzie Hernandez MD  [facility] CARLENE Lim is a 63 year old, presenting for the following health issues:  Hospital F/U        11/2/2023    10:22 AM   Additional Questions   Roomed by ross       [facility] Follow-up Visit:    Hospital/Nursing Home/ Rehab Facility: [facility]  Date of Admission: 10/21/2023  Date of Discharge: 10/24/2023  Reason(s) for Admission: Hx of Crohn's disease     Was your hospitalization related to COVID-19? No   Problems taking medications regularly:  None  Medication changes since discharge: None  Problems adhering to non-medication  "therapy:  None    Summary of hospitalization:  [facility] discharge summary reviewed  Diagnostic Tests/Treatments reviewed.  Follow up needed: PCP, GI , Urology  Other Healthcare Providers Involved in Patient s Care:         Homecare  Update since discharge: improved.         Plan of care communicated with patient           Last seen pt on 11/2022 for Annual physical and she is here for hospital follow up today       Allergies   Allergen Reactions    Hydromorphone Nausea and Vomiting    Morphine Nausea and Vomiting and Other (See Comments)     Tolerates oxycodone 5/2014  Severe nausea and vomiting  \"violent vomiting.\"      Ciprofloxacin Nausea and Vomiting and Other (See Comments)     \"cramps\"  Cramps, nausea, vomiting.  Can take with caution if really necessary.      Codeine     Prednisone Unknown    Sodium Phosphate Nausea and Vomiting        Past Medical History:   Diagnosis Date    Colitis     Crohn disease (H)        Past Surgical History:   Procedure Laterality Date    ENT SURGERY      GYN SURGERY         Family History   Problem Relation Age of Onset    Hypertension Father        Social History     Tobacco Use    Smoking status: Some Days     Types: Cigarettes, Other     Passive exposure: Never    Smokeless tobacco: Never   Substance Use Topics    Alcohol use: Yes     Comment: Very little, less than a glass of wine per month        Current Outpatient Medications   Medication    oxyCODONE (ROXICODONE) 5 MG tablet     No current facility-administered medications for this visit.        Review of Systems   Constitutional, HEENT, cardiovascular, pulmonary, GI, , musculoskeletal, neuro, skin, endocrine and psych systems are negative, except as otherwise noted.      Objective    /75   Pulse 64   LMP  (LMP Unknown)   SpO2 100%   There is no height or weight on file to calculate BMI.  Physical Exam   GENERAL: healthy, alert and no distress  NECK: no adenopathy, no asymmetry, masses, or scars " and thyroid normal to palpation  RESP: lungs clear to auscultation - no rales, rhonchi or wheezes  CV: regular rate and rhythm, normal S1 S2, no S3 or S4, no murmur, click or rub, no peripheral edema and peripheral pulses strong  ABDOMEN: soft, nontender, no hepatosplenomegaly, no masses and bowel sounds normal  MS: no gross musculoskeletal defects noted, no edema

## 2023-11-02 NOTE — PATIENT INSTRUCTIONS
As discussed , please follow up with GI as outpatient in 2 weeks and also Urology as scheduled for your hematuria.     ===============================================

## 2023-11-03 ENCOUNTER — OFFICE VISIT (OUTPATIENT)
Dept: UROLOGY | Facility: CLINIC | Age: 63
End: 2023-11-03
Attending: STUDENT IN AN ORGANIZED HEALTH CARE EDUCATION/TRAINING PROGRAM
Payer: COMMERCIAL

## 2023-11-03 VITALS
OXYGEN SATURATION: 97 % | TEMPERATURE: 98.7 F | DIASTOLIC BLOOD PRESSURE: 82 MMHG | BODY MASS INDEX: 23.14 KG/M2 | SYSTOLIC BLOOD PRESSURE: 121 MMHG | HEART RATE: 81 BPM | WEIGHT: 144 LBS | HEIGHT: 66 IN

## 2023-11-03 DIAGNOSIS — R31.29 MICROSCOPIC HEMATURIA: ICD-10-CM

## 2023-11-03 LAB
ALBUMIN UR-MCNC: NEGATIVE MG/DL
APPEARANCE UR: CLEAR
BACTERIA #/AREA URNS HPF: ABNORMAL /HPF
BILIRUB UR QL STRIP: NEGATIVE
COLOR UR AUTO: YELLOW
GLUCOSE UR STRIP-MCNC: NEGATIVE MG/DL
HGB UR QL STRIP: ABNORMAL
KETONES UR STRIP-MCNC: NEGATIVE MG/DL
LEUKOCYTE ESTERASE UR QL STRIP: NEGATIVE
NITRATE UR QL: NEGATIVE
PH UR STRIP: 6 [PH] (ref 5–7)
RBC #/AREA URNS AUTO: ABNORMAL /HPF
SP GR UR STRIP: 1.01 (ref 1–1.03)
SQUAMOUS #/AREA URNS AUTO: ABNORMAL /LPF
UROBILINOGEN UR STRIP-ACNC: 0.2 E.U./DL
WBC #/AREA URNS AUTO: ABNORMAL /HPF

## 2023-11-03 PROCEDURE — 99202 OFFICE O/P NEW SF 15 MIN: CPT | Performed by: STUDENT IN AN ORGANIZED HEALTH CARE EDUCATION/TRAINING PROGRAM

## 2023-11-03 PROCEDURE — 81001 URINALYSIS AUTO W/SCOPE: CPT | Performed by: STUDENT IN AN ORGANIZED HEALTH CARE EDUCATION/TRAINING PROGRAM

## 2023-11-03 ASSESSMENT — PAIN SCALES - GENERAL: PAINLEVEL: NO PAIN (0)

## 2023-11-03 NOTE — PROGRESS NOTES
"        Chief Complaint:   Microscopic hematuria         History of Present Illness:   Carina Chaney is a 63 year old female with a history of proctocolitis, BPPV, Crohn's disease who presents for evaluation of microscopic hematuria.     The patient denies any gross hematuria. She currently denies any dysuria, pyuria, hesitancy, intermittency, feelings of incomplete emptying, or any recent history of urinary tract infections or stones.    She has smoked infrequently throughout her life.     She had a CT abdomen pelvis with contrast on 10/21/2023 which showed a normal urinary tract.          Past Medical History:     Past Medical History:   Diagnosis Date    Colitis     Crohn disease (H)             Past Surgical History:     Past Surgical History:   Procedure Laterality Date    ENT SURGERY      GYN SURGERY              Medications     Current Outpatient Medications   Medication    oxyCODONE (ROXICODONE) 5 MG tablet     No current facility-administered medications for this visit.            Allergies:   Hydromorphone, Morphine, Ciprofloxacin, Codeine, Prednisone, and Sodium phosphate         Review of Systems:  From intake questionnaire   Negative 14 system review except as noted on HPI, nurse's note.         Physical Exam:   Patient is a 63 year old  female   Vitals: Blood pressure 121/82, pulse 81, temperature 98.7  F (37.1  C), temperature source Tympanic, height 1.676 m (5' 6\"), weight 65.3 kg (144 lb), SpO2 97%, not currently breastfeeding.  General Appearance Adult: Alert, no acute distress, oriented.  Lungs: Non-labored breathing.  Heart: No obvious jugular venous distension present.  Neuro: Alert, oriented, speech and mentation normal      Labs and Pathology:    I personally reviewed all applicable laboratory data and went over findings with patient  Significant for:    CBC RESULTS:  Recent Labs   Lab Test 10/24/23  0815 10/22/23  0723 10/21/23  1844 09/24/22  0319   WBC 6.8 9.3 9.3 10.6   HGB 11.8 11.4* " 13.6 12.8    219 263 303        BMP RESULTS:  Recent Labs   Lab Test 10/24/23  0815 10/23/23  1107 10/23/23  1003 10/22/23  0723 10/21/23  1844 08/27/21  0839 01/26/21  0956 03/19/17  0605 03/18/17  0600 03/16/17  2331    142  --  142 139   < > 140 145* 146* 140   POTASSIUM 3.7 3.5  3.5  --  3.5 3.4   < > 4.3 3.8 3.7 3.7   CHLORIDE 103 107  --  108* 99   < > 108 111* 114* 102   CO2 26 25  --  24 23   < > 28 29 23 27   ANIONGAP 10 10  --  10 17*   < > 4 5 9 11   * 103* 91 136* 148*   < > 88 96 92 133*   BUN 10.2 12.3  --  8.7 11.9   < > 12 5* 8 13   CR 0.61 0.65  --  0.58 0.57   < > 0.61 0.69 0.59 0.67   GFRESTIMATED >90 >90  --  >90 >90   < > >90 89 >90  Non  GFR Calc   >90  Non  GFR Calc     GFRESTBLACK  --   --   --   --   --   --  >90 >90   GFR Calc   >90   GFR Calc   >90   GFR Calc     STACEY 9.2 9.1  --  9.1 10.2   < > 9.6 8.8 8.7 9.6    < > = values in this interval not displayed.       UA RESULTS:   Recent Labs   Lab Test 10/21/23  1846 03/22/23  1030 09/24/22  0319   SG 1.023 1.015 1.020   URINEPH 7.0 6.5 8.0*   NITRITE Negative Negative Positive*   RBCU 10* 0-2 0   WBCU 3 0-5 10*              Assessment and Plan:     Assessment: Ms. Carina Chaney is a pleasant 63 year old female with microscopic hematuria that was present on recent UA. She states she did have a hemorrhoid at that time, which could explain findings. The differential diagnosis at this point includes stone disease, infection, vaginal contaminant, urothelial malignancy, renal disorder versus another yet unknown diagnosis.    Repeat UA today had 0-2 RBCs, not meeting criteria for microscopic hematuria. Further evaluation is not required.     Plan:  Follow up with urology as needed.       RACIEL CARTER PA-C  Department of Urology

## 2023-11-03 NOTE — NURSING NOTE
"Initial /82   Pulse 81   Temp 98.7  F (37.1  C) (Tympanic)   Ht 1.676 m (5' 6\")   Wt 65.3 kg (144 lb)   LMP  (LMP Unknown)   SpO2 97%   BMI 23.24 kg/m   Estimated body mass index is 23.24 kg/m  as calculated from the following:    Height as of this encounter: 1.676 m (5' 6\").    Weight as of this encounter: 65.3 kg (144 lb). .  Katie MARIO CMA 11/03/23 11:03 AM  "

## 2023-11-14 ENCOUNTER — TRANSFERRED RECORDS (OUTPATIENT)
Dept: HEALTH INFORMATION MANAGEMENT | Facility: CLINIC | Age: 63
End: 2023-11-14
Payer: COMMERCIAL

## 2023-12-26 ENCOUNTER — PATIENT OUTREACH (OUTPATIENT)
Dept: CARE COORDINATION | Facility: CLINIC | Age: 63
End: 2023-12-26
Payer: COMMERCIAL

## 2024-01-02 ENCOUNTER — OFFICE VISIT (OUTPATIENT)
Dept: FAMILY MEDICINE | Facility: CLINIC | Age: 64
End: 2024-01-02
Payer: COMMERCIAL

## 2024-01-02 VITALS
WEIGHT: 148 LBS | BODY MASS INDEX: 23.89 KG/M2 | TEMPERATURE: 97.8 F | HEART RATE: 78 BPM | OXYGEN SATURATION: 97 % | SYSTOLIC BLOOD PRESSURE: 120 MMHG | DIASTOLIC BLOOD PRESSURE: 78 MMHG

## 2024-01-02 DIAGNOSIS — K50.819 CROHN'S DISEASE OF SMALL AND LARGE INTESTINES WITH COMPLICATION (H): ICD-10-CM

## 2024-01-02 DIAGNOSIS — M54.50 ACUTE LEFT-SIDED LOW BACK PAIN WITHOUT SCIATICA: Primary | ICD-10-CM

## 2024-01-02 PROCEDURE — 99213 OFFICE O/P EST LOW 20 MIN: CPT | Performed by: FAMILY MEDICINE

## 2024-01-02 RX ORDER — OXYCODONE HYDROCHLORIDE 5 MG/1
5 TABLET ORAL EVERY 6 HOURS PRN
COMMUNITY
End: 2024-07-19

## 2024-01-02 ASSESSMENT — PAIN SCALES - GENERAL: PAINLEVEL: MILD PAIN (3)

## 2024-01-02 ASSESSMENT — ENCOUNTER SYMPTOMS: BACK PAIN: 1

## 2024-01-02 NOTE — PROGRESS NOTES
Assessment & Plan       ICD-10-CM    1. Acute left-sided low back pain without sciatica  M54.50       2. Crohn's disease of small and large intestines with complication (H)  K50.819         Patient has acute left-sided low back pain.  She has  not been using any pain medications as she was told not to use ibuprofen as she has Crohn's disease.  She tells that Tylenol does not work for her.  She has tried muscle relaxer, tizanidine last night and noticed no benefit from that.  She has some oxycodone but that makes her very nauseous so she did not use it for this pain.  It was given to her for Crohn's flareup.      She has tolerated ibuprofen well in the past.  She does not have an active Crohn's flareup.  She does not have any stomach ulcers or heartburn symptoms at this time.  I recommended to take a few doses of ibuprofen to get her some inflammation and pain relief.  Having Crohn's disease is not a contraindication to use ibuprofen unless there is an active flareup.  Tylenol is another option to try in the situation.  Recommending to resume the use of tizanidine at night.  Apply heat.  Try over-the-counter diclofenac gel 4 times a day if oral ibuprofen is not tolerated for some reason.    If symptoms or not improving over the next 4 to 6 weeks, we may need to consider physical therapy for her.  Patient verbalized understanding and agreement                   Melody Samaniego MD  [facility] 63 year [facility], presenting for the following health issues:  Back Pain        1/2/2024     1:01 PM   Additional Questions   Roomed by Julio   Accompanied by N/A       History of Present Illness       Back Pain:  She presents for follow up of back pain. Patient's back pain is a new problem.    Original cause of back pain: other  First noticed back pain: in the last week  Patient feels back pain: comes and goesLocation of back pain:  Left lower back  Description of back pain: dull  ache and sharp  Back pain spreads: left buttocks    Since patient first noticed back pain, pain is: always present, but gets better and worse  Does back pain interfere with her job:  Yes  On a scale of 1-10 (10 being the worst), patient describes pain as:  6  What makes back pain worse: certain positions and other   Acupuncture: not tried  Acetaminophen: not tried  Activity or exercise: helpful  Chiropractor:  Not tried  Cold: helpful  Heat: helpful  Massage: helpful  Muscle relaxants: not helpful  NSAIDS: not tried  Opioids: not helpful  Physical Therapy: not tried  Rest: not helpful  Steroid Injection: not tried  Stretching: helpful  Surgery: not tried  TENS unit: not tried  Topical pain relievers: not tried  Other healthcare providers patient is seeing for back pain: None    She eats 2-3 servings of fruits and vegetables daily.She consumes 1 sweetened beverage(s) daily.She exercises with enough effort to increase her heart rate 10 to 19 minutes per day.  She exercises with enough effort to increase her heart rate 4 days per week.   She is taking medications regularly.                 Review of Systems   Musculoskeletal:  Positive for back pain.            Objective    /78   Pulse 78   Temp 97.8  F (36.6  C) (Tympanic)   Wt 67.1 kg (148 lb)   LMP  (LMP Unknown)   SpO2 97%   BMI 23.89 kg/m    Body mass index is 23.89 kg/m .  Physical Exam   GENERAL: healthy, alert and no distress  NECK: no adenopathy, no asymmetry, masses, or scars and thyroid normal to palpation  RESP: lungs clear to auscultation - no rales, rhonchi or wheezes  CV: regular rate and rhythm, normal S1 S2, no S3 or S4, no murmur, click or rub, no peripheral edema and peripheral pulses strong  ABDOMEN: soft, nontender, no hepatosplenomegaly, no masses and bowel sounds normal  MS: no gross musculoskeletal defects noted, no edema

## 2024-01-02 NOTE — COMMUNITY RESOURCES LIST (ENGLISH)
01/02/2024   [facility]  N/A  For questions about this resource list or additional care needs, please contact your primary care clinic or care manager.  Phone: [phone]   Email: N/A   Address: [facility] [place] [address]   Hours: N/A        Food and Nutrition       Food pantry  1  [employer] ([employer]) Distance: 1 miles      In-Person   [address] [place] [address]  Language: English  Hours: Mon - Sun Open 24 Hours  Fees: Free   Phone: (528) 545-9159 Email: [email or web address] Website: http://www.[facility].[employer] F[employer]     2  [facility] - F[employer] for All Distance: 2.32 miles      Pickup   [address] [address]  Language: English, Nauruan  Hours: Fri 10:00 AM - 1:00 PM  Fees: Self Pay   Phone: (596) 146-9177 Email: [facility] [place] [facility] Website: https://www.[facility]/vqwsfhvhv-ibspjbzik-lsifhj     SNAP application assistance  3  [facility] Distance: 2.46 miles      In-Person, Phone/Virtual   [address] [place] [address]  Language: English, Nauruan  Hours: Mon - Fri 9:00 AM - 4:30 PM  Fees: Free   Phone: (278) 350-6248 Ext.113 Email: info@[facility].org Website: https://[employer].org     4  [employer] [facility] Distance: 6.82 miles      In-Person   [address] [place] [address]  Language: English, Nauruan  Hours: Mon - Fri 8:30 AM - 5:00 PM  Fees: Free   Phone: (786) 129-6683 Email: info@[employer].org Website: http://www.clues.org/     Soup kitchen or free meals  5  [employer] Distance: 0.9 miles      Pickup   [address], [place] [address]  Language: English  Hours: Sat 5:00 PM - 7:00 PM , Sun 5:30 PM - 6:30 PM  Fees: Free   Phone: (204) 837-6303 Email: office@[facility] Website: http://[facility]/     6  [facility] [employer] Distance: 1.37  [facility]   [address] [place] [address]  Language: English  Hours: Mon - Fri 5:00 PM - 6:00 PM  Fees: Free   Phone: (554) 811-6693 Email: contactus@[employer] Website: https://www.[employer].org/          Important Numbers & Websites       Emergency Services   911  [facility] Services   311  Poison Control   (648) 454-8230  Suicide Prevention Lifeline   (447) 748-9643 (TALK)  Child Abuse Hotline   (543) 810-1992 (4-A-Child)  Sexual Assault Hotline   (197) 424-9567 (HOPE)  National Runaway Safeline   (360) 561-6617 (RUNAWAY)  All-Options Talkline   (929) 166-7863  Substance Abuse Referral   (205) 788-9396 (HELP)

## 2024-01-13 ENCOUNTER — HEALTH MAINTENANCE LETTER (OUTPATIENT)
Age: 64
End: 2024-01-13

## 2024-01-16 ASSESSMENT — ENCOUNTER SYMPTOMS
NAUSEA: 0
SHORTNESS OF BREATH: 0
HEADACHES: 0
ARTHRALGIAS: 0
SORE THROAT: 0
CHILLS: 0
DYSURIA: 0
WEAKNESS: 0
DIZZINESS: 0
BREAST MASS: 0
FREQUENCY: 0
DIARRHEA: 0
NERVOUS/ANXIOUS: 1
COUGH: 0
HEMATURIA: 0
MYALGIAS: 0
JOINT SWELLING: 0
CONSTIPATION: 1
PARESTHESIAS: 0
PALPITATIONS: 0
ABDOMINAL PAIN: 1
EYE PAIN: 0
FEVER: 0
HEARTBURN: 0
HEMATOCHEZIA: 0

## 2024-01-18 ENCOUNTER — OFFICE VISIT (OUTPATIENT)
Dept: FAMILY MEDICINE | Facility: CLINIC | Age: 64
End: 2024-01-18
Attending: INTERNAL MEDICINE
Payer: COMMERCIAL

## 2024-01-18 ENCOUNTER — MYC MEDICAL ADVICE (OUTPATIENT)
Dept: FAMILY MEDICINE | Facility: CLINIC | Age: 64
End: 2024-01-18

## 2024-01-18 VITALS
SYSTOLIC BLOOD PRESSURE: 116 MMHG | BODY MASS INDEX: 23.89 KG/M2 | RESPIRATION RATE: 11 BRPM | DIASTOLIC BLOOD PRESSURE: 70 MMHG | HEART RATE: 80 BPM | OXYGEN SATURATION: 99 % | HEIGHT: 66 IN | TEMPERATURE: 97 F

## 2024-01-18 DIAGNOSIS — K50.819 CROHN'S DISEASE OF SMALL AND LARGE INTESTINES WITH COMPLICATION (H): ICD-10-CM

## 2024-01-18 DIAGNOSIS — Z12.31 VISIT FOR SCREENING MAMMOGRAM: ICD-10-CM

## 2024-01-18 DIAGNOSIS — Z00.00 ROUTINE GENERAL MEDICAL EXAMINATION AT A HEALTH CARE FACILITY: Primary | ICD-10-CM

## 2024-01-18 DIAGNOSIS — G89.29 CHRONIC LEFT-SIDED LOW BACK PAIN WITHOUT SCIATICA: ICD-10-CM

## 2024-01-18 DIAGNOSIS — M85.80 AGE-RELATED BONE LOSS: ICD-10-CM

## 2024-01-18 DIAGNOSIS — R87.810 CERVICAL HIGH RISK HPV (HUMAN PAPILLOMAVIRUS) TEST POSITIVE: ICD-10-CM

## 2024-01-18 DIAGNOSIS — Z13.220 ENCOUNTER FOR SCREENING FOR LIPID DISORDER: ICD-10-CM

## 2024-01-18 DIAGNOSIS — K52.9 PROCTOCOLITIS: ICD-10-CM

## 2024-01-18 DIAGNOSIS — M54.50 CHRONIC LEFT-SIDED LOW BACK PAIN WITHOUT SCIATICA: ICD-10-CM

## 2024-01-18 PROBLEM — R10.32 LLQ ABDOMINAL PAIN: Status: RESOLVED | Noted: 2023-10-21 | Resolved: 2024-01-18

## 2024-01-18 PROBLEM — H81.11 BPPV (BENIGN PAROXYSMAL POSITIONAL VERTIGO), RIGHT: Status: RESOLVED | Noted: 2023-03-18 | Resolved: 2024-01-18

## 2024-01-18 PROCEDURE — 99214 OFFICE O/P EST MOD 30 MIN: CPT | Mod: 25 | Performed by: INTERNAL MEDICINE

## 2024-01-18 PROCEDURE — 99396 PREV VISIT EST AGE 40-64: CPT | Performed by: INTERNAL MEDICINE

## 2024-01-18 ASSESSMENT — ENCOUNTER SYMPTOMS
EYE PAIN: 0
DIZZINESS: 0
SHORTNESS OF BREATH: 0
HEMATURIA: 0
MYALGIAS: 0
ARTHRALGIAS: 0
DYSURIA: 0
WEAKNESS: 0
JOINT SWELLING: 0
DIARRHEA: 0
ABDOMINAL PAIN: 1
PALPITATIONS: 0
SORE THROAT: 0
FREQUENCY: 0
NAUSEA: 0
COUGH: 0
HEADACHES: 0
CONSTIPATION: 1
NERVOUS/ANXIOUS: 1
CHILLS: 0
FEVER: 0

## 2024-01-18 ASSESSMENT — PAIN SCALES - GENERAL: PAINLEVEL: NO PAIN (0)

## 2024-01-18 NOTE — PROGRESS NOTES
Preventive Care Visit  [facility] CARLENE Hernandez MD, Internal Medicine  2024       SUBJECTIVE:   Carina is a 63 year old, presenting for the following:  Physical        2024    11:26 AM   Additional Questions   Roomed by Elizabeth MENDEZ     Healthy Habits:     Getting at least 3 servings of Calcium per day:  Yes    Bi-annual eye exam:  NO    Dental care twice a year:  Yes    Sleep apnea or symptoms of sleep apnea:  None    Diet:  Other    Frequency of exercise:  4-5 days/week    Duration of exercise:  30-45 minutes    Taking medications regularly:  Yes    Additional concerns today:  No      Social History     Tobacco Use    Smoking status: Former     Types: Cigarettes, Other     Passive exposure: Never    Smokeless tobacco: Never    Tobacco comments:     Started at 17 yrs age 1 cig/day >> 3 Cig/day and stopped at 32 yrs age.    Substance Use Topics    Alcohol use: Yes     Comment: Very little, less than a glass of wine per month             2024    11:51 AM   Alcohol Use   Prescreen: >3 drinks/day or >7 drinks/week? No          No data to display              Reviewed orders with patient.  Reviewed health maintenance and updated orders accordingly - Yes  Lab work is in process  Labs reviewed in EPIC    Breast Cancer Screenin/17/2021    11:08 AM   Breast CA Risk Assessment (FHS-7)   Do you have a family history of breast, colon, or ovarian cancer? No / Unknown       click delete button to remove this line now  Mammogram Screening: Recommended mammography every 1-2 years with patient discussion and risk factor consideration  Pertinent mammograms are reviewed under the imaging tab.    History of abnormal Pap smear: YES - other categories - see link Cervical Cytology Screening Guidelines      Latest Ref Rng & Units 2022     7:29 AM   PAP / HPV   PAP  Negative for Intraepithelial Lesion or Malignancy (NILM)    HPV 16 DNA Negative Negative    HPV 18 DNA Negative  "Negative    Other HR HPV Negative Positive      Reviewed and updated as needed this visit by clinical staff   Tobacco  Allergies  Meds  Problems  Med Hx  Surg Hx  Fam Hx          Reviewed and updated as needed this visit by Provider   Tobacco  Allergies  Meds  Problems  Med Hx  Surg Hx  Fam Hx          Past Medical History:   Diagnosis Date    Colitis     Crohn disease (H)       Past Surgical History:   Procedure Laterality Date    ENT SURGERY      GYN SURGERY       OB History    Para Term  AB Living   0 0 0 0 0 0   SAB IAB Ectopic Multiple Live Births   0 0 0 0 0     Review of Systems   Constitutional:  Negative for chills and fever.   HENT:  Negative for congestion, ear pain, hearing loss and sore throat.    Eyes:  Negative for pain and visual disturbance.   Respiratory:  Negative for cough and shortness of breath.    Cardiovascular:  Negative for chest pain and palpitations.   Gastrointestinal:  Positive for abdominal pain and constipation. Negative for diarrhea and nausea.   Genitourinary:  Negative for dysuria, frequency, genital sores, hematuria, pelvic pain, urgency, vaginal bleeding and vaginal discharge.   Musculoskeletal:  Negative for arthralgias, joint swelling and myalgias.   Skin:  Negative for rash.   Neurological:  Negative for dizziness, weakness and headaches.   Psychiatric/Behavioral:  The patient is nervous/anxious.        Review of Systems  Constitutional, HEENT, cardiovascular, pulmonary, GI, , musculoskeletal, neuro, skin, endocrine and psych systems are negative, except as otherwise noted.    OBJECTIVE:   /70 (BP Location: Left arm, Patient Position: Sitting, Cuff Size: Adult Regular)   Pulse 80   Temp 97  F (36.1  C) (Tympanic)   Resp 11   Ht 1.676 m (5' 6\")   LMP  (LMP Unknown)   SpO2 99%   BMI 23.89 kg/m     Estimated body mass index is 23.89 kg/m  as calculated from the following:    Height as of this encounter: 1.676 m (5' 6\").    Weight as of " 1/2/24: 67.1 kg (148 lb).  Physical Exam  GENERAL: alert and no distress  EYES: Eyes grossly normal to inspection, PERRL and conjunctivae and sclerae normal  HENT: ear canals and TM's normal, nose and mouth without ulcers or lesions  NECK: no adenopathy, no asymmetry, masses, or scars  BREAST : Deferred , Mammogram.   RESP: lungs clear to auscultation - no rales, rhonchi or wheezes  CV: regular rate and rhythm, normal S1 S2, no S3 or S4, no murmur, click or rub, no peripheral edema  ABDOMEN: soft, nontender, no hepatosplenomegaly, no masses and bowel sounds normal  MS: no gross musculoskeletal defects noted, no edema  SKIN: no suspicious lesions or rashes  NEURO: Normal strength and tone, mentation intact and speech normal  PSYCH: mentation appears normal, affect normal/bright  LYMPH: no cervical, supraclavicular, axillary, or inguinal adenopathy    Diagnostic Test Results:  Labs reviewed in Epic    ASSESSMENT/PLAN:     Assessment and Plan  1. Routine general medical examination at a health care facility  Last seen patient in 11/2023 for hospital follow-up visit at that time given patient's proctocolitis due to Crohn's disease.  Follows [facility] as outpatient, does have microscopic hematuria for which she follows urology.  Patient is here for annual wellness visit, last lab work in October 2023 showing normal BMP, CBC, patient does take cannabis with UDS positive in the past.  Can recheck labs along with lipid panel and vitamin D.  - MA SCREENING DIGITAL BILAT - Future  (s+30); Future  - Comprehensive metabolic panel (BMP + Alb, Alk Phos, ALT, AST, Total. Bili, TP); Future  - CBC with platelets; Future  - Lipid panel reflex to direct LDL Fasting; Future  - Vitamin D deficiency screening; Future    2. Crohn's disease of small and large intestines with complication (H)  3. Proctocolitis  - Comprehensive metabolic panel (BMP + Alb, Alk Phos, ALT, AST, Total. Bili, TP); Future  - CBC with platelets; Future    4. Chronic  left-sided low back pain without sciatica  Chronic problem, Uncontrolled inspite of pt conservative management though improved from he past with current Zanaflex as needed. Shared decision for Physical therapy referral which I went ahead and placed.   - Comprehensive metabolic panel (BMP + Alb, Alk Phos, ALT, AST, Total. Bili, TP); Future  - Physical Therapy Referral; Future    5. Cervical high risk HPV (human papillomavirus) test positive  Pt following OBGYN - PAP was done 12/2022 will get the records. Pt has upcoming PAP in one month     6. Visit for screening mammogram  - MA SCREENING DIGITAL BILAT - Future  (s+30); Future    7. Encounter for screening for lipid disorder  - Lipid panel reflex to direct LDL Fasting; Future    8. Age-related bone loss  - Vitamin D deficiency screening; Future         Please note that this note consists of symbols derived from keyboarding, dictation and/or voice recognition software. As a result, there may be errors in the script that have gone undetected. Please consider this when interpreting information found in this chart.    Patient Instructions   As discussed , please do NON fasting labs as opted.     Will await for your PAP results from your OBGYN as discussed.     Placed referral to physical therapy for your back pain.     ===========================    Preventive Health Recommendations  Female Ages 50 - 64    Yearly exam: See your health care provider every year in order to  Review health changes.   Discuss preventive care.    Review your medicines if your doctor has prescribed any.    Get a Pap test every three years (unless you have an abnormal result and your provider advises testing more often).  If you get Pap tests with HPV test, you only need to test every 5 years, unless you have an abnormal result.   You do not need a Pap test if your uterus was removed (hysterectomy) and you have not had cancer.  You should be tested each year for STDs (sexually transmitted  diseases) if you're at risk.   Have a mammogram every 1 to 2 years.  Have a colonoscopy at age 45, or have a yearly FIT test (stool test). These exams screen for colon cancer.    Have a cholesterol test every 5 years, or more often if advised.  Have a diabetes test (fasting glucose) every three years. If you are at risk for diabetes, you should have this test more often.   If you are at risk for osteoporosis (brittle bone disease), think about having a bone density scan (DEXA).    Shots: Get a flu shot each year. Get a tetanus shot every 10 years.    Nutrition:   Eat at least 5 servings of fruits and vegetables each day.  Eat whole-grain bread, whole-wheat pasta and brown rice instead of white grains and rice.  Get adequate Calcium and Vitamin D.     Lifestyle  Exercise at least 150 minutes a week (30 minutes a day, 5 days a week). This will help you control your weight and prevent disease.  Limit alcohol to one drink per day.  No smoking.   Wear sunscreen to prevent skin cancer.   See your dentist every six months for an exam and cleaning.  See your eye doctor every 1 to 2 years.    Return in about 6 months (around 7/18/2024), or if symptoms worsen or fail to improve, for Follow up of last visit, If symptoms persist.    Suzie Hernandez MD  [facility]        Patient has been advised of split billing requirements and indicates understanding: Yes      Counseling  Reviewed preventive health counseling, as reflected in patient instructions  Special attention given to:        Regular exercise       Healthy diet/nutrition       Vision screening       Hearing screening       Osteoporosis prevention/bone health        She reports that she has quit smoking. Her smoking use included cigarettes and other. She has never been exposed to tobacco smoke. She has never used smokeless tobacco.  Nicotine/Tobacco Cessation Plan  Information offered: Patient not interested at this time          Signed  Electronically by: Suzie Hernandez MD  Answers submitted by the patient for this visit:  Annual Preventive Visit (Submitted on 1/16/2024)  Chief Complaint: Annual Exam:  Blood in stool: No  heartburn: No  peripheral edema: No  mood changes: No  Skin sensation changes: No  tenderness: No  breast mass: No  breast discharge: No

## 2024-01-18 NOTE — PATIENT INSTRUCTIONS
As discussed , please do NON fasting labs as opted.     Will await for your PAP results from your OBGYN as discussed.     Placed referral to physical therapy for your back pain.     ===========================    Preventive Health Recommendations  Female Ages 50 - 64    Yearly exam: See your health care provider every year in order to  Review health changes.   Discuss preventive care.    Review your medicines if your doctor has prescribed any.    Get a Pap test every three years (unless you have an abnormal result and your provider advises testing more often).  If you get Pap tests with HPV test, you only need to test every 5 years, unless you have an abnormal result.   You do not need a Pap test if your uterus was removed (hysterectomy) and you have not had cancer.  You should be tested each year for STDs (sexually transmitted diseases) if you're at risk.   Have a mammogram every 1 to 2 years.  Have a colonoscopy at age 45, or have a yearly FIT test (stool test). These exams screen for colon cancer.    Have a cholesterol test every 5 years, or more often if advised.  Have a diabetes test (fasting glucose) every three years. If you are at risk for diabetes, you should have this test more often.   If you are at risk for osteoporosis (brittle bone disease), think about having a bone density scan (DEXA).    Shots: Get a flu shot each year. Get a tetanus shot every 10 years.    Nutrition:   Eat at least 5 servings of fruits and vegetables each day.  Eat whole-grain bread, whole-wheat pasta and brown rice instead of white grains and rice.  Get adequate Calcium and Vitamin D.     Lifestyle  Exercise at least 150 minutes a week (30 minutes a day, 5 days a week). This will help you control your weight and prevent disease.  Limit alcohol to one drink per day.  No smoking.   Wear sunscreen to prevent skin cancer.   See your dentist every six months for an exam and cleaning.  See your eye doctor every 1 to 2 years.

## 2024-01-18 NOTE — TELEPHONE ENCOUNTER
Provider, please read the patient My Chart message and advise the triage staff.       Was not sure if you need the information based on her OV today.         Alina Chapa RN  [facility]

## 2024-01-22 ENCOUNTER — THERAPY VISIT (OUTPATIENT)
Dept: PHYSICAL THERAPY | Facility: CLINIC | Age: 64
End: 2024-01-22
Attending: INTERNAL MEDICINE
Payer: COMMERCIAL

## 2024-01-22 DIAGNOSIS — M54.50 CHRONIC LEFT-SIDED LOW BACK PAIN WITHOUT SCIATICA: ICD-10-CM

## 2024-01-22 DIAGNOSIS — G89.29 CHRONIC LEFT-SIDED LOW BACK PAIN WITHOUT SCIATICA: ICD-10-CM

## 2024-01-22 PROCEDURE — 97110 THERAPEUTIC EXERCISES: CPT | Mod: GP

## 2024-01-22 PROCEDURE — 97161 PT EVAL LOW COMPLEX 20 MIN: CPT | Mod: GP

## 2024-01-22 PROCEDURE — 97112 NEUROMUSCULAR REEDUCATION: CPT | Mod: GP

## 2024-01-22 NOTE — PROGRESS NOTES
PHYSICAL THERAPY EVALUATION  Type of Visit: Evaluation    See electronic medical record for Abuse and Falls Screening details.    Subjective       Presenting condition or subjective complaint: Strained muscle in illiac crest - lower left side of back 12/30/2023  Patient reports to outpatient physical therapy with L side LBP that started on 12/30/2023. She pushed a couch at a wrong angle. She felt an immediate sharp twinge. In 2003/04 she tore her L meniscus and after being in an immobilizer she started to have a similar L side LBP. She also had a double hamstring tear and had L side LBP along with this. She reports that she gets some relief with the stretches she has been doing - hip flexor/quad stretch. Was difficult to find a comfortable position sleeping at night originally for the first couple weeks, but now she is doing much better than then.     Goals: move with this less pain, dancing, running    Date of onset: 12/30/23    Relevant medical history:     Past Medical History:   Diagnosis Date    Colitis     Crohn disease (H)      Dates & types of surgery: None that are relevant  Past Surgical History:   Procedure Laterality Date    ENT SURGERY      GYN SURGERY       Prior diagnostic imaging/testing results: MRI     Prior therapy history for the same diagnosis, illness or injury: Yes 2004, physical therapy    Prior Level of Function  Transfers: Independent  Ambulation: Independent  ADL: Independent  IADL: Driving, Finances, Housekeeping, Laundry, Meal preparation, Medication management, Work, Yard work    Living Environment  Social support: Alone   Type of home: Apartment/condo   Stairs to enter the home: Yes 35 Is there a railing: Yes   Ramp: No   Stairs inside the home: Yes 35 Is there a railing: Yes   Help at home: None; Other  Equipment owned:       Employment: Yes /   Hobbies/Interests: Playing music, dancing, concerts, movies & coffee w/ friends, walking outside, [employer] [facility], trying new things.    Patient goals for therapy: Review PT exercises to stretch associated muscle groups to complete healing so I can do yoga, run and dance again.    Pain assessment: Pain present     Objective   LUMBAR SPINE EVALUATION  PAIN: Pain Level at Rest: 1/10  Pain Level with Use: 3/10  POSTURE: WFL  GAIT:   Assistive Device(s): None  Gait Deviations:  slight reduced lumbopelvic movement L side    ROM:  flexion = significant limitation (due to fear of movement and bilateral hamstring tightness), extension = WNL, side bend = limited R side, WNL L side, side glide: limited R side glide + L side, WNL L side glide  lumbar flexion = moderate improvement after session  Hip ROM: WNL bilaterally ER and IR, slight limitation flexion L>R side  STRENGTH:  hip flexion: 5- L side with heightened fear response that it would reproduce L LBP, L hip abd: 3+   MYOTOMES:  not assessed  DERMATOMES:  patient denies numbness and tingling  NEURAL TENSION:  not assessed    FUNCTIONAL TESTS: Double Leg Squat: Anterior knee translation and Improper use of glutes/hips  PALPATION:  tenderness L QL    Assessment & Plan   CLINICAL IMPRESSIONS  Medical Diagnosis: L side LBP    Treatment Diagnosis:     Impression/Assessment: Patient is a 63 year old female with L side LBP complaints.  The following significant findings have been identified: Pain, Decreased ROM/flexibility, Decreased strength, Impaired gait, Impaired muscle performance, and Decreased activity tolerance. These impairments interfere with their ability to perform self care tasks, recreational activities, household chores, household mobility, and community mobility as compared to previous level of function.     Clinical Decision Making (Complexity):  Clinical Presentation: Stable/Uncomplicated  Clinical Presentation Rationale: based on medical and personal factors listed in PT evaluation  Clinical Decision Making (Complexity): Low  complexity    PLAN OF CARE  Treatment Interventions:  Modalities: E-stim, Hot Pack  Interventions: Gait Training, Manual Therapy, Neuromuscular Re-education, Therapeutic Activity, Therapeutic Exercise, Self-Care/Home Management    Long Term Goals     PT Goal 1  Goal Identifier: running  Goal Description: patient will be able to run for 20+ min with 1/10 pain or less  Rationale: to maximize safety and independence with performance of ADLs and functional tasks;to maximize safety and independence within the home;to maximize safety and independence with self cares (return to physical activity)  Target Date: 04/15/24  PT Goal 2  Goal Identifier: ambulation  Goal Description: patient will be able to walk for 40 min with 1/10 pain or less  Rationale: to maximize safety and independence within the home;to maximize safety and independence with performance of ADLs and functional tasks;to maximize safety and independence with self cares (return to run)  Target Date: 03/18/24      Frequency of Treatment: 1x/wk progressing to 1 x every other week  Duration of Treatment: 2-3 months    Recommended Referrals to Other Professionals: n/a  Education Assessment:   Learner/Method: Patient    Risks and benefits of evaluation/treatment have been explained.   Patient/Family/caregiver agrees with Plan of Care.     Evaluation Time:     PT Eval, Low Complexity Minutes (88482): 12     Signing Clinician: Suzanne Alcantar PT

## 2024-01-23 ENCOUNTER — PATIENT OUTREACH (OUTPATIENT)
Dept: CARE COORDINATION | Facility: CLINIC | Age: 64
End: 2024-01-23
Payer: COMMERCIAL

## 2024-01-26 ENCOUNTER — ALLIED HEALTH/NURSE VISIT (OUTPATIENT)
Dept: FAMILY MEDICINE | Facility: CLINIC | Age: 64
End: 2024-01-26
Payer: COMMERCIAL

## 2024-01-26 DIAGNOSIS — Z23 NEED FOR VACCINATION: Primary | ICD-10-CM

## 2024-01-26 PROCEDURE — 99207 PR NO CHARGE NURSE ONLY: CPT

## 2024-01-26 PROCEDURE — 90471 IMMUNIZATION ADMIN: CPT

## 2024-01-26 PROCEDURE — 90678 RSV VACC PREF BIVALENT IM: CPT

## 2024-01-30 ENCOUNTER — THERAPY VISIT (OUTPATIENT)
Dept: PHYSICAL THERAPY | Facility: CLINIC | Age: 64
End: 2024-01-30
Payer: COMMERCIAL

## 2024-01-30 DIAGNOSIS — M54.50 CHRONIC LEFT-SIDED LOW BACK PAIN WITHOUT SCIATICA: Primary | ICD-10-CM

## 2024-01-30 DIAGNOSIS — G89.29 CHRONIC LEFT-SIDED LOW BACK PAIN WITHOUT SCIATICA: Primary | ICD-10-CM

## 2024-01-30 PROCEDURE — 97112 NEUROMUSCULAR REEDUCATION: CPT | Mod: GP

## 2024-01-30 PROCEDURE — 97530 THERAPEUTIC ACTIVITIES: CPT | Mod: GP

## 2024-02-01 ENCOUNTER — LAB (OUTPATIENT)
Dept: LAB | Facility: CLINIC | Age: 64
End: 2024-02-01
Payer: COMMERCIAL

## 2024-02-01 DIAGNOSIS — K50.819 CROHN'S DISEASE OF SMALL AND LARGE INTESTINES WITH COMPLICATION (H): ICD-10-CM

## 2024-02-01 DIAGNOSIS — Z13.220 ENCOUNTER FOR SCREENING FOR LIPID DISORDER: ICD-10-CM

## 2024-02-01 DIAGNOSIS — G89.29 CHRONIC LEFT-SIDED LOW BACK PAIN WITHOUT SCIATICA: ICD-10-CM

## 2024-02-01 DIAGNOSIS — M85.80 AGE-RELATED BONE LOSS: ICD-10-CM

## 2024-02-01 DIAGNOSIS — Z00.00 ROUTINE GENERAL MEDICAL EXAMINATION AT A HEALTH CARE FACILITY: ICD-10-CM

## 2024-02-01 DIAGNOSIS — M54.50 CHRONIC LEFT-SIDED LOW BACK PAIN WITHOUT SCIATICA: ICD-10-CM

## 2024-02-01 LAB
ALBUMIN SERPL BCG-MCNC: 4.5 G/DL (ref 3.5–5.2)
ALP SERPL-CCNC: 87 U/L (ref 40–150)
ALT SERPL W P-5'-P-CCNC: 11 U/L (ref 0–50)
ANION GAP SERPL CALCULATED.3IONS-SCNC: 10 MMOL/L (ref 7–15)
AST SERPL W P-5'-P-CCNC: 26 U/L (ref 0–45)
BILIRUB SERPL-MCNC: 0.5 MG/DL
BUN SERPL-MCNC: 10.9 MG/DL (ref 8–23)
CALCIUM SERPL-MCNC: 10 MG/DL (ref 8.8–10.2)
CHLORIDE SERPL-SCNC: 103 MMOL/L (ref 98–107)
CHOLEST SERPL-MCNC: 176 MG/DL
CREAT SERPL-MCNC: 0.69 MG/DL (ref 0.51–0.95)
DEPRECATED HCO3 PLAS-SCNC: 28 MMOL/L (ref 22–29)
EGFRCR SERPLBLD CKD-EPI 2021: >90 ML/MIN/1.73M2
ERYTHROCYTE [DISTWIDTH] IN BLOOD BY AUTOMATED COUNT: 12.3 % (ref 10–15)
FASTING STATUS PATIENT QL REPORTED: YES
GLUCOSE SERPL-MCNC: 81 MG/DL (ref 70–99)
HCT VFR BLD AUTO: 41.7 % (ref 35–47)
HDLC SERPL-MCNC: 57 MG/DL
HGB BLD-MCNC: 13.4 G/DL (ref 11.7–15.7)
LDLC SERPL CALC-MCNC: 108 MG/DL
MCH RBC QN AUTO: 30.7 PG (ref 26.5–33)
MCHC RBC AUTO-ENTMCNC: 32.1 G/DL (ref 31.5–36.5)
MCV RBC AUTO: 95 FL (ref 78–100)
NONHDLC SERPL-MCNC: 119 MG/DL
PLATELET # BLD AUTO: 259 10E3/UL (ref 150–450)
POTASSIUM SERPL-SCNC: 4 MMOL/L (ref 3.4–5.3)
PROT SERPL-MCNC: 7 G/DL (ref 6.4–8.3)
RBC # BLD AUTO: 4.37 10E6/UL (ref 3.8–5.2)
SODIUM SERPL-SCNC: 141 MMOL/L (ref 135–145)
TRIGL SERPL-MCNC: 54 MG/DL
VIT D+METAB SERPL-MCNC: 36 NG/ML (ref 20–50)
WBC # BLD AUTO: 5.5 10E3/UL (ref 4–11)

## 2024-02-01 PROCEDURE — 36415 COLL VENOUS BLD VENIPUNCTURE: CPT

## 2024-02-01 PROCEDURE — 80053 COMPREHEN METABOLIC PANEL: CPT

## 2024-02-01 PROCEDURE — 85027 COMPLETE CBC AUTOMATED: CPT

## 2024-02-01 PROCEDURE — 82306 VITAMIN D 25 HYDROXY: CPT

## 2024-02-01 PROCEDURE — 80061 LIPID PANEL: CPT

## 2024-02-12 ENCOUNTER — THERAPY VISIT (OUTPATIENT)
Dept: PHYSICAL THERAPY | Facility: CLINIC | Age: 64
End: 2024-02-12
Payer: COMMERCIAL

## 2024-02-12 DIAGNOSIS — G89.29 CHRONIC LEFT-SIDED LOW BACK PAIN WITHOUT SCIATICA: Primary | ICD-10-CM

## 2024-02-12 DIAGNOSIS — M54.50 CHRONIC LEFT-SIDED LOW BACK PAIN WITHOUT SCIATICA: Primary | ICD-10-CM

## 2024-02-12 PROCEDURE — 97112 NEUROMUSCULAR REEDUCATION: CPT | Mod: GP

## 2024-02-12 PROCEDURE — 97110 THERAPEUTIC EXERCISES: CPT | Mod: GP

## 2024-02-19 ENCOUNTER — THERAPY VISIT (OUTPATIENT)
Dept: PHYSICAL THERAPY | Facility: CLINIC | Age: 64
End: 2024-02-19
Payer: COMMERCIAL

## 2024-02-19 DIAGNOSIS — M54.50 CHRONIC LEFT-SIDED LOW BACK PAIN WITHOUT SCIATICA: Primary | ICD-10-CM

## 2024-02-19 DIAGNOSIS — G89.29 CHRONIC LEFT-SIDED LOW BACK PAIN WITHOUT SCIATICA: Primary | ICD-10-CM

## 2024-02-19 PROCEDURE — 97110 THERAPEUTIC EXERCISES: CPT | Mod: GP

## 2024-03-15 NOTE — TELEPHONE ENCOUNTER
Dr. Hernandez,    Patient is lost to pap tracking follow-up. Attempts to contact pt have been made per reminder process and there has been no reply and/or no appt scheduled.      Giulia Saunders RN  Pap Tracking     11/18/22 NIL Pap, + HR HPV (neg 16/18). Plan cotest in 1 year.   01/12/23 Ob/Gyn consult appt Plan per Ob/Gyn provider, reviewed guidelines, Nil pap with HPV other pos may be followed with pap/HPV repeat in one year. Colposcopy if persistently HPV pos or new abnormal pap. Would not consider patient immunosuppressed either as she is not on meds.   10/30/2023 Reminder MyChart  1/18/24 appt - pap not done. Will have done with OB/GYN  2/15/2024 Reminder call - spoke to pt  3/15/2024 Lost to follow-up for pap tracking

## 2024-04-04 NOTE — PROGRESS NOTES
DISCHARGE SUMMARY    Carina Chaney was seen 4  times for evaluation and treatment.   Please see goal flow sheet from episode noted date below and initial evaluation for further information.  Patient is discharged from therapy and therapy episode is resolved as of 04/04/24.      Linked Episodes   Type: Episode: Status: Noted: Resolved: Last update: Updated by:   PHYSICAL THERAPY L side LBP 1/22/2024 Active 1/22/2024 2/19/2024 10:28 AM Suzanne Alcantar, PT      Comments:

## 2024-05-07 ENCOUNTER — VIRTUAL VISIT (OUTPATIENT)
Dept: INTERNAL MEDICINE | Facility: CLINIC | Age: 64
End: 2024-05-07
Payer: COMMERCIAL

## 2024-05-07 DIAGNOSIS — J01.00 ACUTE NON-RECURRENT MAXILLARY SINUSITIS: Primary | ICD-10-CM

## 2024-05-07 DIAGNOSIS — J98.01 BRONCHOSPASM: ICD-10-CM

## 2024-05-07 PROCEDURE — 99213 OFFICE O/P EST LOW 20 MIN: CPT | Mod: 95 | Performed by: INTERNAL MEDICINE

## 2024-05-07 RX ORDER — BENZONATATE 100 MG/1
100 CAPSULE ORAL 3 TIMES DAILY PRN
Qty: 30 CAPSULE | Refills: 0 | Status: SHIPPED | OUTPATIENT
Start: 2024-05-07 | End: 2024-07-19

## 2024-05-07 RX ORDER — DOXYCYCLINE HYCLATE 100 MG
100 TABLET ORAL 2 TIMES DAILY
Qty: 14 TABLET | Refills: 0 | Status: SHIPPED | OUTPATIENT
Start: 2024-05-07 | End: 2024-05-14

## 2024-05-07 RX ORDER — ALBUTEROL SULFATE 90 UG/1
2 AEROSOL, METERED RESPIRATORY (INHALATION) EVERY 6 HOURS PRN
Qty: 18 G | Refills: 1 | Status: SHIPPED | OUTPATIENT
Start: 2024-05-07 | End: 2024-07-19

## 2024-05-07 NOTE — PROGRESS NOTES
Carina is a 64 year old who is being evaluated via a billable video visit.    How would you like to obtain your AVS? MyChart  If the video visit is dropped, the invitation should be resent by: Text to cell phone: [phone]  Will anyone else be joining your video visit? No      Assessment & Plan     Acute non-recurrent maxillary sinusitis  Bronchospasm  Doxycycline prescribed  Tessalon Perles and Albuterol inhaler refilled                      Subjective   Carina is a 64 year old, presenting for the following health issues:  Sinus Problem  Pt has Crohn's   HPI       Acute Illness  Acute illness concerns: Sinus Congestion and some SOB using inhaler  Onset/Duration: 4 weeks, started dry and then 2 weeks turned into productive   Symptoms:  Fever: No  Chills/Sweats: YES- sweats   Headache (location?): No  Sinus Pressure: YES  Conjunctivitis:  YES  Ear Pain: no  Rhinorrhea: No  Congestion: YES  Sore Throat: No  Cough: YES-productive of yellow sputum  Wheeze: YES  Decreased Appetite: YES  Nausea: YES  Vomiting: No  Diarrhea: No  Dysuria/Freq.: No  Dysuria or Hematuria: No  Fatigue/Achiness: No  Sick/Strep Exposure: No  Therapies tried and outcome: inhaler, musinex DM     Symptoms worse since 2 weeks with productive cough and sinus pressure.  Went home from work yesterday.    H/O Reactive Bronchitis in the past.        Review of Systems  Constitutional, neuro, ENT, endocrine, pulmonary, cardiac, gastrointestinal, genitourinary, musculoskeletal, integument and psychiatric systems are negative, except as otherwise noted.      Objective           Vitals:  No vitals were obtained today due to virtual visit.    Physical Exam   GENERAL: alert and no distress  EYES: Eyes grossly normal to inspection.  No discharge or erythema, or obvious scleral/conjunctival abnormalities.  RESP: No audible wheeze, cough, or visible cyanosis.    SKIN: Visible skin clear. No significant rash, abnormal pigmentation or lesions.  NEURO: Cranial  nerves grossly intact.  Mentation and speech appropriate for age.  PSYCH: Appropriate affect, tone, and pace of words          Video-Visit Details    Type of service:  Video Visit   Originating Location (pt. Location): Home    Distant Location (provider location):  On-site  Platform used for Video Visit: Annette  Signed Electronically by: Claudio Looney MD

## 2024-05-08 ENCOUNTER — MYC MEDICAL ADVICE (OUTPATIENT)
Dept: INTERNAL MEDICINE | Facility: CLINIC | Age: 64
End: 2024-05-08
Payer: COMMERCIAL

## 2024-06-01 ENCOUNTER — HEALTH MAINTENANCE LETTER (OUTPATIENT)
Age: 64
End: 2024-06-01

## 2024-07-19 ENCOUNTER — VIRTUAL VISIT (OUTPATIENT)
Dept: FAMILY MEDICINE | Facility: CLINIC | Age: 64
End: 2024-07-19
Attending: INTERNAL MEDICINE
Payer: COMMERCIAL

## 2024-07-19 DIAGNOSIS — K50.819 CROHN'S DISEASE OF SMALL AND LARGE INTESTINES WITH COMPLICATION (H): Primary | ICD-10-CM

## 2024-07-19 DIAGNOSIS — M54.50 CHRONIC LEFT-SIDED LOW BACK PAIN WITHOUT SCIATICA: ICD-10-CM

## 2024-07-19 DIAGNOSIS — G89.29 CHRONIC LEFT-SIDED LOW BACK PAIN WITHOUT SCIATICA: ICD-10-CM

## 2024-07-19 DIAGNOSIS — R87.810 CERVICAL HIGH RISK HPV (HUMAN PAPILLOMAVIRUS) TEST POSITIVE: ICD-10-CM

## 2024-07-19 PROBLEM — R11.2 NAUSEA AND VOMITING, UNSPECIFIED VOMITING TYPE: Status: RESOLVED | Noted: 2023-10-21 | Resolved: 2024-07-19

## 2024-07-19 PROBLEM — J31.0 RHINITIS: Status: RESOLVED | Noted: 2017-11-22 | Resolved: 2024-07-19

## 2024-07-19 PROCEDURE — 99213 OFFICE O/P EST LOW 20 MIN: CPT | Mod: 95 | Performed by: INTERNAL MEDICINE

## 2024-07-19 PROCEDURE — G2211 COMPLEX E/M VISIT ADD ON: HCPCS | Mod: 95 | Performed by: INTERNAL MEDICINE

## 2024-07-19 NOTE — PROGRESS NOTES
Carina is a 64 year old who is being evaluated via a billable video visit.    How would you like to obtain your AVS? MyChart  If the video visit is dropped, the invitation should be resent by: Text to cell phone: [phone]  Will anyone else be joining your video visit? No        Assessment and Plan  1. Crohn's disease of small and large intestines with complication (H)  Chronic stable, patient is following [facility] and currently not on any medications For maintenance of this.  She has plans of approaching [facility] whenever there is a flareup.  Continue current recommendations    2. Chronic left-sided low back pain without sciatica  Chronic stable condition, patient endorses that she does not have any more back pain at this time.  I do see recent physical therapy as done for the problem, patient denies any need of muscle relaxants at this time.    3. Cervical high risk HPV (human papillomavirus) test positive  Patient was actually supposed to come as in person for Pap smear but unfortunately this became a virtual visit on follow-up of her medical conditions.  Emphasized to make a Pap only visit, she is unsure if she has done with a gynecologist in the last 5 years.  Patient is going to check this and make a Pap only visit with me.      The longitudinal plan of care for the diagnosis(es)/condition(s) as documented were addressed during this visit. Due to the added complexity in care, I will continue to support Carina in the subsequent management and with ongoing continuity of care.      Please note that this note consists of symbols derived from keyboarding, dictation and/or voice recognition software. As a result, there may be errors in the script that have gone undetected. Please consider this when interpreting information found in this chart.    Patient Instructions   As discussed, please make a PAP only visit as inperson for taking care of it as long as its not done at any OBGYN which we dont have records here.  "      Return in about 4 weeks (around 8/16/2024), or if symptoms worsen or fail to improve, for If symptoms persist, Follow up of last visit PAP only visit.    Suzie Hernandez MD  [facility] SystemRENÉE Lim is a 64 year old, presenting for the following health issues:  Follow Up and Back Pain        7/19/2024     9:36 AM   Additional Questions   Roomed by Sherry     History of Present Illness       Reason for visit:  General checkup    She eats 2-3 servings of fruits and vegetables daily.She consumes 1 sweetened beverage(s) daily.She exercises with enough effort to increase her heart rate 20 to 29 minutes per day.  She exercises with enough effort to increase her heart rate 7 days per week.   She is taking medications regularly.          Allergies   Allergen Reactions    Hydromorphone Nausea and Vomiting    Morphine Nausea and Vomiting and Other (See Comments)     Tolerates oxycodone 5/2014  Severe nausea and vomiting  \"violent vomiting.\"      Ciprofloxacin Nausea and Vomiting and Other (See Comments)     \"cramps\"  Cramps, nausea, vomiting.  Can take with caution if really necessary.      Codeine     Prednisone Unknown    Sodium Phosphate Nausea and Vomiting        Past Medical History:   Diagnosis Date    Colitis     Crohn disease (H)        Past Surgical History:   Procedure Laterality Date    ENT SURGERY      GYN SURGERY         Family History   Problem Relation Age of Onset    Hypertension Father        Social History     Tobacco Use    Smoking status: Former     Types: Cigarettes, Other     Passive exposure: Never    Smokeless tobacco: Never    Tobacco comments:     Started at 17 yrs age 1 cig/day >> 3 Cig/day and stopped at 32 yrs age.    Substance Use Topics    Alcohol use: Yes     Comment: Very little, less than a glass of wine per month        No current outpatient medications on file.     No current facility-administered medications for this visit.          Review of " Systems  Constitutional, HEENT, cardiovascular, pulmonary, GI, , musculoskeletal, neuro, skin, endocrine and psych systems are negative, except as otherwise noted.      Objective           Vitals:  No vitals were obtained today due to virtual visit.    Physical Exam   GENERAL: alert and no distress  EYES: Eyes grossly normal to inspection.  No discharge or erythema, or obvious scleral/conjunctival abnormalities.  RESP: No audible wheeze, cough, or visible cyanosis.    SKIN: Visible skin clear. No significant rash, abnormal pigmentation or lesions.  NEURO: Cranial nerves grossly intact.  Mentation and speech appropriate for age.  PSYCH: Appropriate affect, tone, and pace of words      Video-Visit Details    Type of service:  Video Visit   Originating Location (pt. Location): Home    Distant Location (provider location):  On-site  Platform used for Video Visit: Annette  Signed Electronically by: Suzie Hernandez MD

## 2024-07-19 NOTE — PATIENT INSTRUCTIONS
As discussed, please make a PAP only visit as inperson for taking care of it as long as its not done at any OBGYN which we dont have records here.

## 2024-07-23 ENCOUNTER — PATIENT OUTREACH (OUTPATIENT)
Dept: CARE COORDINATION | Facility: CLINIC | Age: 64
End: 2024-07-23
Payer: COMMERCIAL

## 2024-07-23 ENCOUNTER — TELEPHONE (OUTPATIENT)
Dept: FAMILY MEDICINE | Facility: CLINIC | Age: 64
End: 2024-07-23
Payer: COMMERCIAL

## 2024-07-23 NOTE — TELEPHONE ENCOUNTER
Patient Quality Outreach    Patient is due for the following:   Breast Cancer Screening - Mammogram    Next Steps:   Schedule Mammogram    Type of outreach:    Sent [employer] message.      Questions for provider review:    None           Giulia Russ MA

## 2024-11-27 ENCOUNTER — TELEPHONE (OUTPATIENT)
Dept: FAMILY MEDICINE | Facility: CLINIC | Age: 64
End: 2024-11-27
Payer: COMMERCIAL

## 2024-11-27 NOTE — TELEPHONE ENCOUNTER
Patient Quality Outreach    Patient is due for the following:   Breast Cancer Screening - Mammogram    Action(s) Taken:   Message sent to schedule screening.    Type of outreach:    Sent [employer] message.    Questions for provider review:    None           Giulia Russ MA

## 2024-12-19 ENCOUNTER — PATIENT OUTREACH (OUTPATIENT)
Dept: CARE COORDINATION | Facility: CLINIC | Age: 64
End: 2024-12-19
Payer: COMMERCIAL

## 2025-01-02 ENCOUNTER — PATIENT OUTREACH (OUTPATIENT)
Dept: CARE COORDINATION | Facility: CLINIC | Age: 65
End: 2025-01-02
Payer: COMMERCIAL

## 2025-02-23 ENCOUNTER — HEALTH MAINTENANCE LETTER (OUTPATIENT)
Age: 65
End: 2025-02-23

## 2025-03-18 ENCOUNTER — VIRTUAL VISIT (OUTPATIENT)
Dept: FAMILY MEDICINE | Facility: CLINIC | Age: 65
End: 2025-03-18
Payer: COMMERCIAL

## 2025-03-18 DIAGNOSIS — R05.8 PRODUCTIVE COUGH: Primary | ICD-10-CM

## 2025-03-18 PROCEDURE — 98005 SYNCH AUDIO-VIDEO EST LOW 20: CPT

## 2025-03-18 RX ORDER — PREDNISONE 20 MG/1
20 TABLET ORAL DAILY
Qty: 5 TABLET | Refills: 0 | Status: SHIPPED | OUTPATIENT
Start: 2025-03-18 | End: 2025-03-23

## 2025-03-18 RX ORDER — AMOXICILLIN 500 MG/1
1000 CAPSULE ORAL 3 TIMES DAILY
Qty: 30 CAPSULE | Refills: 0 | Status: SHIPPED | OUTPATIENT
Start: 2025-03-18 | End: 2025-03-23

## 2025-03-18 RX ORDER — AZITHROMYCIN 250 MG/1
TABLET, FILM COATED ORAL
Qty: 6 TABLET | Refills: 0 | Status: SHIPPED | OUTPATIENT
Start: 2025-03-18 | End: 2025-03-23

## 2025-03-18 NOTE — PROGRESS NOTES
"Carina is a 64 year old who is being evaluated via a billable video visit.    How would you like to obtain your AVS? MyChart  If the video visit is dropped, the invitation should be resent by: Text to cell phone: [phone]  Will anyone else be joining your video visit? No      Assessment & Plan     Productive cough  Symptoms per HPI. Suspect bronchitis given symptom progression, however cannot r/o pneumonia d/t lack of physical exam and/or imaging. Patient has had similar symptoms in the past and has responded well to antibiotics so she would prefer to treat given she feels symptoms are worsening. Discussed below plan with patient and she is agreeable. She will follow-up if symptoms worsen or do not improve. Continue OTC remedies to aid in symptom relief.  - amoxicillin (AMOXIL) 500 MG capsule; Take 2 capsules (1,000 mg) by mouth 3 times daily for 5 days.  - azithromycin (ZITHROMAX) 250 MG tablet; Take 2 tablets (500 mg) by mouth daily for 1 day, THEN 1 tablet (250 mg) daily for 4 days.  - predniSONE (DELTASONE) 20 MG tablet; Take 1 tablet (20 mg) by mouth daily for 5 days.            FUTURE APPOINTMENTS:       - Follow-up for annual visit or as needed    Subjective   Carina is a 64 year old, presenting for the following health issues:  URI (Patient is having a virtual visit with complaints of a cold due to environmental conditions and  not contigen. )    Here for productive cough/cold that started on Saturday in addition to body aches and chills that have caused her to change clothes d/t sweating.  On Sunday she states her intercostal muscles were sore from coughing and she continued to have chills/body aches. Has not checked temperature.  On Monday, she started using a nebulizer and noticed some wheezing in her trachea area at night  Never any sore throat. No nasal congestion.   Mucous has been becoming thicker and she feels more short of breath.  No hx of asthma but describes having \"asthmatic reactions\" when " "she has \"bronchial issues\"  Reports not tolerating fans in the past and had slept with one. Thinks this may have caused an environmental sort of reaction to her symptoms, however she found out that she may have been exposed to someone with similar sick symptoms recently  She does have a hx of bronchitis vs pneumonia via chart review that has caused her to go to the ER  No blood in sputum, no GI issues but does have poor appetite  Has not done well with high dose steroids but has been fine with lower doses    History of Present Illness      She is taking medications regularly.                Review of Systems  Constitutional, HEENT, cardiovascular, pulmonary, gi and gu systems are negative, except as otherwise noted.      Objective           Vitals:  No vitals were obtained today due to virtual visit.    Physical Exam   GENERAL: alert and no distress  EYES: Eyes grossly normal to inspection.  No discharge or erythema, or obvious scleral/conjunctival abnormalities.  RESP: No audible wheeze, cough, or visible cyanosis.    SKIN: Visible skin clear. No significant rash, abnormal pigmentation or lesions.  NEURO: Cranial nerves grossly intact.  Mentation and speech appropriate for age.  PSYCH: Appropriate affect, tone, and pace of words          Video-Visit Details    Type of service:  Video Visit   Originating Location (pt. Location): Home  Distant Location (provider location):  On-site  Platform used for Video Visit: Annette  Signed Electronically by: Shante Daly, DNP, APRN, CNP      22 minutes spent on the date of encounter doing chart review, history and exam, documentation, and further activities per the note.        "

## 2025-04-21 NOTE — PATIENT INSTRUCTIONS
As discussed , please do fasting labs ordered.     ==================================    Preventive Health Recommendations  Female Ages 50 - 64    Yearly exam: See your health care provider every year in order to  Review health changes.   Discuss preventive care.    Review your medicines if your doctor has prescribed any.    Get a Pap test every three years (unless you have an abnormal result and your provider advi\[id number]\ses testing more often).  If you get Pap tests with HPV test, you only need to test every 5 years, unless you have an abnormal result.   You do not need a Pap test if your uterus was removed (hysterectomy) and you have not had cancer.  You should be tested each year for STDs (sexually transmitted diseases) if you're at risk.   Have a mammogram every 1 to 2 years.  Have a colonoscopy at age 50, or have a yearly FIT test (stool test). These exams screen for colon cancer.    Have a cholesterol test every 5 years, or more often if advised.  Have a diabetes test (fasting glucose) every three years. If you are at risk for diabetes, you should have this test more often.   If you are at risk for osteoporosis (brittle bone disease), think about having a bone density scan (DEXA).    Shots: Get a flu shot each year. Get a tetanus shot every 10 years.    Nutrition:   Eat at least 5 servings of fruits and vegetables each day.  Eat whole-grain bread, whole-wheat pasta and brown rice instead of white grains and rice.  Get adequate Calcium and Vitamin D.     Lifestyle  Exercise at least 150 minutes a week (30 minutes a day, 5 days a week). This will help you control your weight and prevent disease.  Limit alcohol to one drink per day.  No smoking.   Wear sunscreen to prevent skin cancer.   See your dentist every six months for an exam and cleaning.  See your eye doctor every 1 to 2 years.    
No

## 2025-07-17 ENCOUNTER — PATIENT OUTREACH (OUTPATIENT)
Dept: CARE COORDINATION | Facility: CLINIC | Age: 65
End: 2025-07-17
Payer: COMMERCIAL